# Patient Record
Sex: FEMALE | Race: WHITE | NOT HISPANIC OR LATINO | Employment: OTHER | URBAN - METROPOLITAN AREA
[De-identification: names, ages, dates, MRNs, and addresses within clinical notes are randomized per-mention and may not be internally consistent; named-entity substitution may affect disease eponyms.]

---

## 2023-03-06 ENCOUNTER — APPOINTMENT (OUTPATIENT)
Dept: LAB | Facility: LAB | Age: 68
End: 2023-03-06
Payer: MEDICARE

## 2023-03-06 LAB
ALANINE AMINOTRANSFERASE (SGPT) (U/L) IN SER/PLAS: 17 U/L (ref 7–45)
ALBUMIN (G/DL) IN SER/PLAS: 4.5 G/DL (ref 3.4–5)
ALKALINE PHOSPHATASE (U/L) IN SER/PLAS: 94 U/L (ref 33–136)
ANION GAP IN SER/PLAS: 12 MMOL/L (ref 10–20)
ASPARTATE AMINOTRANSFERASE (SGOT) (U/L) IN SER/PLAS: 17 U/L (ref 9–39)
BILIRUBIN TOTAL (MG/DL) IN SER/PLAS: 0.5 MG/DL (ref 0–1.2)
CALCIUM (MG/DL) IN SER/PLAS: 9.4 MG/DL (ref 8.6–10.3)
CARBON DIOXIDE, TOTAL (MMOL/L) IN SER/PLAS: 30 MMOL/L (ref 21–32)
CHLORIDE (MMOL/L) IN SER/PLAS: 105 MMOL/L (ref 98–107)
CHOLESTEROL (MG/DL) IN SER/PLAS: 191 MG/DL (ref 0–199)
CHOLESTEROL IN HDL (MG/DL) IN SER/PLAS: 88.4 MG/DL
CHOLESTEROL/HDL RATIO: 2.2
COBALAMIN (VITAMIN B12) (PG/ML) IN SER/PLAS: 261 PG/ML (ref 211–911)
CREATININE (MG/DL) IN SER/PLAS: 2.92 MG/DL (ref 0.5–1.05)
ERYTHROCYTE DISTRIBUTION WIDTH (RATIO) BY AUTOMATED COUNT: 14.3 % (ref 11.5–14.5)
ERYTHROCYTE MEAN CORPUSCULAR HEMOGLOBIN CONCENTRATION (G/DL) BY AUTOMATED: 31.6 G/DL (ref 32–36)
ERYTHROCYTE MEAN CORPUSCULAR VOLUME (FL) BY AUTOMATED COUNT: 89 FL (ref 80–100)
ERYTHROCYTES (10*6/UL) IN BLOOD BY AUTOMATED COUNT: 5.3 X10E12/L (ref 4–5.2)
GFR FEMALE: 17 ML/MIN/1.73M2
GLUCOSE (MG/DL) IN SER/PLAS: 89 MG/DL (ref 74–99)
HEMATOCRIT (%) IN BLOOD BY AUTOMATED COUNT: 47.1 % (ref 36–46)
HEMOGLOBIN (G/DL) IN BLOOD: 14.9 G/DL (ref 12–16)
LDL: 84 MG/DL (ref 0–99)
LEUKOCYTES (10*3/UL) IN BLOOD BY AUTOMATED COUNT: 6.4 X10E9/L (ref 4.4–11.3)
PLATELETS (10*3/UL) IN BLOOD AUTOMATED COUNT: 224 X10E9/L (ref 150–450)
POTASSIUM (MMOL/L) IN SER/PLAS: 5.1 MMOL/L (ref 3.5–5.3)
PROTEIN TOTAL: 6.3 G/DL (ref 6.4–8.2)
SODIUM (MMOL/L) IN SER/PLAS: 142 MMOL/L (ref 136–145)
THYROTROPIN (MIU/L) IN SER/PLAS BY DETECTION LIMIT <= 0.05 MIU/L: 2.3 MIU/L (ref 0.44–3.98)
TRIGLYCERIDE (MG/DL) IN SER/PLAS: 95 MG/DL (ref 0–149)
UREA NITROGEN (MG/DL) IN SER/PLAS: 24 MG/DL (ref 6–23)
VLDL: 19 MG/DL (ref 0–40)

## 2023-03-07 ENCOUNTER — TELEPHONE (OUTPATIENT)
Dept: PRIMARY CARE | Facility: CLINIC | Age: 68
End: 2023-03-07
Payer: MEDICARE

## 2023-03-07 DIAGNOSIS — R79.9 ELEVATED BUN: ICD-10-CM

## 2023-03-07 DIAGNOSIS — R82.90 NONSPECIFIC FINDING ON EXAMINATION OF URINE: ICD-10-CM

## 2023-03-13 ENCOUNTER — LAB (OUTPATIENT)
Dept: LAB | Facility: LAB | Age: 68
End: 2023-03-13
Payer: MEDICARE

## 2023-03-13 DIAGNOSIS — R82.90 NONSPECIFIC FINDING ON EXAMINATION OF URINE: ICD-10-CM

## 2023-03-13 DIAGNOSIS — R79.9 ELEVATED BUN: ICD-10-CM

## 2023-03-13 LAB
ANION GAP IN SER/PLAS: 12 MMOL/L (ref 10–20)
CALCIUM (MG/DL) IN SER/PLAS: 9.5 MG/DL (ref 8.6–10.3)
CARBON DIOXIDE, TOTAL (MMOL/L) IN SER/PLAS: 29 MMOL/L (ref 21–32)
CHLORIDE (MMOL/L) IN SER/PLAS: 105 MMOL/L (ref 98–107)
CREATININE (MG/DL) IN SER/PLAS: 1.31 MG/DL (ref 0.5–1.05)
GFR FEMALE: 44 ML/MIN/1.73M2
GLUCOSE (MG/DL) IN SER/PLAS: 78 MG/DL (ref 74–99)
POTASSIUM (MMOL/L) IN SER/PLAS: 4.7 MMOL/L (ref 3.5–5.3)
SODIUM (MMOL/L) IN SER/PLAS: 141 MMOL/L (ref 136–145)
UREA NITROGEN (MG/DL) IN SER/PLAS: 25 MG/DL (ref 6–23)

## 2023-03-13 PROCEDURE — 80048 BASIC METABOLIC PNL TOTAL CA: CPT

## 2023-03-13 PROCEDURE — 36415 COLL VENOUS BLD VENIPUNCTURE: CPT

## 2023-03-16 ENCOUNTER — APPOINTMENT (OUTPATIENT)
Dept: LAB | Facility: LAB | Age: 68
End: 2023-03-16
Payer: MEDICARE

## 2023-03-16 ENCOUNTER — TELEPHONE (OUTPATIENT)
Dept: PRIMARY CARE | Facility: CLINIC | Age: 68
End: 2023-03-16

## 2023-03-16 DIAGNOSIS — N28.1 RENAL CYST, RIGHT: Primary | ICD-10-CM

## 2023-03-16 DIAGNOSIS — R31.9 HEMATURIA, UNSPECIFIED TYPE: ICD-10-CM

## 2023-03-16 LAB
APPEARANCE, URINE: ABNORMAL
BACTERIA, URINE: ABNORMAL /HPF
BILIRUBIN, URINE: NEGATIVE
BLOOD, URINE: NEGATIVE
COLOR, URINE: YELLOW
GLUCOSE, URINE: NEGATIVE MG/DL
KETONES, URINE: NEGATIVE MG/DL
LEUKOCYTE ESTERASE, URINE: ABNORMAL
MUCUS, URINE: ABNORMAL /LPF
NITRITE, URINE: NEGATIVE
PH, URINE: 5 (ref 5–8)
PROTEIN, URINE: NEGATIVE MG/DL
RBC, URINE: 78 /HPF (ref 0–5)
SPECIFIC GRAVITY, URINE: 1.02 (ref 1–1.03)
SQUAMOUS EPITHELIAL CELLS, URINE: 2 /HPF
UROBILINOGEN, URINE: <2 MG/DL (ref 0–1.9)
WBC CLUMPS, URINE: ABNORMAL /HPF
WBC, URINE: 30 /HPF (ref 0–5)

## 2023-03-16 NOTE — TELEPHONE ENCOUNTER
I called pt Re renal US  Showed R septated cyst rec MRI renal mass protocol  Awaiting CB from rads regarding order for MRI as there does not appear to be this type of MRI in the system    SPOKE W RADS, ORDERED MR ABD RENAL MASS PROTOCOL

## 2023-03-17 LAB
TISSUE TRANSGLUTAMINASE, IGA: <1 U/ML (ref 0–14)
URINE CULTURE: NORMAL

## 2023-03-20 NOTE — RESULT ENCOUNTER NOTE
Urine test showed a significant amount of red blood cells. Culture negative for infection. Given her kidney cyst and amount of red blood cells this warrants a urology consult  Pls RF to urology Dx microscopic hematuria

## 2023-03-20 NOTE — RESULT ENCOUNTER NOTE
Urine test showed a lot of red blood cells. This is conjunction w her kidney cyst warrants a urology consult. Urine culture neg for inf.   Pls RF to uro Dx microscopic hematuria.

## 2023-03-23 ENCOUNTER — TELEPHONE (OUTPATIENT)
Dept: PRIMARY CARE | Facility: CLINIC | Age: 68
End: 2023-03-23
Payer: MEDICARE

## 2023-03-23 NOTE — TELEPHONE ENCOUNTER
Left detailed VM  Cyst does not appear worrisome  Still needs uro f/up for microscopic hematuria.   Ok to wait to see delmi or she can fiona w a diff uro if would like to be see sooner

## 2023-04-17 ENCOUNTER — TELEPHONE (OUTPATIENT)
Dept: PRIMARY CARE | Facility: CLINIC | Age: 68
End: 2023-04-17
Payer: MEDICARE

## 2023-04-17 NOTE — TELEPHONE ENCOUNTER
Patient called sts she had been having pain for the past 3 weeks and while lifting something Saturday felt something  in her back she is in so much pain would like to see if she can be seen sts she thinks its a compression fx? Its bad sts shooting pain? Please advise

## 2023-04-17 NOTE — TELEPHONE ENCOUNTER
Patient called back and sts she did have xrays done at the  they said are asking for a follow up appointment and possible MRI is needed she would like appointment ASAP?  please advise

## 2023-04-18 NOTE — TELEPHONE ENCOUNTER
Spoke with patient she is coming in tomorrow she is very upset she needs to have an MRI ordered she needs to have it done she is in tears telling me that waiting for the MRI is unacceptable she is going on vacation and sitting in a car for hours? Should she cx her plans?  She is very frantic.

## 2023-04-19 ENCOUNTER — OFFICE VISIT (OUTPATIENT)
Dept: PRIMARY CARE | Facility: CLINIC | Age: 68
End: 2023-04-19
Payer: MEDICARE

## 2023-04-19 DIAGNOSIS — M54.9 SEVERE BACK PAIN: Primary | ICD-10-CM

## 2023-04-19 PROCEDURE — 3008F BODY MASS INDEX DOCD: CPT | Performed by: FAMILY MEDICINE

## 2023-04-19 PROCEDURE — 99215 OFFICE O/P EST HI 40 MIN: CPT | Performed by: FAMILY MEDICINE

## 2023-04-19 PROCEDURE — 1157F ADVNC CARE PLAN IN RCRD: CPT | Performed by: FAMILY MEDICINE

## 2023-04-19 PROCEDURE — 1159F MED LIST DOCD IN RCRD: CPT | Performed by: FAMILY MEDICINE

## 2023-04-19 RX ORDER — OMEPRAZOLE 40 MG/1
40 CAPSULE, DELAYED RELEASE ORAL
COMMUNITY

## 2023-04-19 RX ORDER — AMLODIPINE BESYLATE 5 MG/1
5 TABLET ORAL DAILY
COMMUNITY
End: 2023-10-09 | Stop reason: DRUGHIGH

## 2023-04-19 RX ORDER — CYCLOBENZAPRINE HCL 5 MG
5 TABLET ORAL
COMMUNITY
Start: 2023-04-17 | End: 2023-10-09 | Stop reason: ALTCHOICE

## 2023-04-19 RX ORDER — LEVOTHYROXINE SODIUM 75 UG/1
75 TABLET ORAL DAILY
COMMUNITY
End: 2024-03-07

## 2023-04-19 RX ORDER — AMOXICILLIN AND CLAVULANATE POTASSIUM 875; 125 MG/1; MG/1
1 TABLET, FILM COATED ORAL 2 TIMES DAILY
COMMUNITY
Start: 2023-04-16 | End: 2023-10-09 | Stop reason: ALTCHOICE

## 2023-04-19 RX ORDER — SUCRALFATE 1 G/1
1 TABLET ORAL
COMMUNITY
Start: 2022-07-11 | End: 2023-09-06 | Stop reason: ALTCHOICE

## 2023-04-19 RX ORDER — BLOOD-GLUCOSE METER
EACH MISCELLANEOUS 4 TIMES DAILY
COMMUNITY
Start: 2021-04-12 | End: 2023-10-09 | Stop reason: ALTCHOICE

## 2023-04-19 RX ORDER — PREDNISONE 20 MG/1
TABLET ORAL
COMMUNITY
Start: 2023-04-17 | End: 2023-08-11 | Stop reason: ALTCHOICE

## 2023-04-19 RX ORDER — ATORVASTATIN CALCIUM 10 MG/1
10 TABLET, FILM COATED ORAL DAILY
COMMUNITY
End: 2024-03-07

## 2023-04-19 RX ORDER — ONDANSETRON 4 MG/1
TABLET, FILM COATED ORAL
COMMUNITY
Start: 2020-05-08

## 2023-04-19 ASSESSMENT — ENCOUNTER SYMPTOMS
DEPRESSION: 0
OCCASIONAL FEELINGS OF UNSTEADINESS: 1
LOSS OF SENSATION IN FEET: 0

## 2023-04-19 NOTE — PROGRESS NOTES
"Subjective   Marissa Mosquera is a 68 y.o. female who presents for Back Pain (Pt here following up from urgent care on back pain ).  HPI  In severe pain after planting her foot and twisting 5 d ago. Has been in SEVERE PAIN since. Went to  2 d ago. Xrays L spine and hip w out acute changes. Unable to sit up straight, can barely walk. Wearing a back brace. +tingling down L leg.   No loss of bowel or bladder control.   Here w her , Broderick.   [place] a trip out [place] in 2 weeks and if she cant go \"SHE WILL DIE\". Has not been on a ni in over 20 yrs   Taking pred and flexeril w out improvement   Current Outpatient Medications on File Prior to Visit   Medication Sig Dispense Refill    amoxicillin-pot clavulanate (Augmentin) 875-125 mg tablet Take 1 tablet (875 mg) by mouth in the morning and 1 tablet (875 mg) before bedtime.      blood sugar diagnostic (Easy Touch Test Strip) strip 4 times a day.      cyclobenzaprine (Flexeril) 5 mg tablet Take 1 tablet (5 mg) by mouth.      ondansetron (Zofran) 4 mg tablet Take by mouth.      predniSONE (Deltasone) 20 mg tablet Take 2 tablets orally once a day (morning), for 5 days. with food.      sucralfate (Carafate) 1 gram tablet Take 1 tablet (1 g) by mouth.      amLODIPine (Norvasc) 5 mg tablet Take 1 tablet (5 mg) by mouth once daily.      atorvastatin (Lipitor) 10 mg tablet Take 1 tablet (10 mg) by mouth once daily.      levothyroxine (Synthroid, Levoxyl) 75 mcg tablet Take 1 tablet (75 mcg) by mouth once daily.      omeprazole (PriLOSEC) 40 mg DR capsule Take 1 capsule (40 mg) by mouth once daily in the morning. Take before meals.       No current facility-administered medications on file prior to visit.                  Objective   There were no vitals taken for this visit.   Physical Exam  PATIENT REFUSED VITALS   General: MODERATE DISTRESS   HEENT:NCAT, PERRLA  MSK: no c/c/e.  SEVERE L LOWER L SPINE PARASPINAL MUSCLE TENDERNESS INTO GLUTEAL REGION   WEARING LARGE BACK " BRACE   BENDING FWD   UNABLE TO SIT   Skin: warm and dry  Psych: cooperative, appropriate affect  Neuro: speech clear. A&Ox3  Assessment/Plan   Problem List Items Addressed This Visit    None  Visit Diagnoses       Severe back pain    -  Primary  -In significant pain and distress  -reviewed xrays from 2 d ago. No acute changes. L1 Comp Fx from years ago   -concern for disc herniation   -needs stat imaging which I'm unable to order stat MRI   -Advised ER. Pt and  agree to go to [facility]. Spoke w ER provider, aware of her arrival   -no improvement w pred/flexeril

## 2023-05-02 ENCOUNTER — HOSPITAL ENCOUNTER (OUTPATIENT)
Dept: DATA CONVERSION | Facility: HOSPITAL | Age: 68
End: 2023-05-02
Attending: ANESTHESIOLOGY | Admitting: ANESTHESIOLOGY
Payer: MEDICARE

## 2023-05-02 DIAGNOSIS — Z96.612 PRESENCE OF LEFT ARTIFICIAL SHOULDER JOINT: ICD-10-CM

## 2023-05-02 DIAGNOSIS — M54.17 RADICULOPATHY, LUMBOSACRAL REGION: ICD-10-CM

## 2023-05-02 DIAGNOSIS — Z96.611 PRESENCE OF RIGHT ARTIFICIAL SHOULDER JOINT: ICD-10-CM

## 2023-05-02 DIAGNOSIS — I10 ESSENTIAL (PRIMARY) HYPERTENSION: ICD-10-CM

## 2023-05-02 DIAGNOSIS — N28.1 CYST OF KIDNEY, ACQUIRED: ICD-10-CM

## 2023-05-02 DIAGNOSIS — Z96.651 PRESENCE OF RIGHT ARTIFICIAL KNEE JOINT: ICD-10-CM

## 2023-06-26 LAB
ALBUMIN (G/DL) IN SER/PLAS: 4.6 G/DL (ref 3.4–5)
ALBUMIN (MG/L) IN URINE: <7 MG/L
ALBUMIN/CREATININE (UG/MG) IN URINE: NORMAL UG/MG CRT (ref 0–30)
ANION GAP IN SER/PLAS: 20 MMOL/L (ref 10–20)
APPEARANCE, URINE: ABNORMAL
BILIRUBIN, URINE: NEGATIVE
BLOOD, URINE: NEGATIVE
CALCIUM (MG/DL) IN SER/PLAS: 10.8 MG/DL (ref 8.6–10.6)
CALCIUM OXALATE CRYSTALS, URINE: ABNORMAL /HPF
CARBON DIOXIDE, TOTAL (MMOL/L) IN SER/PLAS: 24 MMOL/L (ref 21–32)
CHLORIDE (MMOL/L) IN SER/PLAS: 105 MMOL/L (ref 98–107)
COLOR, URINE: ABNORMAL
CREATININE (MG/DL) IN SER/PLAS: 1.3 MG/DL (ref 0.5–1.05)
CREATININE (MG/DL) IN URINE: 49.3 MG/DL (ref 20–320)
CREATININE (MG/DL) IN URINE: 49.3 MG/DL (ref 20–320)
ERYTHROCYTE DISTRIBUTION WIDTH (RATIO) BY AUTOMATED COUNT: 13.8 % (ref 11.5–14.5)
ERYTHROCYTE MEAN CORPUSCULAR HEMOGLOBIN CONCENTRATION (G/DL) BY AUTOMATED: 33.1 G/DL (ref 32–36)
ERYTHROCYTE MEAN CORPUSCULAR VOLUME (FL) BY AUTOMATED COUNT: 86 FL (ref 80–100)
ERYTHROCYTES (10*6/UL) IN BLOOD BY AUTOMATED COUNT: 5.22 X10E12/L (ref 4–5.2)
GFR FEMALE: 44 ML/MIN/1.73M2
GLUCOSE (MG/DL) IN SER/PLAS: 102 MG/DL (ref 74–99)
GLUCOSE, URINE: NEGATIVE MG/DL
HEMATOCRIT (%) IN BLOOD BY AUTOMATED COUNT: 44.7 % (ref 36–46)
HEMOGLOBIN (G/DL) IN BLOOD: 14.8 G/DL (ref 12–16)
KETONES, URINE: NEGATIVE MG/DL
LEUKOCYTE ESTERASE, URINE: ABNORMAL
LEUKOCYTES (10*3/UL) IN BLOOD BY AUTOMATED COUNT: 6.7 X10E9/L (ref 4.4–11.3)
NITRITE, URINE: NEGATIVE
NRBC (PER 100 WBCS) BY AUTOMATED COUNT: 0 /100 WBC (ref 0–0)
PH, URINE: 5 (ref 5–8)
PHOSPHATE (MG/DL) IN SER/PLAS: 4.7 MG/DL (ref 2.5–4.9)
PLATELETS (10*3/UL) IN BLOOD AUTOMATED COUNT: 301 X10E9/L (ref 150–450)
POTASSIUM (MMOL/L) IN SER/PLAS: 5 MMOL/L (ref 3.5–5.3)
PROTEIN (MG/DL) IN URINE: 5 MG/DL (ref 5–24)
PROTEIN, URINE: NEGATIVE MG/DL
PROTEIN/CREATININE (MG/MG) IN URINE: 0.1 MG/MG CREAT (ref 0–0.17)
RBC, URINE: 1 /HPF (ref 0–5)
SODIUM (MMOL/L) IN SER/PLAS: 144 MMOL/L (ref 136–145)
SPECIFIC GRAVITY, URINE: 1.01 (ref 1–1.03)
UREA NITROGEN (MG/DL) IN SER/PLAS: 23 MG/DL (ref 6–23)
UROBILINOGEN, URINE: <2 MG/DL (ref 0–1.9)
WBC, URINE: <1 /HPF (ref 0–5)

## 2023-06-27 LAB
IMMUNOGLOBULIN LIGHT CHAINS KAPPA/LAMBDA (MASS RATIO) IN SERUM: 1.14 (ref 0.26–1.65)
IMMUNOGLOBULIN LIGHT CHAINS.KAPPA (MG/DL) IN SERUM: 1.78 MG/DL (ref 0.33–1.94)
IMMUNOGLOBULIN LIGHT CHAINS.LAMBDA (MG/DL) IN SERUM: 1.56 MG/DL (ref 0.57–2.63)

## 2023-08-10 ENCOUNTER — TELEPHONE (OUTPATIENT)
Dept: PRIMARY CARE | Facility: CLINIC | Age: 68
End: 2023-08-10
Payer: MEDICARE

## 2023-08-10 NOTE — TELEPHONE ENCOUNTER
Next is 8/16, or we have three EPV fast appts from 2-3 today.   Could dbl bk then, but that would be 4th one today--BB to approve?

## 2023-08-10 NOTE — TELEPHONE ENCOUNTER
Patient called in with pain near left side under ribs, very painful to touch, has upcoming appt 09/06/2023, Advise?

## 2023-08-11 ENCOUNTER — OFFICE VISIT (OUTPATIENT)
Dept: PRIMARY CARE | Facility: CLINIC | Age: 68
End: 2023-08-11
Payer: MEDICARE

## 2023-08-11 VITALS
DIASTOLIC BLOOD PRESSURE: 84 MMHG | OXYGEN SATURATION: 98 % | HEART RATE: 67 BPM | SYSTOLIC BLOOD PRESSURE: 137 MMHG | RESPIRATION RATE: 18 BRPM | TEMPERATURE: 98.4 F | WEIGHT: 193.4 LBS | BODY MASS INDEX: 36.51 KG/M2 | HEIGHT: 61 IN

## 2023-08-11 DIAGNOSIS — R07.81 RIB PAIN ON LEFT SIDE: Primary | ICD-10-CM

## 2023-08-11 DIAGNOSIS — K29.00 ACUTE SUPERFICIAL GASTRITIS WITHOUT HEMORRHAGE: ICD-10-CM

## 2023-08-11 PROCEDURE — 1125F AMNT PAIN NOTED PAIN PRSNT: CPT | Performed by: FAMILY MEDICINE

## 2023-08-11 PROCEDURE — 3008F BODY MASS INDEX DOCD: CPT | Performed by: FAMILY MEDICINE

## 2023-08-11 PROCEDURE — 1159F MED LIST DOCD IN RCRD: CPT | Performed by: FAMILY MEDICINE

## 2023-08-11 PROCEDURE — 99214 OFFICE O/P EST MOD 30 MIN: CPT | Performed by: FAMILY MEDICINE

## 2023-08-11 PROCEDURE — 1157F ADVNC CARE PLAN IN RCRD: CPT | Performed by: FAMILY MEDICINE

## 2023-08-11 PROCEDURE — 1036F TOBACCO NON-USER: CPT | Performed by: FAMILY MEDICINE

## 2023-08-11 RX ORDER — PNV NO.95/FERROUS FUM/FOLIC AC 28MG-0.8MG
1 TABLET ORAL
COMMUNITY
End: 2024-02-07 | Stop reason: ALTCHOICE

## 2023-08-11 RX ORDER — CALCIUM CARBONATE 200(500)MG
TABLET,CHEWABLE ORAL
COMMUNITY
Start: 2023-06-05

## 2023-08-11 RX ORDER — DICYCLOMINE HYDROCHLORIDE 10 MG/1
10 CAPSULE ORAL AS NEEDED
COMMUNITY
Start: 2023-06-05 | End: 2023-10-09 | Stop reason: ALTCHOICE

## 2023-08-11 RX ORDER — SUCRALFATE 1 G/1
1 TABLET ORAL
Qty: 28 TABLET | Refills: 0 | Status: SHIPPED | OUTPATIENT
Start: 2023-08-11 | End: 2023-08-18

## 2023-08-11 NOTE — PROGRESS NOTES
"Subjective   Marissa Mosquera is a 68 y.o. female who presents for Chest Pain (painful ribs).  HPI  L lower rib pain worse w touch, pain to sleep. Bras hurt. No rash, no fever, no injury. Started 3 mo ago. Started dull ache and now start. Worsening fatigue.     Ongoing gastropareis. Not on meds d/t CKD     Lumbar radicular pain somewhat better.   Current Outpatient Medications on File Prior to Visit   Medication Sig Dispense Refill    calcium carbonate (Tums) 200 mg calcium chewable tablet Chew.      dicyclomine (Bentyl) 10 mg capsule Take 1 capsule (10 mg) by mouth if needed.      amLODIPine (Norvasc) 5 mg tablet Take 1 tablet (5 mg) by mouth once daily.      amoxicillin-pot clavulanate (Augmentin) 875-125 mg tablet Take 1 tablet (875 mg) by mouth in the morning and 1 tablet (875 mg) before bedtime.      atorvastatin (Lipitor) 10 mg tablet Take 1 tablet (10 mg) by mouth once daily.      blood sugar diagnostic (Easy Touch Test Strip) strip 4 times a day.      calcium carbonate-vitamin D3 (Oscal-500) 500 mg-10 mcg (400 unit) tablet Take 1 tablet by mouth once daily.      cyclobenzaprine (Flexeril) 5 mg tablet Take 1 tablet (5 mg) by mouth.      levothyroxine (Synthroid, Levoxyl) 75 mcg tablet Take 1 tablet (75 mcg) by mouth once daily.      omeprazole (PriLOSEC) 40 mg DR capsule Take 1 capsule (40 mg) by mouth once daily in the morning. Take before meals.      ondansetron (Zofran) 4 mg tablet Take by mouth.      sucralfate (Carafate) 1 gram tablet Take 1 tablet (1 g) by mouth.      [DISCONTINUED] predniSONE (Deltasone) 20 mg tablet Take 2 tablets orally once a day (morning), for 5 days. with food.       No current facility-administered medications on file prior to visit.                  Objective   /84   Pulse 67   Temp 36.9 °C (98.4 °F)   Resp 18   Ht 1.549 m (5' 0.98\")   Wt 87.7 kg (193 lb 6.4 oz)   SpO2 98%   BMI 36.56 kg/m²    Physical Exam  General: NAD  HEENT:NCAT, PERRLA, nml OP  Neck: no " cervical KALPANA  Heart: RRR no murmur, no edema   Lungs: CTA b/l, no wheeze or rhonchi   GI: abd soft, LUQ pain, nondistended.   MSK: no c/c/e. nml gait L LOWER LAT RIB PAIN   Skin: warm and dry  Psych: cooperative, appropriate affect  Neuro: speech clear. A&Ox3  Assessment/Plan   Problem List Items Addressed This Visit    None  Visit Diagnoses       Rib pain on left side    -  Primary  -Check rib series but could be GI RF pain     Relevant Orders    XR ribs left 2 views    Acute superficial gastritis without hemorrhage      -known Hx of ulcers not on PPI d/t CKD   -carfate 1 g QID x 7 d   -gastroparesis also playing a role, not on Tx d/t CKD     Relevant Medications    sucralfate (Carafate) 1 gram tablet

## 2023-08-31 PROBLEM — K58.0 IRRITABLE BOWEL SYNDROME WITH DIARRHEA: Status: ACTIVE | Noted: 2023-08-31

## 2023-08-31 PROBLEM — R73.03 PRE-DIABETES: Status: ACTIVE | Noted: 2023-08-31

## 2023-08-31 PROBLEM — E66.812 CLASS 2 DRUG-INDUCED OBESITY WITH BODY MASS INDEX (BMI) OF 35.0 TO 35.9 IN ADULT: Status: ACTIVE | Noted: 2023-08-31

## 2023-08-31 PROBLEM — R06.02 SHORTNESS OF BREATH AT REST: Status: ACTIVE | Noted: 2023-08-31

## 2023-08-31 PROBLEM — H53.9 VISION CHANGES: Status: ACTIVE | Noted: 2023-08-31

## 2023-08-31 PROBLEM — Z96.612 HISTORY OF REPLACEMENT OF BOTH SHOULDER JOINTS: Status: ACTIVE | Noted: 2023-08-31

## 2023-08-31 PROBLEM — N18.30 STAGE 3 CHRONIC KIDNEY DISEASE (MULTI): Status: ACTIVE | Noted: 2023-08-31

## 2023-08-31 PROBLEM — E66.3 OVERWEIGHT: Status: ACTIVE | Noted: 2023-08-31

## 2023-08-31 PROBLEM — T50.4X5A: Status: ACTIVE | Noted: 2023-08-31

## 2023-08-31 PROBLEM — E78.5 DYSLIPIDEMIA: Status: ACTIVE | Noted: 2023-08-31

## 2023-08-31 PROBLEM — R53.83 FATIGUE: Status: ACTIVE | Noted: 2023-08-31

## 2023-08-31 PROBLEM — M54.16 LUMBAR RADICULITIS: Status: ACTIVE | Noted: 2023-08-31

## 2023-08-31 PROBLEM — S32.010A COMPRESSION FRACTURE OF L1 LUMBAR VERTEBRA (MULTI): Status: ACTIVE | Noted: 2023-08-31

## 2023-08-31 PROBLEM — Z98.84 HISTORY OF BARIATRIC SURGERY: Status: ACTIVE | Noted: 2023-08-31

## 2023-08-31 PROBLEM — N28.9 MILD RENAL INSUFFICIENCY: Status: ACTIVE | Noted: 2023-08-31

## 2023-08-31 PROBLEM — R60.0 PERIORBITAL EDEMA OF RIGHT EYE: Status: ACTIVE | Noted: 2023-08-31

## 2023-08-31 PROBLEM — R90.89 ABNORMAL BRAIN MRI: Status: ACTIVE | Noted: 2023-08-31

## 2023-08-31 PROBLEM — M51.27 LUMBOSACRAL DISC HERNIATION: Status: ACTIVE | Noted: 2023-08-31

## 2023-08-31 PROBLEM — F32.A DEPRESSIVE DISORDER: Status: ACTIVE | Noted: 2023-08-31

## 2023-08-31 PROBLEM — G25.0 ESSENTIAL TREMOR: Status: ACTIVE | Noted: 2023-08-31

## 2023-08-31 PROBLEM — M71.349 OTHER BURSAL CYST, UNSPECIFIED HAND: Status: ACTIVE | Noted: 2017-05-10

## 2023-08-31 PROBLEM — E66.01 MORBID OBESITY (MULTI): Status: ACTIVE | Noted: 2023-08-31

## 2023-08-31 PROBLEM — R63.4 WEIGHT LOSS: Status: ACTIVE | Noted: 2023-08-31

## 2023-08-31 PROBLEM — H52.10 MYOPIA WITH PRESBYOPIA: Status: ACTIVE | Noted: 2023-08-31

## 2023-08-31 PROBLEM — H35.369 MACULAR DRUSEN: Status: ACTIVE | Noted: 2023-08-31

## 2023-08-31 PROBLEM — H35.62 RETINAL HEMORRHAGE, LEFT EYE: Status: ACTIVE | Noted: 2023-08-31

## 2023-08-31 PROBLEM — M19.079 ARTHRITIS OF FIRST MTP JOINT: Status: ACTIVE | Noted: 2023-08-31

## 2023-08-31 PROBLEM — H25.813 COMBINED FORM OF AGE-RELATED CATARACT, BOTH EYES: Status: ACTIVE | Noted: 2023-08-31

## 2023-08-31 PROBLEM — M54.12 CERVICAL MYELOPATHY WITH CERVICAL RADICULOPATHY (MULTI): Status: ACTIVE | Noted: 2023-08-31

## 2023-08-31 PROBLEM — M76.61 ACHILLES TENDINITIS OF RIGHT LOWER EXTREMITY: Status: ACTIVE | Noted: 2023-08-31

## 2023-08-31 PROBLEM — G89.29 CHRONIC LEFT SHOULDER PAIN: Status: ACTIVE | Noted: 2023-08-31

## 2023-08-31 PROBLEM — M48.02 CERVICAL SPINAL STENOSIS: Status: ACTIVE | Noted: 2023-08-31

## 2023-08-31 PROBLEM — Z96.611 HISTORY OF REPLACEMENT OF BOTH SHOULDER JOINTS: Status: ACTIVE | Noted: 2023-08-31

## 2023-08-31 PROBLEM — R82.90 ABNORMAL FINDING IN URINE: Status: ACTIVE | Noted: 2023-08-31

## 2023-08-31 PROBLEM — E16.2 HYPOGLYCEMIA: Status: ACTIVE | Noted: 2023-08-31

## 2023-08-31 PROBLEM — H81.10 BENIGN POSITIONAL VERTIGO: Status: ACTIVE | Noted: 2023-08-31

## 2023-08-31 PROBLEM — E66.1 CLASS 2 DRUG-INDUCED OBESITY WITH BODY MASS INDEX (BMI) OF 35.0 TO 35.9 IN ADULT: Status: ACTIVE | Noted: 2023-08-31

## 2023-08-31 PROBLEM — T30.0 BURN, FIRST DEGREE: Status: ACTIVE | Noted: 2023-08-31

## 2023-08-31 PROBLEM — M19.90 ARTHRITIS: Status: ACTIVE | Noted: 2023-08-31

## 2023-08-31 PROBLEM — H53.9 VISUAL DISTURBANCE: Status: ACTIVE | Noted: 2023-08-31

## 2023-08-31 PROBLEM — R00.2 PALPITATIONS: Status: ACTIVE | Noted: 2023-08-31

## 2023-08-31 PROBLEM — Z86.69 H/O DIPLOPIA: Status: ACTIVE | Noted: 2023-08-31

## 2023-08-31 PROBLEM — E55.9 VITAMIN D INSUFFICIENCY: Status: ACTIVE | Noted: 2023-08-31

## 2023-08-31 PROBLEM — K29.70 GASTRITIS: Status: ACTIVE | Noted: 2023-08-31

## 2023-08-31 PROBLEM — M25.512 CHRONIC PAIN OF BOTH SHOULDERS: Status: ACTIVE | Noted: 2023-08-31

## 2023-08-31 PROBLEM — G93.0 INTRACRANIAL ARACHNOID CYST: Status: ACTIVE | Noted: 2023-08-31

## 2023-08-31 PROBLEM — N18.4 CKD (CHRONIC KIDNEY DISEASE) STAGE 4, GFR 15-29 ML/MIN (MULTI): Status: ACTIVE | Noted: 2023-08-31

## 2023-08-31 PROBLEM — G89.29 CHRONIC PAIN: Status: ACTIVE | Noted: 2023-08-31

## 2023-08-31 PROBLEM — M10.9 GOUT: Status: ACTIVE | Noted: 2023-08-31

## 2023-08-31 PROBLEM — G62.9 PERIPHERAL NEUROPATHY: Status: ACTIVE | Noted: 2023-08-31

## 2023-08-31 PROBLEM — E78.5 HLD (HYPERLIPIDEMIA): Status: ACTIVE | Noted: 2023-08-31

## 2023-08-31 PROBLEM — M85.80 OSTEOPENIA: Status: ACTIVE | Noted: 2023-08-31

## 2023-08-31 PROBLEM — D31.32 CHOROIDAL NEVUS OF LEFT EYE: Status: ACTIVE | Noted: 2023-08-31

## 2023-08-31 PROBLEM — G90.01 CAROTIDYNIA: Status: ACTIVE | Noted: 2023-08-31

## 2023-08-31 PROBLEM — B02.29 HZV (HERPES ZOSTER VIRUS) POST HERPETIC NEURALGIA: Status: ACTIVE | Noted: 2023-08-31

## 2023-08-31 PROBLEM — H04.123 BILATERAL DRY EYES: Status: ACTIVE | Noted: 2023-08-31

## 2023-08-31 PROBLEM — M25.512 CHRONIC LEFT SHOULDER PAIN: Status: ACTIVE | Noted: 2023-08-31

## 2023-08-31 PROBLEM — E83.52 HYPERCALCEMIA: Status: ACTIVE | Noted: 2023-08-31

## 2023-08-31 PROBLEM — R06.09 DOE (DYSPNEA ON EXERTION): Status: ACTIVE | Noted: 2023-08-31

## 2023-08-31 PROBLEM — M79.10 MYALGIA: Status: ACTIVE | Noted: 2023-08-31

## 2023-08-31 PROBLEM — M25.511 CHRONIC PAIN OF BOTH SHOULDERS: Status: ACTIVE | Noted: 2023-08-31

## 2023-08-31 PROBLEM — G89.29 CHRONIC PAIN OF BOTH SHOULDERS: Status: ACTIVE | Noted: 2023-08-31

## 2023-08-31 PROBLEM — R25.1 TREMOR OF RIGHT HAND: Status: ACTIVE | Noted: 2023-08-31

## 2023-08-31 PROBLEM — D31.41 NEVUS OF IRIS OF RIGHT EYE: Status: ACTIVE | Noted: 2023-08-31

## 2023-08-31 PROBLEM — H52.4 MYOPIA WITH PRESBYOPIA: Status: ACTIVE | Noted: 2023-08-31

## 2023-08-31 PROBLEM — K59.9 FUNCTIONAL BOWEL DISEASE: Status: ACTIVE | Noted: 2023-08-31

## 2023-08-31 PROBLEM — K44.9 HERNIA, HIATAL: Status: ACTIVE | Noted: 2023-08-31

## 2023-08-31 PROBLEM — K31.89 NONSURGICAL DUMPING SYNDROME: Status: ACTIVE | Noted: 2023-08-31

## 2023-08-31 PROBLEM — M81.0 OSTEOPOROSIS: Status: ACTIVE | Noted: 2023-08-31

## 2023-08-31 PROBLEM — M19.019 SHOULDER ARTHRITIS: Status: ACTIVE | Noted: 2023-08-31

## 2023-08-31 PROBLEM — M46.1 SACROILIITIS (CMS-HCC): Status: ACTIVE | Noted: 2023-08-31

## 2023-08-31 PROBLEM — I10 BENIGN ESSENTIAL HYPERTENSION: Status: ACTIVE | Noted: 2023-08-31

## 2023-08-31 PROBLEM — T23.201A SECOND DEGREE BURN OF RIGHT HAND: Status: ACTIVE | Noted: 2023-08-31

## 2023-08-31 PROBLEM — R13.19 ESOPHAGEAL DYSPHAGIA: Status: ACTIVE | Noted: 2023-08-31

## 2023-08-31 PROBLEM — K90.9 MALABSORPTION (HHS-HCC): Status: ACTIVE | Noted: 2023-08-31

## 2023-08-31 PROBLEM — F60.3 BORDERLINE PERSONALITY DISORDER (MULTI): Status: ACTIVE | Noted: 2023-08-31

## 2023-08-31 PROBLEM — K90.9 ABNORMAL INTESTINAL ABSORPTION (HHS-HCC): Status: ACTIVE | Noted: 2023-08-31

## 2023-08-31 PROBLEM — R42 ORTHOSTATIC LIGHTHEADEDNESS: Status: ACTIVE | Noted: 2023-08-31

## 2023-08-31 PROBLEM — R55 SYNCOPE: Status: ACTIVE | Noted: 2023-08-31

## 2023-08-31 PROBLEM — E53.8 VITAMIN B12 DEFICIENCY: Status: ACTIVE | Noted: 2023-08-31

## 2023-08-31 PROBLEM — R42 DIZZINESS: Status: ACTIVE | Noted: 2023-08-31

## 2023-08-31 PROBLEM — G95.9 CERVICAL MYELOPATHY WITH CERVICAL RADICULOPATHY (MULTI): Status: ACTIVE | Noted: 2023-08-31

## 2023-08-31 PROBLEM — K31.84 GASTROPARESIS: Status: ACTIVE | Noted: 2023-08-31

## 2023-08-31 PROBLEM — I10 HTN (HYPERTENSION): Status: ACTIVE | Noted: 2023-08-31

## 2023-09-06 ENCOUNTER — OFFICE VISIT (OUTPATIENT)
Dept: PRIMARY CARE | Facility: CLINIC | Age: 68
End: 2023-09-06
Payer: MEDICARE

## 2023-09-06 VITALS
SYSTOLIC BLOOD PRESSURE: 146 MMHG | TEMPERATURE: 97.8 F | HEIGHT: 61 IN | DIASTOLIC BLOOD PRESSURE: 91 MMHG | HEART RATE: 75 BPM | RESPIRATION RATE: 18 BRPM | OXYGEN SATURATION: 98 % | BODY MASS INDEX: 36.63 KG/M2 | WEIGHT: 194 LBS

## 2023-09-06 DIAGNOSIS — E78.2 MIXED HYPERLIPIDEMIA: ICD-10-CM

## 2023-09-06 DIAGNOSIS — K52.9 CHRONIC DIARRHEA: ICD-10-CM

## 2023-09-06 DIAGNOSIS — Z98.84 HISTORY OF GASTRIC BYPASS: ICD-10-CM

## 2023-09-06 DIAGNOSIS — R73.9 HYPERGLYCEMIA: ICD-10-CM

## 2023-09-06 DIAGNOSIS — E03.8 OTHER SPECIFIED HYPOTHYROIDISM: ICD-10-CM

## 2023-09-06 DIAGNOSIS — K31.84 GASTROPARESIS: Primary | ICD-10-CM

## 2023-09-06 DIAGNOSIS — R19.7 DIARRHEA IN ADULT PATIENT: ICD-10-CM

## 2023-09-06 DIAGNOSIS — I10 HYPERTENSION, UNSPECIFIED TYPE: ICD-10-CM

## 2023-09-06 DIAGNOSIS — K31.84 NONDIABETIC GASTROPARESIS: ICD-10-CM

## 2023-09-06 PROCEDURE — 3080F DIAST BP >= 90 MM HG: CPT | Performed by: FAMILY MEDICINE

## 2023-09-06 PROCEDURE — 1159F MED LIST DOCD IN RCRD: CPT | Performed by: FAMILY MEDICINE

## 2023-09-06 PROCEDURE — 1157F ADVNC CARE PLAN IN RCRD: CPT | Performed by: FAMILY MEDICINE

## 2023-09-06 PROCEDURE — 3008F BODY MASS INDEX DOCD: CPT | Performed by: FAMILY MEDICINE

## 2023-09-06 PROCEDURE — 3077F SYST BP >= 140 MM HG: CPT | Performed by: FAMILY MEDICINE

## 2023-09-06 PROCEDURE — 1125F AMNT PAIN NOTED PAIN PRSNT: CPT | Performed by: FAMILY MEDICINE

## 2023-09-06 PROCEDURE — 90677 PCV20 VACCINE IM: CPT | Performed by: FAMILY MEDICINE

## 2023-09-06 PROCEDURE — 1036F TOBACCO NON-USER: CPT | Performed by: FAMILY MEDICINE

## 2023-09-06 PROCEDURE — 99214 OFFICE O/P EST MOD 30 MIN: CPT | Performed by: FAMILY MEDICINE

## 2023-09-06 PROCEDURE — G0009 ADMIN PNEUMOCOCCAL VACCINE: HCPCS | Performed by: FAMILY MEDICINE

## 2023-09-06 RX ORDER — METOCLOPRAMIDE 5 MG/1
5 TABLET ORAL 2 TIMES DAILY
Qty: 60 TABLET | Refills: 11 | Status: SHIPPED | OUTPATIENT
Start: 2023-09-06 | End: 2023-10-09 | Stop reason: ALTCHOICE

## 2023-09-06 NOTE — PROGRESS NOTES
"Subjective   Bunny or Ms Eveline Mosquera is a 68 y.o. female who presents for Follow-up (Six month follow up ) and Immunizations (Pneu vax/).  HPI  Having diarrhea for >3 d and GI upset, having SIBO flare, taking augementin. Would like RF to [place]. Will have 12 hrs of explosive diarrhea that's on and off occurs usually weekly.     Carafate helped w gastritis last mo    Vomiting at night and aspirating.     HA w ears ringing and fatigue then will fall asleep. Unilateral HA. No vomiting. No fever, no vision, ST congestion. Started 2 d ago.   Current Outpatient Medications on File Prior to Visit   Medication Sig Dispense Refill    amLODIPine (Norvasc) 5 mg tablet Take 1 tablet (5 mg) by mouth once daily.      amoxicillin-pot clavulanate (Augmentin) 875-125 mg tablet Take 1 tablet (875 mg) by mouth in the morning and 1 tablet (875 mg) before bedtime.      atorvastatin (Lipitor) 10 mg tablet Take 1 tablet (10 mg) by mouth once daily.      blood sugar diagnostic (Easy Touch Test Strip) strip 4 times a day.      calcium carbonate (Tums) 200 mg calcium chewable tablet Chew.      calcium carbonate-vitamin D3 (Oscal-500) 500 mg-10 mcg (400 unit) tablet Take 1 tablet by mouth once daily.      cyclobenzaprine (Flexeril) 5 mg tablet Take 1 tablet (5 mg) by mouth.      dicyclomine (Bentyl) 10 mg capsule Take 1 capsule (10 mg) by mouth if needed.      levothyroxine (Synthroid, Levoxyl) 75 mcg tablet Take 1 tablet (75 mcg) by mouth once daily.      omeprazole (PriLOSEC) 40 mg DR capsule Take 1 capsule (40 mg) by mouth once daily in the morning. Take before meals.      ondansetron (Zofran) 4 mg tablet Take by mouth.      sucralfate (Carafate) 1 gram tablet Take 1 tablet (1 g) by mouth.       No current facility-administered medications on file prior to visit.                  Objective   BP (!) 146/91   Pulse 75   Temp 36.6 °C (97.8 °F)   Resp 18   Ht 1.549 m (5' 0.98\")   Wt 88 kg (194 lb)   SpO2 98%   BMI 36.68 kg/m²  "   Physical Exam  General: NAD  HEENT:NCAT, PERRLA, nml OP  Neck: no cervical KALPANA  Heart: RRR no murmur, no edema   Lungs: CTA b/l, no wheeze or rhonchi   GI: abd soft, nontender, nondistended.   MSK: no c/c/e. nml gait   Skin: warm and dry  Psych: cooperative, appropriate affect  Neuro: speech clear. A&Ox3  Assessment/Plan   Problem List Items Addressed This Visit       Gastroparesis - Primary  -aspirating at night is more worrisome  -start reglan (renal dosed) 5 mg BID   -f/up 1 mo  -f/up w GI as fiona   -reviewed GES from 3/2023 c/w GP     Relevant Medications    metoclopramide (Reglan) 5 mg tablet     Other Visit Diagnoses       Chronic diarrhea      -diarrhea episodes are severe.   -?microcolitis vs inf vs pancreas issue  -check stool studies  -RF c-scope  -f/up 1 GI as fiona     Relevant Orders    C. difficile, PCR    Lactoferrin, Fecal, Quantitative    Ova/Para + Giardia/Cryptosporidium Antigen    Stool Pathogen Panel, PCR    Pancreatic Elastase, Fecal    Fecal Fat, Qualitative    Calprotectin Stool    Colonoscopy Screening    Diarrhea in adult patient        Relevant Orders    Stool Pathogen Panel, PCR    BMI 36.0-36.9,adult      -wt is stable     HTN: BP elevated, monitor at home. Also seeing cards soon     F/up 1 mo AWV labs prior     PNA vacc today

## 2023-09-11 ENCOUNTER — LAB (OUTPATIENT)
Dept: LAB | Facility: LAB | Age: 68
End: 2023-09-11
Payer: MEDICARE

## 2023-09-11 DIAGNOSIS — R19.7 DIARRHEA IN ADULT PATIENT: ICD-10-CM

## 2023-09-11 DIAGNOSIS — K52.9 CHRONIC DIARRHEA: ICD-10-CM

## 2023-09-11 LAB
C. DIFFICILE TOXIN, PCR: NOT DETECTED
CAMPYLOBACTER GP: NOT DETECTED
NOROVIRUS GI/GII: NOT DETECTED
ROTAVIRUS A: NOT DETECTED
SALMONELLA SP.: NOT DETECTED
SHIGA TOXIN 1: NOT DETECTED
SHIGA TOXIN 2: NOT DETECTED
SHIGELLA SP.: NOT DETECTED
VIBRIO GRP.: NOT DETECTED
YERSINIA ENTEROCOLITICA: NOT DETECTED

## 2023-09-11 PROCEDURE — 83520 IMMUNOASSAY QUANT NOS NONAB: CPT

## 2023-09-11 PROCEDURE — 83630 LACTOFERRIN FECAL (QUAL): CPT

## 2023-09-11 PROCEDURE — 89125 SPECIMEN FAT STAIN: CPT

## 2023-09-11 PROCEDURE — 87493 C DIFF AMPLIFIED PROBE: CPT

## 2023-09-11 PROCEDURE — 83993 ASSAY FOR CALPROTECTIN FECAL: CPT

## 2023-09-11 PROCEDURE — 87506 IADNA-DNA/RNA PROBE TQ 6-11: CPT

## 2023-09-14 LAB
CALPROTECTIN, STOOL: 44 UG/G
FAT, FECAL - NEUTRAL: NORMAL
FAT, FECAL - SPLIT: NORMAL
PANCREATIC ELASTASE, FECAL: 188 UG/G

## 2023-09-15 LAB — LACTOFERRIN, FECAL, QUANT.: NEGATIVE

## 2023-09-19 ENCOUNTER — LAB (OUTPATIENT)
Dept: LAB | Facility: LAB | Age: 68
End: 2023-09-19
Payer: MEDICARE

## 2023-09-19 DIAGNOSIS — E03.8 OTHER SPECIFIED HYPOTHYROIDISM: ICD-10-CM

## 2023-09-19 DIAGNOSIS — K31.84 NONDIABETIC GASTROPARESIS: ICD-10-CM

## 2023-09-19 DIAGNOSIS — R73.9 HYPERGLYCEMIA: ICD-10-CM

## 2023-09-19 DIAGNOSIS — Z98.84 HISTORY OF GASTRIC BYPASS: ICD-10-CM

## 2023-09-19 DIAGNOSIS — E78.2 MIXED HYPERLIPIDEMIA: ICD-10-CM

## 2023-09-19 LAB
ALANINE AMINOTRANSFERASE (SGPT) (U/L) IN SER/PLAS: 17 U/L (ref 7–45)
ALBUMIN (G/DL) IN SER/PLAS: 4.2 G/DL (ref 3.4–5)
ALBUMIN (G/DL) IN SER/PLAS: 4.4 G/DL (ref 3.4–5)
ALKALINE PHOSPHATASE (U/L) IN SER/PLAS: 101 U/L (ref 33–136)
ANION GAP IN SER/PLAS: 15 MMOL/L (ref 10–20)
ANION GAP IN SER/PLAS: 16 MMOL/L (ref 10–20)
ASPARTATE AMINOTRANSFERASE (SGOT) (U/L) IN SER/PLAS: 19 U/L (ref 9–39)
BASOPHILS (10*3/UL) IN BLOOD BY AUTOMATED COUNT: 0.05 X10E9/L (ref 0–0.1)
BASOPHILS/100 LEUKOCYTES IN BLOOD BY AUTOMATED COUNT: 0.9 % (ref 0–2)
BILIRUBIN TOTAL (MG/DL) IN SER/PLAS: 0.5 MG/DL (ref 0–1.2)
CALCIDIOL (25 OH VITAMIN D3) (NG/ML) IN SER/PLAS: 30 NG/ML
CALCIUM (MG/DL) IN SER/PLAS: 10.1 MG/DL (ref 8.6–10.6)
CALCIUM (MG/DL) IN SER/PLAS: 9.9 MG/DL (ref 8.6–10.6)
CARBON DIOXIDE, TOTAL (MMOL/L) IN SER/PLAS: 27 MMOL/L (ref 21–32)
CARBON DIOXIDE, TOTAL (MMOL/L) IN SER/PLAS: 27 MMOL/L (ref 21–32)
CHLORIDE (MMOL/L) IN SER/PLAS: 107 MMOL/L (ref 98–107)
CHLORIDE (MMOL/L) IN SER/PLAS: 107 MMOL/L (ref 98–107)
CHOLESTEROL (MG/DL) IN SER/PLAS: 190 MG/DL (ref 0–199)
CHOLESTEROL IN HDL (MG/DL) IN SER/PLAS: 84.6 MG/DL
CHOLESTEROL/HDL RATIO: 2.2
COBALAMIN (VITAMIN B12) (PG/ML) IN SER/PLAS: 280 PG/ML (ref 211–911)
CREATININE (MG/DL) IN SER/PLAS: 1.27 MG/DL (ref 0.5–1.05)
CREATININE (MG/DL) IN SER/PLAS: 1.27 MG/DL (ref 0.5–1.05)
EOSINOPHILS (10*3/UL) IN BLOOD BY AUTOMATED COUNT: 0.12 X10E9/L (ref 0–0.7)
EOSINOPHILS/100 LEUKOCYTES IN BLOOD BY AUTOMATED COUNT: 2.2 % (ref 0–6)
ERYTHROCYTE DISTRIBUTION WIDTH (RATIO) BY AUTOMATED COUNT: 13.4 % (ref 11.5–14.5)
ERYTHROCYTE MEAN CORPUSCULAR HEMOGLOBIN CONCENTRATION (G/DL) BY AUTOMATED: 33.3 G/DL (ref 32–36)
ERYTHROCYTE MEAN CORPUSCULAR VOLUME (FL) BY AUTOMATED COUNT: 87 FL (ref 80–100)
ERYTHROCYTES (10*6/UL) IN BLOOD BY AUTOMATED COUNT: 4.96 X10E12/L (ref 4–5.2)
ESTIMATED AVERAGE GLUCOSE FOR HBA1C: 117 MG/DL
FERRITIN (UG/LL) IN SER/PLAS: 36 UG/L (ref 8–150)
GFR FEMALE: 46 ML/MIN/1.73M2
GFR FEMALE: 46 ML/MIN/1.73M2
GLUCOSE (MG/DL) IN SER/PLAS: 83 MG/DL (ref 74–99)
GLUCOSE (MG/DL) IN SER/PLAS: 86 MG/DL (ref 74–99)
HEMATOCRIT (%) IN BLOOD BY AUTOMATED COUNT: 43.2 % (ref 36–46)
HEMOGLOBIN (G/DL) IN BLOOD: 14.4 G/DL (ref 12–16)
HEMOGLOBIN A1C/HEMOGLOBIN TOTAL IN BLOOD: 5.7 %
IMMATURE GRANULOCYTES/100 LEUKOCYTES IN BLOOD BY AUTOMATED COUNT: 0.2 % (ref 0–0.9)
IRON (UG/DL) IN SER/PLAS: 87 UG/DL (ref 35–150)
IRON BINDING CAPACITY (UG/DL) IN SER/PLAS: 422 UG/DL (ref 240–445)
IRON SATURATION (%) IN SER/PLAS: 21 % (ref 25–45)
LDL: 91 MG/DL (ref 0–99)
LEUKOCYTES (10*3/UL) IN BLOOD BY AUTOMATED COUNT: 5.4 X10E9/L (ref 4.4–11.3)
LYMPHOCYTES (10*3/UL) IN BLOOD BY AUTOMATED COUNT: 1.61 X10E9/L (ref 1.2–4.8)
LYMPHOCYTES/100 LEUKOCYTES IN BLOOD BY AUTOMATED COUNT: 29.8 % (ref 13–44)
MONOCYTES (10*3/UL) IN BLOOD BY AUTOMATED COUNT: 0.46 X10E9/L (ref 0.1–1)
MONOCYTES/100 LEUKOCYTES IN BLOOD BY AUTOMATED COUNT: 8.5 % (ref 2–10)
NEUTROPHILS (10*3/UL) IN BLOOD BY AUTOMATED COUNT: 3.15 X10E9/L (ref 1.2–7.7)
NEUTROPHILS/100 LEUKOCYTES IN BLOOD BY AUTOMATED COUNT: 58.4 % (ref 40–80)
NRBC (PER 100 WBCS) BY AUTOMATED COUNT: 0 /100 WBC (ref 0–0)
PARATHYRIN INTACT (PG/ML) IN SER/PLAS: 57.9 PG/ML (ref 18.5–88)
PHOSPHATE (MG/DL) IN SER/PLAS: 4.2 MG/DL (ref 2.5–4.9)
PLATELETS (10*3/UL) IN BLOOD AUTOMATED COUNT: 257 X10E9/L (ref 150–450)
POTASSIUM (MMOL/L) IN SER/PLAS: 4.5 MMOL/L (ref 3.5–5.3)
POTASSIUM (MMOL/L) IN SER/PLAS: 4.8 MMOL/L (ref 3.5–5.3)
PROTEIN TOTAL: 6.5 G/DL (ref 6.4–8.2)
SODIUM (MMOL/L) IN SER/PLAS: 144 MMOL/L (ref 136–145)
SODIUM (MMOL/L) IN SER/PLAS: 145 MMOL/L (ref 136–145)
THYROTROPIN (MIU/L) IN SER/PLAS BY DETECTION LIMIT <= 0.05 MIU/L: 2.67 MIU/L (ref 0.44–3.98)
TRIGLYCERIDE (MG/DL) IN SER/PLAS: 72 MG/DL (ref 0–149)
UREA NITROGEN (MG/DL) IN SER/PLAS: 26 MG/DL (ref 6–23)
UREA NITROGEN (MG/DL) IN SER/PLAS: 28 MG/DL (ref 6–23)
VLDL: 14 MG/DL (ref 0–40)

## 2023-09-19 PROCEDURE — 80061 LIPID PANEL: CPT

## 2023-09-19 PROCEDURE — 83540 ASSAY OF IRON: CPT

## 2023-09-19 PROCEDURE — 84443 ASSAY THYROID STIM HORMONE: CPT

## 2023-09-19 PROCEDURE — 82607 VITAMIN B-12: CPT

## 2023-09-19 PROCEDURE — 80053 COMPREHEN METABOLIC PANEL: CPT

## 2023-09-19 PROCEDURE — 36415 COLL VENOUS BLD VENIPUNCTURE: CPT

## 2023-09-19 PROCEDURE — 83036 HEMOGLOBIN GLYCOSYLATED A1C: CPT

## 2023-09-19 PROCEDURE — 83550 IRON BINDING TEST: CPT

## 2023-09-19 PROCEDURE — 82728 ASSAY OF FERRITIN: CPT

## 2023-10-02 ENCOUNTER — TELEMEDICINE CLINICAL SUPPORT (OUTPATIENT)
Dept: PHARMACY | Facility: HOSPITAL | Age: 68
End: 2023-10-02
Payer: MEDICARE

## 2023-10-02 DIAGNOSIS — N18.31 STAGE 3A CHRONIC KIDNEY DISEASE (MULTI): Primary | ICD-10-CM

## 2023-10-02 NOTE — OP NOTE
Post Operative Note:     PreOp Diagnosis: Lumbosacral radiculitis   Post-Procedure Diagnosis: Lumbosacral radiculitis   Procedure: 1.  Caudal epidural steroid injection  under fluoroscopic guidance   Surgeon: Dr. Rin Resendez MD, PhD   Resident/Fellow/Other Assistant: Dr. Jose Burns M.D.   Estimated Blood Loss (mL): none   Specimen: no   Findings: None     Operative Report Dictated:  Dictation: not applicable - note contains Operative  Report   Operative Report:    68-year-old female presenting with acute onset left lower back pain with radiation into the posterior left leg.  Review of patient's MRI reveals small lateral  disc bulge at L5-S1 causing moderate stenosis of the right neural foramen.  Patient has mild neuroforaminal narrowing on the left as well.  Today the patient is scheduled for caudal epidural steroid injection.    After informed consent was obtained, the patient was brought to the operating room and placed in the prone position.  Pulse oximetry and blood pressure cuff were placed.  The back area was prepped and draped in the usual sterile fashion.  Using fluoroscopic  guidance, the skin and subcutaneous tissue overlying needle trajectory to the sacral hiatus was anesthetized with 0.5% lidocaine.  A 22-gauge Chiba needle was then introduced into the caudal epidural space.  Injection of contrast revealed appropriate  epidural spread without vascular uptake.  Subsequently, 15 mL of 0.5% lidocaine and 40 mg of methylprednisolone were injected.  The needle was removed.  The patient tolerated the procedure well and was then transferred to the recovery room in stable condition.     FOLLOW UP:  The patient will update us on their response to this procedure, and agrees to continue currently prescribed/recommended therapies        Attestation:   Note Completion:  I am a: Resident/Fellow   Attending Attestation I was present for the entire procedure         Electronic Signatures:  Jose Burns  (Fellow))  (Signed 02-May-2023 11:41)   Authored: Post Operative Note, Note Completion  Rin Resendez)  (Signed 02-May-2023 12:11)   Authored: Post Operative Note   Co-Signer: Post Operative Note, Note Completion      Last Updated: 02-May-2023 12:11 by Rin Resendez)

## 2023-10-02 NOTE — PROGRESS NOTES
Subjective   Patient ID: Harman or Ms Eveline Mosquera is a 68 y.o. female who presents for Chronic Kidney Disease (New start sglt2).    Referring Provider: Skylar RENDON  HPI: CKD stage 3. last EGFR 46 sCr 1.27  HTN: controlled  /82 at last ov  Medications  Amlodipine 10mg every day    Glucose: well controlled and monitored    HLD: controlled  LDL 91  On statin? Atorvastatin 10mg    Medications reviewed for appropriate dosing in setting of CKD  Recommended changes:  - no adjustments needed at this time    Objective     There were no vitals taken for this visit.     Labs  Lab Results   Component Value Date    BILITOT 0.5 09/19/2023    CALCIUM 9.9 09/19/2023    CO2 27 09/19/2023     09/19/2023    CREATININE 1.27 (H) 09/19/2023    GLUCOSE 83 09/19/2023    ALKPHOS 101 09/19/2023    K 4.5 09/19/2023    PROT 6.5 09/19/2023     09/19/2023    AST 19 09/19/2023    ALT 17 09/19/2023    BUN 26 (H) 09/19/2023    ANIONGAP 15 09/19/2023    MG 2.27 12/12/2018    PHOS 4.2 09/19/2023    ALBUMIN 4.2 09/19/2023    LIPASE 14 03/14/2019    GFRF 46 (A) 09/19/2023     Lab Results   Component Value Date    TRIG 72 09/19/2023    CHOL 190 09/19/2023    HDL 84.6 09/19/2023     Lab Results   Component Value Date    HGBA1C 5.7 (A) 09/19/2023       Current Outpatient Medications on File Prior to Visit   Medication Sig Dispense Refill    amLODIPine (Norvasc) 5 mg tablet Take 1 tablet (5 mg) by mouth once daily.      amoxicillin-pot clavulanate (Augmentin) 875-125 mg tablet Take 1 tablet (875 mg) by mouth in the morning and 1 tablet (875 mg) before bedtime.      atorvastatin (Lipitor) 10 mg tablet Take 1 tablet (10 mg) by mouth once daily.      blood sugar diagnostic (Easy Touch Test Strip) strip 4 times a day.      calcium carbonate (Tums) 200 mg calcium chewable tablet Chew.      calcium carbonate-vitamin D3 (Oscal-500) 500 mg-10 mcg (400 unit) tablet Take 1 tablet by mouth once daily.      cyclobenzaprine (Flexeril) 5  mg tablet Take 1 tablet (5 mg) by mouth.      dicyclomine (Bentyl) 10 mg capsule Take 1 capsule (10 mg) by mouth if needed.      levothyroxine (Synthroid, Levoxyl) 75 mcg tablet Take 1 tablet (75 mcg) by mouth once daily.      metoclopramide (Reglan) 5 mg tablet Take 1 tablet (5 mg) by mouth 2 times a day. 60 tablet 11    omeprazole (PriLOSEC) 40 mg DR capsule Take 1 capsule (40 mg) by mouth once daily in the morning. Take before meals.      ondansetron (Zofran) 4 mg tablet Take by mouth.       No current facility-administered medications on file prior to visit.        Assessment/Plan   PATIENT EDUCATION/DISCUSSION:  - Counseled patient on MOA, expectations, side effects, duration of therapy, contraindications, administration, and monitoring parameters  - Answered all patient questions and concerns  - jardiance preferred on patients insurance plan and pt would like to continue to fill at [facility]  - normal monthly copay $47  _______________________________________________________________________  PLAN  1. START Jardiance 10mg chad Paz, PharmD    Continue all meds under the continuation of care with the referring provider and clinical pharmacy team.

## 2023-10-06 NOTE — PROGRESS NOTES
GENERAL SURGERY/TRAUMA  HISTORY AND PHYSICAL/CONSULT    Date: 10/6/2023 Time: 9:29 AM    Name: Marissa Mosquera  MRN: [id number]    This is a 68 y.o. female presenting for yearly follow up . She is learning to manage the gaastroparesis.  She is having some diarrhea that she cannot relate to SIBO.  Dietician talked to Fernando dsouza to the sibo abx.  In the meantime, gastroparesis is manageable.  The diarrhea hard to manage.  Used probiotics and yogurt without relief.  Also since last visit has 50% renal function.  They are concerned aboput the nexium and the abx for the sibo.   She is on continuous augmentin and wants to get off of this.    She notes the diarrhea is unpredictable with a 2 day episode that can last 12 hours.     She is not sure of any precipating factors.  She is going ot a  today.     Had fried chicken, yogurt, tea.  No time to eat and felt full due to gastroparesis.  Did not want to take zofran as makes her too sleepy.   Has her gb.      Cannot digest runny yolk.  Does fine with a scrambled egg.  Trying to follow the diet. Even a liquid diet made it bad.  Has roasted carrots.  No more raw bveggiues.     GERD - Health Related Quality of Life Questionnaire (GERD- HRQL)    On PPI    How bad is the heartburn? 3 = Symptoms bothersome every day  Heartburn when lying down? 2 = Symptoms noticeable and bothersome but not every day  Heartburn when standing up? 3 = Symptoms bothersome every day  Heartburn after meals? 3 = Symptoms bothersome every day  Does heartburn change your diet? 2 = Symptoms noticeable and bothersome but not every day  Does heartburn wake you from sleep? 2 = Symptoms noticeable and bothersome but not every day  Do you have difficulty swallowing? 2 = Symptoms noticeable and bothersome but not every day  Do you have pain with swallowing? 0 = No symptoms  If you take medication, does this affect your daily life? 0 = No symptoms  How bad is the regurgitation? 0 = No symptoms  Regurgitation  when lying down? 0 = No symptoms  Regurgitation when standing up? 0 = No symptoms  Regurgitation after meals? 0 = No symptoms  Does regurgitation change your diet? 0 = No symptoms  Does regurgitation wake you from sleep? 0 = No symptoms  How satisfied are you with your present condition? Dissatisfied    Total score (calculated by summing the individual scores of questions 1-15): 17   Greatest possible score 75 (worst symptoms).   Lowest possible score 0 (no symptoms).    Heartburn score (calculated by summing the individual scores of questions 1-6): 13   Worst heartburn symptoms: 30.   No heartburn symptoms: 0.   Score less than or equal to 12 with each individual question not exceeding 2 indicate heartburn elimination.    Regurgitation score (calculated by summing the individual scores of questions 10-15): 0   Worst regurgitation symptoms: 30.   No regurgitation symptoms: 0.   Score less than or equal to 12 with each individual question not exceeding 2 indicate regurgitation elimination.        PAST MEDICAL HISTORY:  Past Medical History:   Diagnosis Date    Anesthesia of skin 04/20/2015    Finger numbness    Arthritis 2008    Cataract 2018    Chronic kidney disease 2023    Conjunctival cysts, left eye 08/14/2018    Conjunctival cyst of left eye    Disease of thyroid gland 1985    GERD (gastroesophageal reflux disease) 1976    Hypertension 2015    Hypertensive retinopathy, bilateral 08/20/2019    Hypertensive retinopathy of both eyes    Other conditions influencing health status 02/15/2017    Compression fracture    Peptic ulceration 2023    Personal history of diseases of the skin and subcutaneous tissue 11/01/2018    History of urticaria    Personal history of other diseases of the circulatory system 11/01/2018    History of hypertension    Personal history of other specified conditions 10/31/2018    History of diarrhea    Personal history of other specified conditions 07/27/2015    History of vertigo    Retinal  hemorrhage, left eye 02/13/2018    Retinal hemorrhage of left eye    Varicella 1957    Visual impairment 1963        PAST SURGICAL HISTORY:  Past Surgical History:   Procedure Laterality Date    BREAST SURGERY  2007    COLONOSCOPY  06/08/2016    Complete Colonoscopy    CT HEAD ANGIO W AND WO IV CONTRAST  06/28/2018    CT HEAD ANGIO W AND WO IV CONTRAST 6/28/2018 [facility]    CT HEAD ANGIO W AND WO IV CONTRAST  08/29/2022    CT HEAD ANGIO W AND WO IV CONTRAST 8/29/2022 [facility]    CT NECK ANGIO W AND WO IV CONTRAST  06/28/2018    CT NECK ANGIO W AND WO IV CONTRAST 6/28/2018 [facility]    CT NECK ANGIO W AND WO IV CONTRAST  08/29/2022    CT NECK ANGIO W AND WO IV CONTRAST 8/29/2022 [facility]    FRACTURE SURGERY  1980    GASTRIC FUNDOPLICATION  06/29/2017    Esophagogastric Fundoplasty Nissen Fundoplication    JOINT REPLACEMENT  2011, 2021,2022    MR HEAD ANGIO WO IV CONTRAST  09/29/2021    MR HEAD ANGIO WO IV CONTRAST 9/29/2021 [facility]    OTHER SURGICAL HISTORY  07/17/2017    Esophagogastric Fundoplasty Laparoscopic For Paraesophageal Hernia Repair    OTHER SURGICAL HISTORY  07/17/2017    Gastroplasty Longitudinal    OTHER SURGICAL HISTORY  07/17/2017    Esophageal Lengthening    OTHER SURGICAL HISTORY  07/17/2017    Repair Of Paraesophageal Hiatus Hernia    OTHER SURGICAL HISTORY  12/18/2017    Laparoscopy Repair Of Hernia    OTHER SURGICAL HISTORY  03/05/2019    Shoulder replacement    OTHER SURGICAL HISTORY  04/22/2015    Treatment Of Wrist Fracture    OTHER SURGICAL HISTORY  04/22/2015    Open Treatment Of Clavicular Fracture    OTHER SURGICAL HISTORY  04/22/2015    Breast Surgery Excision Of Breast Single Lesion    OTHER SURGICAL HISTORY  04/22/2015    Parathyroid Complete Parathyroidectomy    SPINE SURGERY  2019    TOTAL KNEE ARTHROPLASTY  04/22/2015    Knee Replacement       FAMILY HISTORY:  Family History   Problem Relation Name Age of Onset    Heart failure Mother  Elisabet     Hypertension Mother Elisabet     Arthritis Mother Elisabet     Depression Mother Elisabet     Early natural death Mother Elisabet     Stroke Mother Elisabet     Heart failure Father Vernon     Heart disease Father Vernon     Other (Sinus problem) Brother      Cancer Other Grandfather     Cancer Maternal Grandfather Jose     Early natural death Maternal Grandfather Jose     Stroke Maternal Grandfather Jose     Alcohol abuse Brother Mario     Diabetes Brother Mario     Hypertension Brother Mario     Alcohol abuse Brother Junior     Drug abuse Brother Junior     Stroke Mother's Sister Katharine         SOCIAL HISTORY:  Social History     Tobacco Use    Smoking status: Never    Smokeless tobacco: Never   Substance Use Topics    Alcohol use: Never    Drug use: Never       MEDICATIONS:  Prior to Admission Medications:    Current Outpatient Medications:     amLODIPine (Norvasc) 5 mg tablet, Take 1 tablet (5 mg) by mouth once daily., Disp: , Rfl:     amoxicillin-pot clavulanate (Augmentin) 875-125 mg tablet, Take 1 tablet (875 mg) by mouth in the morning and 1 tablet (875 mg) before bedtime., Disp: , Rfl:     atorvastatin (Lipitor) 10 mg tablet, Take 1 tablet (10 mg) by mouth once daily., Disp: , Rfl:     blood sugar diagnostic (Easy Touch Test Strip) strip, 4 times a day., Disp: , Rfl:     calcium carbonate (Tums) 200 mg calcium chewable tablet, Chew., Disp: , Rfl:     calcium carbonate-vitamin D3 (Oscal-500) 500 mg-10 mcg (400 unit) tablet, Take 1 tablet by mouth once daily., Disp: , Rfl:     cyclobenzaprine (Flexeril) 5 mg tablet, Take 1 tablet (5 mg) by mouth., Disp: , Rfl:     dicyclomine (Bentyl) 10 mg capsule, Take 1 capsule (10 mg) by mouth if needed., Disp: , Rfl:     empagliflozin (Jardiance) 10 mg, Take 1 tablet (10 mg) by mouth once daily. TAKE ONE TABLET BY MOUTH ONCE DAILY IN THE MORNING, Disp: 30 tablet, Rfl: 3    levothyroxine (Synthroid, Levoxyl) 75 mcg tablet, Take 1 tablet (75 mcg) by mouth once daily., Disp: ,  Rfl:     metoclopramide (Reglan) 5 mg tablet, Take 1 tablet (5 mg) by mouth 2 times a day., Disp: 60 tablet, Rfl: 11    omeprazole (PriLOSEC) 40 mg DR capsule, Take 1 capsule (40 mg) by mouth once daily in the morning. Take before meals., Disp: , Rfl:     ondansetron (Zofran) 4 mg tablet, Take by mouth., Disp: , Rfl:     ALLERGIES:  Allergies   Allergen Reactions    Adhesive Tape-Silicones Other     removes skin; can not use paper or silk tape    Allopurinol Unknown     flushed, hypertensive    Betamethasone Dipropionate Hallucinations     not allergic but has bad reaction    Hydrocodone Unknown    Lisinopril Diarrhea    Milk Unknown    Mold Other     sinus irritation    Oxycodone Hcl Hallucinations    Peanut Unknown    Penicillins Hives    Pollen Extracts Unknown     sinus irritation    Prednisone Other and Hallucinations    Quetiapine Nausea Only, Other and Unknown    Quinolones Unknown     levaquin-unclear    Sulfa (Sulfonamide Antibiotics) Other     Lips and tongue sore    Swollen tongue and mouth       REVIEW OF SYSTEMS:  GENERAL: Negative for malaise, significant weight loss and fever  NECK: Negative for lumps, goiter, pain and significant neck swelling  RESPIRATORY: Negative for cough, wheezing or shortness of breath.  CARDIOVASCULAR: Negative for chest pain, leg swelling or palpitations.  GI: Negative for abdominal discomfort, blood in stools or black stools or change in bowel habits  : No history of dysuria, frequency or incontinence  MUSCULOSKELETAL: Negative for joint pain or swelling, back pain or muscle pain.  SKIN: Negative for lesions, rash, and itching.  PSYCH: Negative for sleep disturbance, mood disorder and recent psychosocial stressors.  ENDOCRINE: Negative for cold or heat intolerance, polyuria, polydipsia and goiter.    PHYSICAL EXAM:  Vitals:    10/09/23 1003   BP: 161/79   Pulse: 76      General appearance: obese  Skin: warm, no erythema or rashes  Lungs: clear to percussion and  auscultation  Heart: regular rhythm and S1, S2 normal  Abdomen: soft, multiple incisions well healed  Extremities: Normal exam of the extremities. No swelling or pain.    IMPRESSION:  Marissa Mosquera is a 68 y.o. female with Assessment: Patient has excellent pain control and is tolerating their diet.   Energy: good.   Weight loss: stable.  This is a very complex patient  She notes she is now able to manage the gastroparesis, weight is stable and she is on PPI.   Renal doc is concerned about this due to her worsening kidneys.  I reassured the benefit outweighs the risk of being on PPI due to her kidneys.  She is doing ok off the regaln and will keep this off.  Now she needs to see GI regarding the diarrhea and possible IBS.  Also to find an alternative to augmentin for management of the SIBO.      At last visit This is a 67 year old female who presents for follow up after upper endoscopy on 6/23/2022. She has a history of laparoscopic hiatal hernia repair, takedown of plication, and reversal of VBG with gastrogastrostomy in 2016. In the distant past, she had a Slava gastroplasty with plication and VBG done for management of a hiatal hernia and obesity. She has been suffering post prandial nausea, delayed vomiting after eating, diarrhea, and hypoglycemia. She has had these symptoms since surgery.   Past studies have included gastric emptying study 2/14/20 which did not show delayed gastric emptying (82% retained at 1 hour, 6% at 2 hours), SIBO testing in 2019 which was positive and treated, and extensive food allergy testing in 2018 which was grossly normal. Recent workup includes her 6/23 endoscopy which showed Grade D esophaigits (biopsies with no metaplasia, fungus noted), a Hill II valve, and unusual gastric anatomy with persistence of a bridge of tissue partially dividing the upper stomach. UGI 6/29 showed delayed esophageal clearance, no reflux, an rapid gastric emptying.   Patient started on omeprazole and  carafate and feel great. We will refill these meds and this all treats her gastritis. She is eating better and wants to exercise. will have her follow up in 1 year. Also notes intermittent SIBO for which we will send to GI for management.     PLAN:  See GI for the diarrhea, concerned about IBS    This is not a surgical problem.  We will have her see Dr. Tovar for further evaluation and counseling regarding her ongoing diarrhea.  He can also help find an alternative to the augmentin for the management of the SIBO  You can cut the nexium in half  Stay off the reglan

## 2023-10-09 ENCOUNTER — OFFICE VISIT (OUTPATIENT)
Dept: SURGERY | Facility: CLINIC | Age: 68
End: 2023-10-09
Payer: MEDICARE

## 2023-10-09 ENCOUNTER — OFFICE VISIT (OUTPATIENT)
Dept: PRIMARY CARE | Facility: CLINIC | Age: 68
End: 2023-10-09
Payer: MEDICARE

## 2023-10-09 VITALS
HEIGHT: 61 IN | HEART RATE: 79 BPM | TEMPERATURE: 98 F | BODY MASS INDEX: 36.36 KG/M2 | DIASTOLIC BLOOD PRESSURE: 86 MMHG | OXYGEN SATURATION: 96 % | RESPIRATION RATE: 18 BRPM | SYSTOLIC BLOOD PRESSURE: 139 MMHG | WEIGHT: 192.6 LBS

## 2023-10-09 VITALS
SYSTOLIC BLOOD PRESSURE: 161 MMHG | BODY MASS INDEX: 36.06 KG/M2 | HEART RATE: 76 BPM | WEIGHT: 191 LBS | DIASTOLIC BLOOD PRESSURE: 79 MMHG | HEIGHT: 61 IN

## 2023-10-09 DIAGNOSIS — S32.010S COMPRESSION FRACTURE OF L1 VERTEBRA, SEQUELA: ICD-10-CM

## 2023-10-09 DIAGNOSIS — I10 BENIGN ESSENTIAL HYPERTENSION: ICD-10-CM

## 2023-10-09 DIAGNOSIS — K31.84 GASTROPARESIS: ICD-10-CM

## 2023-10-09 DIAGNOSIS — E03.8 OTHER SPECIFIED HYPOTHYROIDISM: ICD-10-CM

## 2023-10-09 DIAGNOSIS — N18.32 STAGE 3B CHRONIC KIDNEY DISEASE (MULTI): ICD-10-CM

## 2023-10-09 DIAGNOSIS — K58.0 IRRITABLE BOWEL SYNDROME WITH DIARRHEA: Primary | ICD-10-CM

## 2023-10-09 DIAGNOSIS — F33.9 DEPRESSION, RECURRENT (CMS-HCC): ICD-10-CM

## 2023-10-09 DIAGNOSIS — E78.5 DYSLIPIDEMIA: ICD-10-CM

## 2023-10-09 DIAGNOSIS — R73.9 HYPERGLYCEMIA: ICD-10-CM

## 2023-10-09 DIAGNOSIS — K58.0 IRRITABLE BOWEL SYNDROME WITH DIARRHEA: ICD-10-CM

## 2023-10-09 DIAGNOSIS — Z00.00 ENCOUNTER FOR ANNUAL WELLNESS EXAM IN MEDICARE PATIENT: Primary | ICD-10-CM

## 2023-10-09 PROBLEM — E66.01 MORBID OBESITY (MULTI): Status: RESOLVED | Noted: 2023-08-31 | Resolved: 2023-10-09

## 2023-10-09 PROBLEM — M54.12 CERVICAL MYELOPATHY WITH CERVICAL RADICULOPATHY (MULTI): Status: RESOLVED | Noted: 2023-08-31 | Resolved: 2023-10-09

## 2023-10-09 PROBLEM — M46.1 SACROILIITIS (CMS-HCC): Status: RESOLVED | Noted: 2023-08-31 | Resolved: 2023-10-09

## 2023-10-09 PROBLEM — N18.4 CKD (CHRONIC KIDNEY DISEASE) STAGE 4, GFR 15-29 ML/MIN (MULTI): Status: RESOLVED | Noted: 2023-08-31 | Resolved: 2023-10-09

## 2023-10-09 PROBLEM — F60.3 BORDERLINE PERSONALITY DISORDER (MULTI): Status: RESOLVED | Noted: 2023-08-31 | Resolved: 2023-10-09

## 2023-10-09 PROBLEM — G95.9 CERVICAL MYELOPATHY WITH CERVICAL RADICULOPATHY (MULTI): Status: RESOLVED | Noted: 2023-08-31 | Resolved: 2023-10-09

## 2023-10-09 PROCEDURE — 99214 OFFICE O/P EST MOD 30 MIN: CPT | Performed by: SURGERY

## 2023-10-09 PROCEDURE — 3008F BODY MASS INDEX DOCD: CPT | Performed by: SURGERY

## 2023-10-09 PROCEDURE — 1160F RVW MEDS BY RX/DR IN RCRD: CPT | Performed by: SURGERY

## 2023-10-09 PROCEDURE — 3008F BODY MASS INDEX DOCD: CPT | Performed by: FAMILY MEDICINE

## 2023-10-09 PROCEDURE — 99213 OFFICE O/P EST LOW 20 MIN: CPT | Performed by: FAMILY MEDICINE

## 2023-10-09 PROCEDURE — 1125F AMNT PAIN NOTED PAIN PRSNT: CPT | Performed by: SURGERY

## 2023-10-09 PROCEDURE — 3079F DIAST BP 80-89 MM HG: CPT | Performed by: FAMILY MEDICINE

## 2023-10-09 PROCEDURE — G0439 PPPS, SUBSEQ VISIT: HCPCS | Performed by: FAMILY MEDICINE

## 2023-10-09 PROCEDURE — 1159F MED LIST DOCD IN RCRD: CPT | Performed by: SURGERY

## 2023-10-09 PROCEDURE — 1125F AMNT PAIN NOTED PAIN PRSNT: CPT | Performed by: FAMILY MEDICINE

## 2023-10-09 PROCEDURE — 3075F SYST BP GE 130 - 139MM HG: CPT | Performed by: FAMILY MEDICINE

## 2023-10-09 PROCEDURE — 3078F DIAST BP <80 MM HG: CPT | Performed by: SURGERY

## 2023-10-09 PROCEDURE — 1170F FXNL STATUS ASSESSED: CPT | Performed by: FAMILY MEDICINE

## 2023-10-09 PROCEDURE — 1036F TOBACCO NON-USER: CPT | Performed by: SURGERY

## 2023-10-09 PROCEDURE — 1159F MED LIST DOCD IN RCRD: CPT | Performed by: FAMILY MEDICINE

## 2023-10-09 PROCEDURE — 1036F TOBACCO NON-USER: CPT | Performed by: FAMILY MEDICINE

## 2023-10-09 PROCEDURE — 3077F SYST BP >= 140 MM HG: CPT | Performed by: SURGERY

## 2023-10-09 PROCEDURE — 1160F RVW MEDS BY RX/DR IN RCRD: CPT | Performed by: FAMILY MEDICINE

## 2023-10-09 RX ORDER — AMLODIPINE BESYLATE 10 MG/1
10 TABLET ORAL DAILY
COMMUNITY
Start: 2023-09-28

## 2023-10-09 ASSESSMENT — ACTIVITIES OF DAILY LIVING (ADL)
GROCERY_SHOPPING: INDEPENDENT
DOING_HOUSEWORK: INDEPENDENT
BATHING: INDEPENDENT
TAKING_MEDICATION: INDEPENDENT
MANAGING_FINANCES: INDEPENDENT
DRESSING: INDEPENDENT

## 2023-10-09 ASSESSMENT — PATIENT HEALTH QUESTIONNAIRE - PHQ9
SUM OF ALL RESPONSES TO PHQ9 QUESTIONS 1 AND 2: 1
1. LITTLE INTEREST OR PLEASURE IN DOING THINGS: NOT AT ALL
SUM OF ALL RESPONSES TO PHQ QUESTIONS 1-9: 6
7. TROUBLE CONCENTRATING ON THINGS, SUCH AS READING THE NEWSPAPER OR WATCHING TELEVISION: NOT AT ALL
10. IF YOU CHECKED OFF ANY PROBLEMS, HOW DIFFICULT HAVE THESE PROBLEMS MADE IT FOR YOU TO DO YOUR WORK, TAKE CARE OF THINGS AT HOME, OR GET ALONG WITH OTHER PEOPLE: SOMEWHAT DIFFICULT
5. POOR APPETITE OR OVEREATING: MORE THAN HALF THE DAYS
2. FEELING DOWN, DEPRESSED OR HOPELESS: SEVERAL DAYS
9. THOUGHTS THAT YOU WOULD BE BETTER OFF DEAD, OR OF HURTING YOURSELF: NOT AT ALL
4. FEELING TIRED OR HAVING LITTLE ENERGY: SEVERAL DAYS
8. MOVING OR SPEAKING SO SLOWLY THAT OTHER PEOPLE COULD HAVE NOTICED. OR THE OPPOSITE, BEING SO FIGETY OR RESTLESS THAT YOU HAVE BEEN MOVING AROUND A LOT MORE THAN USUAL: NOT AT ALL
6. FEELING BAD ABOUT YOURSELF - OR THAT YOU ARE A FAILURE OR HAVE LET YOURSELF OR YOUR FAMILY DOWN: SEVERAL DAYS
3. TROUBLE FALLING OR STAYING ASLEEP OR SLEEPING TOO MUCH: SEVERAL DAYS

## 2023-10-09 ASSESSMENT — ENCOUNTER SYMPTOMS
LOSS OF SENSATION IN FEET: 0
OCCASIONAL FEELINGS OF UNSTEADINESS: 0
DEPRESSION: 0

## 2023-10-09 NOTE — PROGRESS NOTES
"Subjective   Bunny or Ms Eveline Mosquera is a 68 y.o. female who presents for Medicare Annual Wellness Visit Subsequent (Pt being seen today for Medicare Annual Wellness visit /).  HPI  \"BUNNY\"  PMH:  2003 GI resection Sx  2016 reversal Sx   gastric bypass   spontaneous Fx C3-4, L1 despite nml xray DEXA-few courses prolia   SIBO SEVERE, hosp   b/l shoulder replacement  parathyroidiectomy   shingles   HTN   HLD  hypothyroidism   ltd diet d/t MAJOR teeth issues  NO [facility] DOCS  gastritis EGD 6/2022  echo pEF 9/2022  CKD stage 3, nephro   Gastroparesis, GES 3/2023   C-scope due 2025   DEXA nml 7/2022  Ca score 0. 11/2022  retired RN   AWV 10/2023    Lost AUSTYN this wk un expectantly after heart procedure. Sad about this loss but overall mood is ok.     Saw GI today rec IBS-D RF to IBS specialist. Has not tried xifaxamin     Gastroparesis seems better. Did not take reglan.     Seeing nephro and rec to start jardiance      Current Outpatient Medications on File Prior to Visit   Medication Sig Dispense Refill    atorvastatin (Lipitor) 10 mg tablet Take 1 tablet (10 mg) by mouth once daily.      calcium carbonate (Tums) 200 mg calcium chewable tablet Chew.      calcium carbonate-vitamin D3 (Oscal-500) 500 mg-10 mcg (400 unit) tablet Take 1 tablet by mouth once daily.      empagliflozin (Jardiance) 10 mg Take 1 tablet (10 mg) by mouth once daily. TAKE ONE TABLET BY MOUTH ONCE DAILY IN THE MORNING 30 tablet 3    levothyroxine (Synthroid, Levoxyl) 75 mcg tablet Take 1 tablet (75 mcg) by mouth once daily.      omeprazole (PriLOSEC) 40 mg DR capsule Take 1 capsule (40 mg) by mouth once daily in the morning. Take before meals.      ondansetron (Zofran) 4 mg tablet Take by mouth.      [DISCONTINUED] amLODIPine (Norvasc) 5 mg tablet Take 1 tablet (5 mg) by mouth once daily.      [DISCONTINUED] amoxicillin-pot clavulanate (Augmentin) 875-125 mg tablet Take 1 tablet (875 mg) by mouth in the morning and 1 tablet (875 mg) before " "bedtime.      [DISCONTINUED] blood sugar diagnostic (Easy Touch Test Strip) strip 4 times a day.      [DISCONTINUED] cyclobenzaprine (Flexeril) 5 mg tablet Take 1 tablet (5 mg) by mouth.      [DISCONTINUED] dicyclomine (Bentyl) 10 mg capsule Take 1 capsule (10 mg) by mouth if needed.      [DISCONTINUED] metoclopramide (Reglan) 5 mg tablet Take 1 tablet (5 mg) by mouth 2 times a day. (Patient not taking: Reported on 10/9/2023) 60 tablet 11     No current facility-administered medications on file prior to visit.        Patient Health Questionnaire-9 Score: 6           Objective   /86   Pulse 79   Temp 36.7 °C (98 °F)   Resp 18   Ht 1.549 m (5' 0.98\")   Wt 87.4 kg (192 lb 9.6 oz)   SpO2 96%   BMI 36.41 kg/m²    Physical Exam  General: NAD  HEENT:NCAT, PERRLA, nml OP, b/l TM clear   Neck: no cervical KALPANA  Heart: RRR no murmur, no edema   Lungs: CTA b/l, no wheeze or rhonchi   GI: abd soft, nontender, nondistended.   MSK: no c/c/e. nml gait   Skin: warm and dry  Psych: cooperative, appropriate affect  Neuro: speech clear. A&Ox3  3/3 object recall     Assessment/Plan   Problem List Items Addressed This Visit       Benign essential hypertension  -controlled cont current meds       Stage 3 chronic kidney disease (CMS/HCC)  -per nephro   -starting jardiance soon      Compression fracture of L1 lumbar vertebra (CMS/HCC)  -Tx OP Tx w prolia  -last DEXA nml 6/2022      Dyslipidemia  -lipids at goal  -cont statin  -rpt labs 6 mo       Gastroparesis  -stable  -not on meds  -A1c 5.7       Irritable bowel syndrome with diarrhea  -had GI w/up  -was RF by surg today to IBS specialist  -trial of xifaxan       Relevant Medications    rifAXIMin (Xifaxan) 550 mg tablet    Depression, recurrent (CMS/HCC)  -stable     Other Visit Diagnoses       Encounter for annual wellness exam in Medicare patient    -  Primary  Difficult year w GI issues, seeing multiple specialists  BMI: 36, diet is ltd and ex is ltd d/t previous " orthopedic issues  VACC: reviewed, PNA UTD, consider flu/covid/rsv   COLON CA SCRN: rpt 2025  LABS: reviewed w pt at goal besides aforementioned   PAP: n/a dt age  MAMMO: due 4/2024  DEXA: UTD rpt 6/2024  RTC 6 mo labs prior       BMI 36.0-36.9,adult        Hypothyroidism: TSH at goal. RF levo when needed  -f/up 6 mo     preDM2: A1c stable at 5.7 rpt 6 mo

## 2023-10-09 NOTE — PATIENT INSTRUCTIONS
See GI for the diarrhea, concerned about IBS    This is not a surgical problem.  We will have her see Dr. Tovar for further evaluation and counseling regarding her ongoing diarrhea.  He can also help find an alternative to the augmentin for the management of the SIBO  Stay off the reglan

## 2023-10-11 ENCOUNTER — PATIENT MESSAGE (OUTPATIENT)
Dept: PRIMARY CARE | Facility: CLINIC | Age: 68
End: 2023-10-11
Payer: MEDICARE

## 2023-10-12 NOTE — PATIENT COMMUNICATION
Davy's VM is full.  Sent text.  We have coupon for $0 co-pay.  When Davy texts back, I will call pt to advise.

## 2023-11-01 ENCOUNTER — TELEPHONE (OUTPATIENT)
Dept: SURGERY | Facility: CLINIC | Age: 68
End: 2023-11-01
Payer: MEDICARE

## 2023-11-01 NOTE — TELEPHONE ENCOUNTER
Spoke with Harman, apologized for delay in response due to telephone issues. Indicates Dr. Feldman (GI Referral we made 10/9/23) will not take on her case and does not know what to do.  Educated will reach out to Dr. Dumont for plan and call her back.

## 2023-11-02 ENCOUNTER — APPOINTMENT (OUTPATIENT)
Dept: PHARMACY | Facility: HOSPITAL | Age: 68
End: 2023-11-02
Payer: MEDICARE

## 2023-11-13 ENCOUNTER — APPOINTMENT (OUTPATIENT)
Dept: GASTROENTEROLOGY | Facility: HOSPITAL | Age: 68
End: 2023-11-13
Payer: MEDICARE

## 2023-11-29 ENCOUNTER — OFFICE VISIT (OUTPATIENT)
Dept: PRIMARY CARE | Facility: CLINIC | Age: 68
End: 2023-11-29
Payer: MEDICARE

## 2023-11-29 ENCOUNTER — HOSPITAL ENCOUNTER (EMERGENCY)
Facility: HOSPITAL | Age: 68
Discharge: HOME | End: 2023-11-29
Attending: EMERGENCY MEDICINE
Payer: MEDICARE

## 2023-11-29 ENCOUNTER — APPOINTMENT (OUTPATIENT)
Dept: RADIOLOGY | Facility: HOSPITAL | Age: 68
End: 2023-11-29
Payer: MEDICARE

## 2023-11-29 ENCOUNTER — TELEPHONE (OUTPATIENT)
Dept: PRIMARY CARE | Facility: CLINIC | Age: 68
End: 2023-11-29

## 2023-11-29 VITALS
OXYGEN SATURATION: 98 % | WEIGHT: 190 LBS | HEART RATE: 88 BPM | SYSTOLIC BLOOD PRESSURE: 150 MMHG | TEMPERATURE: 97.3 F | BODY MASS INDEX: 35.87 KG/M2 | RESPIRATION RATE: 20 BRPM | HEIGHT: 61 IN | DIASTOLIC BLOOD PRESSURE: 82 MMHG

## 2023-11-29 VITALS
BODY MASS INDEX: 37.22 KG/M2 | WEIGHT: 189.6 LBS | HEART RATE: 67 BPM | OXYGEN SATURATION: 96 % | DIASTOLIC BLOOD PRESSURE: 80 MMHG | TEMPERATURE: 96.8 F | RESPIRATION RATE: 16 BRPM | SYSTOLIC BLOOD PRESSURE: 146 MMHG | HEIGHT: 60 IN

## 2023-11-29 DIAGNOSIS — H66.92 ACUTE OTITIS MEDIA, LEFT: Primary | ICD-10-CM

## 2023-11-29 DIAGNOSIS — R42 VERTIGO: Primary | ICD-10-CM

## 2023-11-29 DIAGNOSIS — R42 VERTIGO: ICD-10-CM

## 2023-11-29 LAB
ALBUMIN SERPL BCP-MCNC: 4.3 G/DL (ref 3.4–5)
ALP SERPL-CCNC: 97 U/L (ref 33–136)
ALT SERPL W P-5'-P-CCNC: 14 U/L (ref 7–45)
ANION GAP SERPL CALC-SCNC: 14 MMOL/L (ref 10–20)
AST SERPL W P-5'-P-CCNC: 17 U/L (ref 9–39)
BASOPHILS # BLD AUTO: 0.05 X10*3/UL (ref 0–0.1)
BASOPHILS NFR BLD AUTO: 0.9 %
BILIRUB SERPL-MCNC: 0.5 MG/DL (ref 0–1.2)
BUN SERPL-MCNC: 21 MG/DL (ref 6–23)
CALCIUM SERPL-MCNC: 9.1 MG/DL (ref 8.6–10.3)
CARDIAC TROPONIN I PNL SERPL HS: 3 NG/L (ref 0–13)
CHLORIDE SERPL-SCNC: 105 MMOL/L (ref 98–107)
CO2 SERPL-SCNC: 26 MMOL/L (ref 21–32)
CREAT SERPL-MCNC: 1.19 MG/DL (ref 0.5–1.05)
EOSINOPHIL # BLD AUTO: 0.13 X10*3/UL (ref 0–0.7)
EOSINOPHIL NFR BLD AUTO: 2.3 %
ERYTHROCYTE [DISTWIDTH] IN BLOOD BY AUTOMATED COUNT: 14.4 % (ref 11.5–14.5)
FLUAV RNA RESP QL NAA+PROBE: NOT DETECTED
FLUBV RNA RESP QL NAA+PROBE: NOT DETECTED
GFR SERPL CREATININE-BSD FRML MDRD: 50 ML/MIN/1.73M*2
GLUCOSE SERPL-MCNC: 86 MG/DL (ref 74–99)
HCT VFR BLD AUTO: 44.5 % (ref 36–46)
HGB BLD-MCNC: 14.3 G/DL (ref 12–16)
IMM GRANULOCYTES # BLD AUTO: 0.01 X10*3/UL (ref 0–0.7)
IMM GRANULOCYTES NFR BLD AUTO: 0.2 % (ref 0–0.9)
LYMPHOCYTES # BLD AUTO: 1.45 X10*3/UL (ref 1.2–4.8)
LYMPHOCYTES NFR BLD AUTO: 25.4 %
MAGNESIUM SERPL-MCNC: 2.29 MG/DL (ref 1.6–2.4)
MCH RBC QN AUTO: 28.2 PG (ref 26–34)
MCHC RBC AUTO-ENTMCNC: 32.1 G/DL (ref 32–36)
MCV RBC AUTO: 88 FL (ref 80–100)
MONOCYTES # BLD AUTO: 0.47 X10*3/UL (ref 0.1–1)
MONOCYTES NFR BLD AUTO: 8.2 %
NEUTROPHILS # BLD AUTO: 3.6 X10*3/UL (ref 1.2–7.7)
NEUTROPHILS NFR BLD AUTO: 63 %
NRBC BLD-RTO: 0 /100 WBCS (ref 0–0)
PLATELET # BLD AUTO: 259 X10*3/UL (ref 150–450)
POTASSIUM SERPL-SCNC: 4.5 MMOL/L (ref 3.5–5.3)
PROT SERPL-MCNC: 6.7 G/DL (ref 6.4–8.2)
RBC # BLD AUTO: 5.07 X10*6/UL (ref 4–5.2)
SARS-COV-2 RNA RESP QL NAA+PROBE: NOT DETECTED
SODIUM SERPL-SCNC: 140 MMOL/L (ref 136–145)
WBC # BLD AUTO: 5.7 X10*3/UL (ref 4.4–11.3)

## 2023-11-29 PROCEDURE — 1125F AMNT PAIN NOTED PAIN PRSNT: CPT | Performed by: FAMILY MEDICINE

## 2023-11-29 PROCEDURE — 36415 COLL VENOUS BLD VENIPUNCTURE: CPT | Performed by: NURSE PRACTITIONER

## 2023-11-29 PROCEDURE — 3008F BODY MASS INDEX DOCD: CPT | Performed by: FAMILY MEDICINE

## 2023-11-29 PROCEDURE — 2500000001 HC RX 250 WO HCPCS SELF ADMINISTERED DRUGS (ALT 637 FOR MEDICARE OP): Performed by: NURSE PRACTITIONER

## 2023-11-29 PROCEDURE — 83735 ASSAY OF MAGNESIUM: CPT | Performed by: NURSE PRACTITIONER

## 2023-11-29 PROCEDURE — 99284 EMERGENCY DEPT VISIT MOD MDM: CPT | Performed by: EMERGENCY MEDICINE

## 2023-11-29 PROCEDURE — 80053 COMPREHEN METABOLIC PANEL: CPT | Performed by: NURSE PRACTITIONER

## 2023-11-29 PROCEDURE — 1159F MED LIST DOCD IN RCRD: CPT | Performed by: FAMILY MEDICINE

## 2023-11-29 PROCEDURE — 71045 X-RAY EXAM CHEST 1 VIEW: CPT | Performed by: RADIOLOGY

## 2023-11-29 PROCEDURE — 3079F DIAST BP 80-89 MM HG: CPT | Performed by: FAMILY MEDICINE

## 2023-11-29 PROCEDURE — 70498 CT ANGIOGRAPHY NECK: CPT

## 2023-11-29 PROCEDURE — 71045 X-RAY EXAM CHEST 1 VIEW: CPT

## 2023-11-29 PROCEDURE — 1160F RVW MEDS BY RX/DR IN RCRD: CPT | Performed by: FAMILY MEDICINE

## 2023-11-29 PROCEDURE — 84484 ASSAY OF TROPONIN QUANT: CPT | Performed by: NURSE PRACTITIONER

## 2023-11-29 PROCEDURE — 70498 CT ANGIOGRAPHY NECK: CPT | Performed by: STUDENT IN AN ORGANIZED HEALTH CARE EDUCATION/TRAINING PROGRAM

## 2023-11-29 PROCEDURE — 85025 COMPLETE CBC W/AUTO DIFF WBC: CPT | Performed by: NURSE PRACTITIONER

## 2023-11-29 PROCEDURE — 2550000001 HC RX 255 CONTRASTS: Performed by: NURSE PRACTITIONER

## 2023-11-29 PROCEDURE — 3077F SYST BP >= 140 MM HG: CPT | Performed by: FAMILY MEDICINE

## 2023-11-29 PROCEDURE — 99215 OFFICE O/P EST HI 40 MIN: CPT | Performed by: FAMILY MEDICINE

## 2023-11-29 PROCEDURE — 87636 SARSCOV2 & INF A&B AMP PRB: CPT | Performed by: NURSE PRACTITIONER

## 2023-11-29 PROCEDURE — 1036F TOBACCO NON-USER: CPT | Performed by: FAMILY MEDICINE

## 2023-11-29 PROCEDURE — 70496 CT ANGIOGRAPHY HEAD: CPT | Performed by: STUDENT IN AN ORGANIZED HEALTH CARE EDUCATION/TRAINING PROGRAM

## 2023-11-29 RX ORDER — AMOXICILLIN 400 MG/5ML
500 POWDER, FOR SUSPENSION ORAL ONCE
Status: DISCONTINUED | OUTPATIENT
Start: 2023-11-29 | End: 2023-11-29

## 2023-11-29 RX ORDER — MECLIZINE HYDROCHLORIDE 25 MG/1
25 TABLET ORAL 3 TIMES DAILY PRN
Qty: 30 TABLET | Refills: 0 | Status: SHIPPED | OUTPATIENT
Start: 2023-11-29 | End: 2023-12-09

## 2023-11-29 RX ORDER — AMOXICILLIN 250 MG/1
500 CAPSULE ORAL ONCE
Status: DISCONTINUED | OUTPATIENT
Start: 2023-11-29 | End: 2023-11-29

## 2023-11-29 RX ORDER — AMOXICILLIN 500 MG/1
1000 CAPSULE ORAL ONCE
Status: COMPLETED | OUTPATIENT
Start: 2023-11-29 | End: 2023-11-29

## 2023-11-29 RX ORDER — AMOXICILLIN 500 MG/1
1000 TABLET, FILM COATED ORAL 2 TIMES DAILY
Qty: 40 TABLET | Refills: 0 | Status: SHIPPED | OUTPATIENT
Start: 2023-11-29 | End: 2023-12-09

## 2023-11-29 RX ORDER — AMOXICILLIN 250 MG/1
CAPSULE ORAL
Status: DISCONTINUED
Start: 2023-11-29 | End: 2023-11-29 | Stop reason: HOSPADM

## 2023-11-29 RX ORDER — MECLIZINE HYDROCHLORIDE 25 MG/1
25 TABLET ORAL ONCE
Status: COMPLETED | OUTPATIENT
Start: 2023-11-29 | End: 2023-11-29

## 2023-11-29 RX ADMIN — AMOXICILLIN 1000 MG: 250 CAPSULE ORAL at 16:18

## 2023-11-29 RX ADMIN — IOHEXOL 90 ML: 350 INJECTION, SOLUTION INTRAVENOUS at 14:56

## 2023-11-29 RX ADMIN — MECLIZINE HYDROCHLORIDE 25 MG: 25 TABLET ORAL at 13:43

## 2023-11-29 ASSESSMENT — LIFESTYLE VARIABLES
EVER HAD A DRINK FIRST THING IN THE MORNING TO STEADY YOUR NERVES TO GET RID OF A HANGOVER: NO
HAVE PEOPLE ANNOYED YOU BY CRITICIZING YOUR DRINKING: NO
HAVE YOU EVER FELT YOU SHOULD CUT DOWN ON YOUR DRINKING: NO
REASON UNABLE TO ASSESS: NO
EVER FELT BAD OR GUILTY ABOUT YOUR DRINKING: NO

## 2023-11-29 ASSESSMENT — HEART SCORE
HISTORY: SLIGHTLY SUSPICIOUS
HEART SCORE: 3
AGE: 65+
ECG: NORMAL
RISK FACTORS: 1-2 RISK FACTORS
TROPONIN: LESS THAN OR EQUAL TO NORMAL LIMIT

## 2023-11-29 ASSESSMENT — COLUMBIA-SUICIDE SEVERITY RATING SCALE - C-SSRS
6. HAVE YOU EVER DONE ANYTHING, STARTED TO DO ANYTHING, OR PREPARED TO DO ANYTHING TO END YOUR LIFE?: NO
1. IN THE PAST MONTH, HAVE YOU WISHED YOU WERE DEAD OR WISHED YOU COULD GO TO SLEEP AND NOT WAKE UP?: NO
2. HAVE YOU ACTUALLY HAD ANY THOUGHTS OF KILLING YOURSELF?: NO

## 2023-11-29 ASSESSMENT — PAIN - FUNCTIONAL ASSESSMENT: PAIN_FUNCTIONAL_ASSESSMENT: 0-10

## 2023-11-29 ASSESSMENT — PAIN SCALES - GENERAL: PAINLEVEL_OUTOF10: 0 - NO PAIN

## 2023-11-29 NOTE — TELEPHONE ENCOUNTER
11/28 pm dizziness, nauseous all night, Rt arm sharp pain from pit to thumb intermittent 5 min.   Today dizzy  & can't raise head from pillow.   No difficulty breathing.   Pt asked to be seen here, does not want UC.

## 2023-11-29 NOTE — ED PROVIDER NOTES
HPI   Chief Complaint   Patient presents with    Dizziness     Pt sent by her PCP Dr. Behm for dizziness that had a sudden onset last night. Pt also has c/o weakness, diarrhea, cough, and headache.        68-year-old female presents today with acute onset vertigo that started at 8 PM last night.  She was sitting in a chair and felt the entire room shift.  She thought maybe she was experiencing a hypoglycemic event as she is a diabetic and she tried orange juice and a cookie but it did not give her any relief.  She has a feeling of nausea but she has not vomited.  She denies becoming diaphoretic.  She denies fever or chills.  She indicates that she is unable to walk due to the vertigo.  She notes that when she became acutely weak with vertigo she started to drool from the right side of her mouth.  She gave a COVID test herself 1-1/2 weeks ago after she returned from a trip in [place] and it was negative.  She presently denies chest pain but had experienced chest pain on and off since yesterday.  She denies cough.  She denies melena or hematochezia.  She denies dysuria or hematuria.      History provided by:  Patient   used: No                        No data recorded       NIH Stroke Scale: 0          Patient History   Past Medical History:   Diagnosis Date    Anesthesia of skin 04/20/2015    Finger numbness    Arthritis 2008    Cataract 2018    Chronic kidney disease 2023    Conjunctival cysts, left eye 08/14/2018    Conjunctival cyst of left eye    Disease of thyroid gland 1985    GERD (gastroesophageal reflux disease) 1976    Hypertension 2015    Hypertensive retinopathy, bilateral 08/20/2019    Hypertensive retinopathy of both eyes    Other conditions influencing health status 02/15/2017    Compression fracture    Peptic ulceration 2023    Personal history of diseases of the skin and subcutaneous tissue 11/01/2018    History of urticaria    Personal history of other diseases of the  circulatory system 11/01/2018    History of hypertension    Personal history of other specified conditions 10/31/2018    History of diarrhea    Personal history of other specified conditions 07/27/2015    History of vertigo    Retinal hemorrhage, left eye 02/13/2018    Retinal hemorrhage of left eye    Varicella 1957    Visual impairment 1963     Past Surgical History:   Procedure Laterality Date    BREAST SURGERY  2007    COLONOSCOPY  06/08/2016    Complete Colonoscopy    CT ANGIO NECK  06/28/2018    CT NECK ANGIO W AND WO IV CONTRAST 6/28/2018 [facility]    CT ANGIO NECK  08/29/2022    CT NECK ANGIO W AND WO IV CONTRAST 8/29/2022 [facility]    CT HEAD ANGIO W AND WO IV CONTRAST  06/28/2018    CT HEAD ANGIO W AND WO IV CONTRAST 6/28/2018 [facility]    CT HEAD ANGIO W AND WO IV CONTRAST  08/29/2022    CT HEAD ANGIO W AND WO IV CONTRAST 8/29/2022 [facility]    FRACTURE SURGERY  1980    GASTRIC FUNDOPLICATION  06/29/2017    Esophagogastric Fundoplasty Nissen Fundoplication    JOINT REPLACEMENT  2011, 2021,2022    MR HEAD ANGIO WO IV CONTRAST  09/29/2021    MR HEAD ANGIO WO IV CONTRAST 9/29/2021 [facility]    OTHER SURGICAL HISTORY  07/17/2017    Esophagogastric Fundoplasty Laparoscopic For Paraesophageal Hernia Repair    OTHER SURGICAL HISTORY  07/17/2017    Gastroplasty Longitudinal    OTHER SURGICAL HISTORY  07/17/2017    Esophageal Lengthening    OTHER SURGICAL HISTORY  07/17/2017    Repair Of Paraesophageal Hiatus Hernia    OTHER SURGICAL HISTORY  12/18/2017    Laparoscopy Repair Of Hernia    OTHER SURGICAL HISTORY  03/05/2019    Shoulder replacement    OTHER SURGICAL HISTORY  04/22/2015    Treatment Of Wrist Fracture    OTHER SURGICAL HISTORY  04/22/2015    Open Treatment Of Clavicular Fracture    OTHER SURGICAL HISTORY  04/22/2015    Breast Surgery Excision Of Breast Single Lesion    OTHER SURGICAL HISTORY  04/22/2015    Parathyroid Complete Parathyroidectomy    SPINE  SURGERY  2019    TOTAL KNEE ARTHROPLASTY  04/22/2015    Knee Replacement     Family History   Problem Relation Name Age of Onset    Heart failure Mother Elisabet     Hypertension Mother Elisabet     Arthritis Mother Elisabet     Depression Mother Elisabet     Early natural death Mother Elisabet     Stroke Mother Elisabet     Heart failure Father Vernon     Heart disease Father Vernon     Other (Sinus problem) Brother      Cancer Other Grandfather     Cancer Maternal Grandfather Jose     Early natural death Maternal Grandfather Jose     Stroke Maternal Grandfather Jose     Alcohol abuse Brother Mario     Diabetes Brother Mario     Hypertension Brother Mario     Alcohol abuse Brother Junior     Drug abuse Brother Junior     Stroke Mother's Sister Katharine      Social History     Tobacco Use    Smoking status: Never    Smokeless tobacco: Never   Substance Use Topics    Alcohol use: Never    Drug use: Never       Physical Exam   ED Triage Vitals [11/29/23 1246]   Temp Heart Rate Resp BP   36 °C (96.8 °F) 67 16 146/80      SpO2 Temp src Heart Rate Source Patient Position   96 % -- -- --      BP Location FiO2 (%)     -- --       Physical Exam  Constitutional:       Appearance: She is obese.   HENT:      Head: Normocephalic and atraumatic.      Right Ear: Tympanic membrane normal.      Ears:      Comments:  tympanic membrane erythematous and tender to the touch during examination     Nose: Nose normal.      Mouth/Throat:      Mouth: Mucous membranes are dry.   Eyes:      Extraocular Movements: Extraocular movements intact.      Pupils: Pupils are equal, round, and reactive to light.   Cardiovascular:      Rate and Rhythm: Normal rate and regular rhythm.   Pulmonary:      Effort: Pulmonary effort is normal.      Breath sounds: Normal breath sounds.   Abdominal:      General: Abdomen is flat.      Palpations: Abdomen is soft.   Musculoskeletal:         General: Normal range of motion.      Cervical back: Normal range of motion and neck  "supple.   Skin:     General: Skin is warm.      Capillary Refill: Capillary refill takes less than 2 seconds.   Neurological:      General: No focal deficit present.      Mental Status: She is alert and oriented to person, place, and time.   Psychiatric:         Mood and Affect: Mood normal.         Behavior: Behavior normal.         ED Course & MDM   Diagnoses as of 11/29/23 1609   Acute otitis media, left   Vertigo       Medical Decision Making  Patient received meclizine and within an hour of receiving meclizine I was able to her walk around the emergency department without any difficulty and patient indicated \"I could not do this this morning\".  Her tympanic membrane on the left side was erythematous and painful during inspection.  Because patient was experiencing acute onset vertigo that started at 8 PM last night I was concerned about a posterior infarct.  Her hints exam was negative.  Her NIH was 0.  Stroke van was negative.  She was alert and oriented and did not appear to be in acute distress.  She was started on amoxicillin with concern for otitis media.  Her EKG was normal sinus rhythm.  Her [place] interval was normal.  Her QT corrected was normal.  There was no ST elevation or depression.  The T wave in lead III was inverted but 2 and aVF were normal.  She was collected for COVID and flu but she wants to follow [facility] for results.  She was denying chest pain or dyspnea.  Her metabolic panel was essentially normal and her creatinine and GFR were better than normal for patient and she will continue to hydrate appropriately.  Her magnesium was normal.  Her troponin was only 3.  Her CBC was negative for leukocytosis or left shift.  Her chest x-ray had no acute cardiopulmonary process.  Her CT angio head and neck was negative for acute stenosis of the cervical vessels.  There was no evidence of a significant stenosis or large branch vessel cut off.  Return precautions reviewed and patient was seen and staffed " with attending.  Follow-up with PCP as needed        Procedure  Procedures     Miguel Angel Nunez, APRN-CNP  11/29/23 3332

## 2023-11-29 NOTE — PROGRESS NOTES
"Subjective   Marissa Mosquera \"Harman or Ms Mosquera\" is a 68 y.o. female who presents for Dizziness, Headache, and Shortness of Breath.  HPI  1 mo ago w cough and URI was getting better.   Had episode of sudden dizziness last night. Thought low BS and OJ and cookies did not help. Tried water did not help. Then got diarrhea.   Pain R arm for 5 min.   Dizziness and diarrhea cont through the night. +nausea. Very weak. No urinary Sx. +HA and pressure behind R eye.   No LE or LE strength.   Sob w going up stair.   Room spinning off balance.   Covid neg x 2   /62, 62 HR   Started jardiance 1 mo ago.   No cp. No fever. No vomiting constant nausea   2 mo LE edema   Current Outpatient Medications on File Prior to Visit   Medication Sig Dispense Refill    amLODIPine (Norvasc) 10 mg tablet Take 1 tablet (10 mg) by mouth once daily.      atorvastatin (Lipitor) 10 mg tablet Take 1 tablet (10 mg) by mouth once daily.      calcium carbonate (Tums) 200 mg calcium chewable tablet Chew.      calcium carbonate-vitamin D3 (Oscal-500) 500 mg-10 mcg (400 unit) tablet Take 1 tablet by mouth once daily.      empagliflozin (Jardiance) 10 mg Take 1 tablet (10 mg) by mouth once daily. TAKE ONE TABLET BY MOUTH ONCE DAILY IN THE MORNING 30 tablet 3    levothyroxine (Synthroid, Levoxyl) 75 mcg tablet Take 1 tablet (75 mcg) by mouth once daily.      omeprazole (PriLOSEC) 40 mg DR capsule Take 1 capsule (40 mg) by mouth once daily in the morning. Take before meals.      ondansetron (Zofran) 4 mg tablet Take by mouth.       No current facility-administered medications on file prior to visit.                  Objective   /82   Pulse 88   Temp 36.3 °C (97.3 °F)   Resp 20   Ht 1.549 m (5' 0.98\")   Wt 86.2 kg (190 lb)   SpO2 98%   BMI 35.92 kg/m²    Physical Exam  General: moderate distress, appears weak  HEENT:NCAT, PERRLA, dry mucous membranes. Clear rhinorrhea   Neck: no cervical KALPANA  Heart: RRR no murmur  Lungs: CTA b/l, no wheeze " or rhonchi, tahypneic   GI: abd soft, nontender, nondistended.   MSK: no c/c  +1 LE edema   Unsteady balance but able to walk w out assistance  Skin: warm and dry  Psych: cooperative, appropriate affect  Neuro: speech clear. A&Ox3  Assessment/Plan   Problem List Items Addressed This Visit    None  Visit Diagnoses       Vertigo    -  Primary  -Pt w multiple chronic conditions w severe SIBO/chronic diarrhea undergoing GI workup and eval next week presents w sudden onset severe vertigo last night w worsening of chronic diarrhea  -vitals stable  -Ddx broad but need to r/o cerebellar CVA  -advised ER  - pt agreed and  drove her to [facility] ER. Called ER but after 30 min still unable to speak w provider

## 2023-12-04 ENCOUNTER — TELEMEDICINE (OUTPATIENT)
Dept: PHARMACY | Facility: HOSPITAL | Age: 68
End: 2023-12-04
Payer: MEDICARE

## 2023-12-04 DIAGNOSIS — N18.31 STAGE 3A CHRONIC KIDNEY DISEASE (MULTI): Primary | ICD-10-CM

## 2023-12-04 NOTE — PROGRESS NOTES
Subjective   Patient ID: Harman or Ms Eveline Mosquera is a 68 y.o. female who presents for No chief complaint on file..    Referring Provider: Skylar Norwood     HPI  HPI: CKD stage 3. last EGFR 46 sCr 1.27. now 1 month on jardiance. Patient reports tolerating well. No reports of painful/difficult urination. Did report some dizziness/lightheadness when originally starting medication but reports that this improved after about a week or so and with increased water intake. Will continue at 10mg for now and patient will follow up with dr norwood in feb 2023  HTN: controlled  /82 at last ov  Medications  Amlodipine 10mg every day    Glucose: well controlled and monitored    HLD: controlled  LDL 91  On statin? Atorvastatin 10mg    Medications reviewed for appropriate dosing in setting of CKD  Recommended changes:  - no adjustments needed at this time    Objective     There were no vitals taken for this visit.     Labs  Lab Results   Component Value Date    BILITOT 0.5 11/29/2023    CALCIUM 9.1 11/29/2023    CO2 26 11/29/2023     11/29/2023    CREATININE 1.19 (H) 11/29/2023    GLUCOSE 86 11/29/2023    ALKPHOS 97 11/29/2023    K 4.5 11/29/2023    PROT 6.7 11/29/2023     11/29/2023    AST 17 11/29/2023    ALT 14 11/29/2023    BUN 21 11/29/2023    ANIONGAP 14 11/29/2023    MG 2.29 11/29/2023    PHOS 4.2 09/19/2023    ALBUMIN 4.3 11/29/2023    LIPASE 14 03/14/2019    GFRF 46 (A) 09/19/2023     Lab Results   Component Value Date    TRIG 72 09/19/2023    CHOL 190 09/19/2023    HDL 84.6 09/19/2023     Lab Results   Component Value Date    HGBA1C 5.7 (A) 09/19/2023       Current Outpatient Medications on File Prior to Visit   Medication Sig Dispense Refill    amLODIPine (Norvasc) 10 mg tablet Take 1 tablet (10 mg) by mouth once daily.      amoxicillin (Amoxil) 500 mg tablet Take 2 tablets (1,000 mg) by mouth 2 times a day for 10 days. 40 tablet 0    atorvastatin (Lipitor) 10 mg tablet Take 1 tablet (10 mg) by  mouth once daily.      calcium carbonate (Tums) 200 mg calcium chewable tablet Chew.      calcium carbonate-vitamin D3 (Oscal-500) 500 mg-10 mcg (400 unit) tablet Take 1 tablet by mouth once daily.      empagliflozin (Jardiance) 10 mg Take 1 tablet (10 mg) by mouth once daily. TAKE ONE TABLET BY MOUTH ONCE DAILY IN THE MORNING 30 tablet 3    levothyroxine (Synthroid, Levoxyl) 75 mcg tablet Take 1 tablet (75 mcg) by mouth once daily.      meclizine (Antivert) 25 mg tablet Take 1 tablet (25 mg) by mouth 3 times a day as needed for dizziness for up to 10 days. 30 tablet 0    omeprazole (PriLOSEC) 40 mg DR capsule Take 1 capsule (40 mg) by mouth once daily in the morning. Take before meals.      ondansetron (Zofran) 4 mg tablet Take by mouth.       No current facility-administered medications on file prior to visit.        Assessment/Plan   PATIENT EDUCATION/DISCUSSION:  - Counseled patient on MOA, expectations, side effects, duration of therapy, contraindications, administration, and monitoring parameters  - Answered all patient questions and concerns  - jardiance preferred on patients insurance plan and pt would like to continue to fill at [facility]  - normal monthly copay $47  _______________________________________________________________________  PLAN  1. START Jardiance 10mg chad Paz, PharmD    Continue all meds under the continuation of care with the referring provider and clinical pharmacy team.

## 2023-12-05 ENCOUNTER — OFFICE VISIT (OUTPATIENT)
Dept: GASTROENTEROLOGY | Facility: HOSPITAL | Age: 68
End: 2023-12-05
Payer: MEDICARE

## 2023-12-05 VITALS
RESPIRATION RATE: 20 BRPM | TEMPERATURE: 96.8 F | OXYGEN SATURATION: 96 % | WEIGHT: 192.2 LBS | DIASTOLIC BLOOD PRESSURE: 79 MMHG | BODY MASS INDEX: 37.54 KG/M2 | HEART RATE: 74 BPM | SYSTOLIC BLOOD PRESSURE: 138 MMHG

## 2023-12-05 DIAGNOSIS — K90.9 DIARRHEA DUE TO MALABSORPTION (HHS-HCC): ICD-10-CM

## 2023-12-05 DIAGNOSIS — R19.7 DIARRHEA DUE TO MALABSORPTION (HHS-HCC): ICD-10-CM

## 2023-12-05 DIAGNOSIS — R10.84 GENERALIZED ABDOMINAL PAIN: Primary | ICD-10-CM

## 2023-12-05 PROCEDURE — 99214 OFFICE O/P EST MOD 30 MIN: CPT | Performed by: NURSE PRACTITIONER

## 2023-12-05 PROCEDURE — 3078F DIAST BP <80 MM HG: CPT | Performed by: NURSE PRACTITIONER

## 2023-12-05 PROCEDURE — 1160F RVW MEDS BY RX/DR IN RCRD: CPT | Performed by: NURSE PRACTITIONER

## 2023-12-05 PROCEDURE — 1159F MED LIST DOCD IN RCRD: CPT | Performed by: NURSE PRACTITIONER

## 2023-12-05 PROCEDURE — 1036F TOBACCO NON-USER: CPT | Performed by: NURSE PRACTITIONER

## 2023-12-05 PROCEDURE — 3075F SYST BP GE 130 - 139MM HG: CPT | Performed by: NURSE PRACTITIONER

## 2023-12-05 PROCEDURE — 1125F AMNT PAIN NOTED PAIN PRSNT: CPT | Performed by: NURSE PRACTITIONER

## 2023-12-05 PROCEDURE — 3008F BODY MASS INDEX DOCD: CPT | Performed by: NURSE PRACTITIONER

## 2023-12-05 SDOH — ECONOMIC STABILITY: FOOD INSECURITY: WITHIN THE PAST 12 MONTHS, THE FOOD YOU BOUGHT JUST DIDN'T LAST AND YOU DIDN'T HAVE MONEY TO GET MORE.: NEVER TRUE

## 2023-12-05 SDOH — ECONOMIC STABILITY: FOOD INSECURITY: WITHIN THE PAST 12 MONTHS, YOU WORRIED THAT YOUR FOOD WOULD RUN OUT BEFORE YOU GOT MONEY TO BUY MORE.: NEVER TRUE

## 2023-12-05 ASSESSMENT — LIFESTYLE VARIABLES
HOW OFTEN DO YOU HAVE A DRINK CONTAINING ALCOHOL: NEVER
AUDIT-C TOTAL SCORE: 0
SKIP TO QUESTIONS 9-10: 1
HOW OFTEN DO YOU HAVE SIX OR MORE DRINKS ON ONE OCCASION: NEVER
HOW MANY STANDARD DRINKS CONTAINING ALCOHOL DO YOU HAVE ON A TYPICAL DAY: PATIENT DOES NOT DRINK

## 2023-12-05 ASSESSMENT — PATIENT HEALTH QUESTIONNAIRE - PHQ9
2. FEELING DOWN, DEPRESSED OR HOPELESS: NOT AT ALL
SUM OF ALL RESPONSES TO PHQ9 QUESTIONS 1 AND 2: 0
1. LITTLE INTEREST OR PLEASURE IN DOING THINGS: NOT AT ALL

## 2023-12-05 ASSESSMENT — ENCOUNTER SYMPTOMS
LOSS OF SENSATION IN FEET: 0
OCCASIONAL FEELINGS OF UNSTEADINESS: 0
DEPRESSION: 0

## 2023-12-05 ASSESSMENT — PAIN SCALES - GENERAL: PAINLEVEL: 3

## 2023-12-05 NOTE — PATIENT INSTRUCTIONS
SIBO- I would recommend repeating your breath test to see how your levels are at this time. If still elevated I would recommend a different antibiotic for treatment as you have not responded well to augmentin.    Abdominal pain- post parandial_ I am concerned about pancreatic insufficency or possible vascular occlusion/ narrowing in the SMA or celiac blood vessels. I would recommend getting a stool study for fecal elastace or MRA to assess the blood vessels.     I will call you with your results and determine follow up

## 2023-12-05 NOTE — PROGRESS NOTES
Subjective   Patient ID: Harman or Ms Eveline Mosquera is a 68 y.o. female.    HPI  68 year old female here for possible IBS, chronic diarrhea  Medical history includes gastroparesis, SIBO  Gastric stapling surgery with Dr. Chaparro and was reversed  She was previously treated by Fernando Miles with antibiotics she was on Augmentin  3/30/2023 gastric emptying study showed 32% at 4 hours  3/17/2023 EGD showed gastritis and normal duodenum  3/21/2023 hydrogen levels 2-177  CH 4 15-42  11/29/2023 normal LFTs and CBC  Feels frustrated that no one knows what is going on with her  Referred by Dr. Chaparro  Pain over her duodenum, pain one hr after eating and no matter what she is eating  Becomes hypotensive, eye pain and dizziness, even with water  Small frequent meals  Decreased quality of life   Episodes resolve by putting a cold wash cloth on her head for 90 min  Weight has been stable  Not sure when she will fall over or have to lay down  BM daily  Denies blood in her stool  Sometimes greasy and floating stools  Tight pain feeling , feels like tighten a belt , gets light headed and eye ache  Sometimes explosive diarrhea but not always      Objective   Physical Exam  Cardiovascular:      Rate and Rhythm: Normal rate and regular rhythm.      Pulses: Normal pulses.      Heart sounds: Normal heart sounds.   Pulmonary:      Effort: Pulmonary effort is normal.      Breath sounds: Normal breath sounds.   Abdominal:      General: Bowel sounds are normal.      Palpations: Abdomen is soft.         Assessment/Plan   SIBO- I would recommend repeating your breath test to see how your levels are at this time. If still elevated I would recommend a different antibiotic for treatment as you have not responded well to augmentin.    Abdominal pain- post parandial_ I am concerned about pancreatic insufficency or possible vascular occlusion/ narrowing in the SMA or celiac blood vessels. I would recommend getting a stool study for fecal elastace  or MRA to assess the blood vessels.     I will call you with your results and determine follow up

## 2023-12-21 ENCOUNTER — HOSPITAL ENCOUNTER (OUTPATIENT)
Dept: RADIOLOGY | Facility: HOSPITAL | Age: 68
Discharge: HOME | End: 2023-12-21
Payer: MEDICARE

## 2023-12-21 DIAGNOSIS — R10.84 GENERALIZED ABDOMINAL PAIN: ICD-10-CM

## 2023-12-21 PROCEDURE — A9575 INJ GADOTERATE MEGLUMI 0.1ML: HCPCS | Performed by: NURSE PRACTITIONER

## 2023-12-21 PROCEDURE — 2550000001 HC RX 255 CONTRASTS: Performed by: NURSE PRACTITIONER

## 2023-12-21 PROCEDURE — 74185 MRA ABD W OR W/O CNTRST: CPT

## 2023-12-21 RX ORDER — GADOTERATE MEGLUMINE 376.9 MG/ML
18 INJECTION INTRAVENOUS
Status: COMPLETED | OUTPATIENT
Start: 2023-12-21 | End: 2023-12-21

## 2023-12-21 RX ADMIN — GADOTERATE MEGLUMINE 18 ML: 376.9 INJECTION INTRAVENOUS at 08:43

## 2024-01-02 DIAGNOSIS — N18.31 STAGE 3A CHRONIC KIDNEY DISEASE (MULTI): ICD-10-CM

## 2024-01-03 ENCOUNTER — PROCEDURE VISIT (OUTPATIENT)
Dept: GASTROENTEROLOGY | Facility: CLINIC | Age: 69
End: 2024-01-03
Payer: MEDICARE

## 2024-01-03 DIAGNOSIS — R10.84 GENERALIZED ABDOMINAL PAIN: ICD-10-CM

## 2024-01-03 DIAGNOSIS — K90.9 DIARRHEA DUE TO MALABSORPTION (HHS-HCC): ICD-10-CM

## 2024-01-03 DIAGNOSIS — R19.7 DIARRHEA DUE TO MALABSORPTION (HHS-HCC): ICD-10-CM

## 2024-01-03 PROCEDURE — 91065 BREATH HYDROGEN/METHANE TEST: CPT | Performed by: INTERNAL MEDICINE

## 2024-01-03 NOTE — PROGRESS NOTES
"Patient ID: Marissa Mosquera \"Harman or Ms Mosquera\" is a 68 y.o. female.    Breath Hydrogen Test-Bacterial Overgrowth    Date/Time: 1/3/2024 11:22 AM    Performed by: Bessie Mitchell RN  Authorized by: RICKY Yu    Consent:     Consent obtained:  Verbal    Consent given by:  Patient  Universal protocol:     Patient identity confirmed:  Verbally with patient  Sedation:     Sedation type:  None  Anesthesia:     Anesthesia method:  None  Post-procedure details:     Procedure completion:  Tolerated with difficulty  Hydrogen Breath Analysis Consultation Sheet    Referring Provider: Ricky Yu  [address]  Department Of [place] [address]    Indication: R/O SIBO    Symptoms: Generalized abdominal pain; diarrhea due to malabsorption    Age: 68 y.o.  Weight: There were no vitals filed for this visit.  Substrate: Glucose   Dose: 75gms    Last Meal: 1/2 18:00  Recent Antibiotics: No    RESULTS:   Time PPM (H2) APPM* (CH4) CO2 Correction   Baseline #1 8:23 3 27 6.9 0.80   Baseline #2 8:25 3 26 5.8 0.95   *Challenge Dose Sugar: 8:27  15' 8:45 7 25 4.1 1.34   30' 9:00 41 57 4.5 1.22   45' 9:15 39 54 5.1 1.07   60' 9:30 20 32 6.6 0.84   75' 9:45 89 119 5.8 0.95   90' 10:00 87 118 6.2 0.89   105' 10:15 67 99 5.8 0.95   120' 10:30 87 121 5.8 0.95   135'        150'        165'        180'         About 15 minutes after finishing the substrate.Pt had nausea, dizziness, abdominal pain. She was hyperventilating, shaking and sweating . She also had cold hands and feet. Her symptoms subsided a little bit about 15-30 minutes later.    Impression: Positive for SIBO and methane    "

## 2024-01-04 DIAGNOSIS — K63.8219 SMALL INTESTINAL BACTERIAL OVERGROWTH: Primary | ICD-10-CM

## 2024-01-04 RX ORDER — METRONIDAZOLE 500 MG/1
500 TABLET ORAL 2 TIMES DAILY
Qty: 28 TABLET | Refills: 0 | Status: SHIPPED | OUTPATIENT
Start: 2024-01-04 | End: 2024-01-18

## 2024-01-08 RX ORDER — EMPAGLIFLOZIN 10 MG/1
TABLET, FILM COATED ORAL
Qty: 90 TABLET | Refills: 3 | Status: SHIPPED | OUTPATIENT
Start: 2024-01-08

## 2024-01-16 DIAGNOSIS — K58.0 IRRITABLE BOWEL SYNDROME WITH DIARRHEA: Primary | ICD-10-CM

## 2024-02-07 ENCOUNTER — OFFICE VISIT (OUTPATIENT)
Dept: NEPHROLOGY | Facility: CLINIC | Age: 69
End: 2024-02-07
Payer: MEDICARE

## 2024-02-07 VITALS
BODY MASS INDEX: 37.69 KG/M2 | TEMPERATURE: 98 F | SYSTOLIC BLOOD PRESSURE: 143 MMHG | HEART RATE: 74 BPM | WEIGHT: 193 LBS | DIASTOLIC BLOOD PRESSURE: 83 MMHG

## 2024-02-07 DIAGNOSIS — N18.31 HYPERTENSIVE KIDNEY DISEASE WITH STAGE 3A CHRONIC KIDNEY DISEASE (MULTI): Primary | ICD-10-CM

## 2024-02-07 DIAGNOSIS — I12.9 HYPERTENSIVE KIDNEY DISEASE WITH STAGE 3A CHRONIC KIDNEY DISEASE (MULTI): Primary | ICD-10-CM

## 2024-02-07 PROCEDURE — 3079F DIAST BP 80-89 MM HG: CPT | Performed by: INTERNAL MEDICINE

## 2024-02-07 PROCEDURE — 1125F AMNT PAIN NOTED PAIN PRSNT: CPT | Performed by: INTERNAL MEDICINE

## 2024-02-07 PROCEDURE — 99213 OFFICE O/P EST LOW 20 MIN: CPT | Performed by: INTERNAL MEDICINE

## 2024-02-07 PROCEDURE — 1036F TOBACCO NON-USER: CPT | Performed by: INTERNAL MEDICINE

## 2024-02-07 PROCEDURE — 1159F MED LIST DOCD IN RCRD: CPT | Performed by: INTERNAL MEDICINE

## 2024-02-07 PROCEDURE — 1157F ADVNC CARE PLAN IN RCRD: CPT | Performed by: INTERNAL MEDICINE

## 2024-02-07 PROCEDURE — 1160F RVW MEDS BY RX/DR IN RCRD: CPT | Performed by: INTERNAL MEDICINE

## 2024-02-07 PROCEDURE — 3077F SYST BP >= 140 MM HG: CPT | Performed by: INTERNAL MEDICINE

## 2024-02-07 PROCEDURE — 3008F BODY MASS INDEX DOCD: CPT | Performed by: INTERNAL MEDICINE

## 2024-02-07 RX ORDER — RIFAXIMIN 550 MG/1
TABLET ORAL 3 TIMES DAILY
COMMUNITY
Start: 2024-01-31 | End: 2024-03-13 | Stop reason: WASHOUT

## 2024-02-07 NOTE — PROGRESS NOTES
Chief Complaint: Follow up CKD    No specific complaints  Denies nausea, vomiting, chest pain, dyspnea  No urinary symptoms    NAD  Sclera AI s inj  MMM, no sores  Deferred secondary to COVID  No edema  No tremor  No rash    CKD stage 3a  HTN not at goal  Proteinuria not recently assessed    Discussed initiation of another blood pressure agent  Patient refused at this time, wants eval of SBO and wants to discuss with cardiology  Screening proteinuria  Follow up as needed, patient prefers in one year  Needs screening labs (GFR, urine microalbumin to creatinine) twice yearly; communicated to PCP

## 2024-02-13 ENCOUNTER — LAB (OUTPATIENT)
Dept: LAB | Facility: LAB | Age: 69
End: 2024-02-13
Payer: MEDICARE

## 2024-02-13 DIAGNOSIS — K90.9 DIARRHEA DUE TO MALABSORPTION (HHS-HCC): ICD-10-CM

## 2024-02-13 DIAGNOSIS — R19.7 DIARRHEA DUE TO MALABSORPTION (HHS-HCC): ICD-10-CM

## 2024-02-13 DIAGNOSIS — K52.9 CHRONIC DIARRHEA: ICD-10-CM

## 2024-02-13 PROCEDURE — 87328 CRYPTOSPORIDIUM AG IA: CPT

## 2024-02-13 PROCEDURE — 87329 GIARDIA AG IA: CPT

## 2024-02-13 PROCEDURE — 82653 EL-1 FECAL QUANTITATIVE: CPT

## 2024-02-16 LAB
CRYPTOSP AG STL QL IA: NEGATIVE
ELASTASE PANC STL-MCNT: 234 UG/G
G LAMBLIA AG STL QL IA: NEGATIVE

## 2024-02-17 LAB — O+P STL MICRO: NEGATIVE

## 2024-03-06 DIAGNOSIS — E03.9 HYPOTHYROIDISM, UNSPECIFIED: ICD-10-CM

## 2024-03-06 DIAGNOSIS — E78.5 HYPERLIPIDEMIA, UNSPECIFIED: ICD-10-CM

## 2024-03-07 RX ORDER — ATORVASTATIN CALCIUM 10 MG/1
10 TABLET, FILM COATED ORAL DAILY
Qty: 90 TABLET | Refills: 3 | Status: SHIPPED | OUTPATIENT
Start: 2024-03-07

## 2024-03-07 RX ORDER — LEVOTHYROXINE SODIUM 75 UG/1
75 TABLET ORAL DAILY
Qty: 90 TABLET | Refills: 3 | Status: SHIPPED | OUTPATIENT
Start: 2024-03-07

## 2024-03-13 ENCOUNTER — OFFICE VISIT (OUTPATIENT)
Dept: GASTROENTEROLOGY | Facility: CLINIC | Age: 69
End: 2024-03-13
Payer: MEDICARE

## 2024-03-13 VITALS
SYSTOLIC BLOOD PRESSURE: 136 MMHG | HEIGHT: 61 IN | TEMPERATURE: 97.9 F | HEART RATE: 84 BPM | WEIGHT: 190 LBS | BODY MASS INDEX: 35.87 KG/M2 | DIASTOLIC BLOOD PRESSURE: 79 MMHG

## 2024-03-13 DIAGNOSIS — R53.83 OTHER FATIGUE: ICD-10-CM

## 2024-03-13 DIAGNOSIS — K63.8219 SMALL INTESTINAL BACTERIAL OVERGROWTH: Primary | ICD-10-CM

## 2024-03-13 PROCEDURE — 1126F AMNT PAIN NOTED NONE PRSNT: CPT | Performed by: NURSE PRACTITIONER

## 2024-03-13 PROCEDURE — 3008F BODY MASS INDEX DOCD: CPT | Performed by: NURSE PRACTITIONER

## 2024-03-13 PROCEDURE — 3075F SYST BP GE 130 - 139MM HG: CPT | Performed by: NURSE PRACTITIONER

## 2024-03-13 PROCEDURE — 3078F DIAST BP <80 MM HG: CPT | Performed by: NURSE PRACTITIONER

## 2024-03-13 PROCEDURE — 99214 OFFICE O/P EST MOD 30 MIN: CPT | Performed by: NURSE PRACTITIONER

## 2024-03-13 PROCEDURE — 1159F MED LIST DOCD IN RCRD: CPT | Performed by: NURSE PRACTITIONER

## 2024-03-13 PROCEDURE — 1157F ADVNC CARE PLAN IN RCRD: CPT | Performed by: NURSE PRACTITIONER

## 2024-03-13 PROCEDURE — 1160F RVW MEDS BY RX/DR IN RCRD: CPT | Performed by: NURSE PRACTITIONER

## 2024-03-13 PROCEDURE — 1036F TOBACCO NON-USER: CPT | Performed by: NURSE PRACTITIONER

## 2024-03-13 ASSESSMENT — PAIN SCALES - GENERAL: PAINLEVEL: 0-NO PAIN

## 2024-03-13 NOTE — PROGRESS NOTES
Subjective   Patient ID: Harman or Ms Eveline Mosquera is a 69 y.o. female.    HPI  69-year-old female for follow-up of SIBO    Previously seen 12/5/2023  1/3/2024 hydrogen breath test went from 3-89  Methane   She was treated with Xifaxan x 2 rounds  Finished 2nd round 2 weeks ago   2/13/2024 negative stool for O&P  Fecal elastase 234  12/21/2023 MRI showed a fat-containing incisional hernia, tiny subcutaneous fluid pocket within the left upper abdominal wall from prior gastrostomy tract 1.3 cm.  No significant narrowing of celiac trunk or SMA  Still some belching  BM: good now  Worries about eating  Still gets some regurgitation at times  Some dizziness and headaches  Has to drink a lot of water        Objective   Physical Exam  Cardiovascular:      Rate and Rhythm: Normal rate and regular rhythm.      Pulses: Normal pulses.      Heart sounds: Normal heart sounds.   Pulmonary:      Effort: Pulmonary effort is normal.      Breath sounds: Normal breath sounds.   Abdominal:      General: Bowel sounds are normal.      Palpations: Abdomen is soft.         Assessment/Plan     Small intestinal bacterial overgrowth- you have been feeling after treatment with the diflucan. I would recommend starting pro-biotics in 3 months to help keep your bacterial monique more balanced. I would recommend small more frequent meals to help with your symptoms as well. I will order a consult with a dietician as well to help you start to increase your diet- I would recommend Tanya Meeyr. I will order labs to check for vitamin deficency and the causes of your fatigue. If you have a recurrence of your symptoms I would recommend re-treating with diflucan again due to your response this last round.    Dizziness - I would recommend discussing your cardiologist about getting tested for ESPINOZA syndrome , I would recommend increasing you water intake.    I will see you back for follow up in 3-4 months

## 2024-03-13 NOTE — PATIENT INSTRUCTIONS
Small intestinal bacterial overgrowth- you have been feeling after treatment with the diflucan. I would recommend starting pro-biotics in 3 months to help keep your bacterial monique more balanced. I would recommend small more frequent meals to help with your symptoms as well. I will order a consult with a dietician as well to help you start to increase your diet. I would recommend Tanya Meyer. I will order labs to check for vitamin deficency and the causes of your fatigue. If you have a recurrence of your symptoms I would recommend re-treating with diflucan again due to your response this last round.    Dizziness - I would recommend discussing your cardiologist about getting tested for ESPINOZA syndrome , I would recommend increasing you water intake.    I will see you back for follow up in 3-4 months

## 2024-03-18 ENCOUNTER — LAB (OUTPATIENT)
Dept: LAB | Facility: LAB | Age: 69
End: 2024-03-18
Payer: MEDICARE

## 2024-03-18 DIAGNOSIS — E78.5 DYSLIPIDEMIA: ICD-10-CM

## 2024-03-18 DIAGNOSIS — R73.9 HYPERGLYCEMIA: ICD-10-CM

## 2024-03-18 DIAGNOSIS — R53.83 OTHER FATIGUE: ICD-10-CM

## 2024-03-18 DIAGNOSIS — E03.8 OTHER SPECIFIED HYPOTHYROIDISM: ICD-10-CM

## 2024-03-18 LAB
ALBUMIN SERPL BCP-MCNC: 4.5 G/DL (ref 3.4–5)
ALP SERPL-CCNC: 103 U/L (ref 33–136)
ALT SERPL W P-5'-P-CCNC: 15 U/L (ref 7–45)
ANION GAP SERPL CALC-SCNC: 15 MMOL/L (ref 10–20)
AST SERPL W P-5'-P-CCNC: 17 U/L (ref 9–39)
BASOPHILS # BLD AUTO: 0.06 X10*3/UL (ref 0–0.1)
BASOPHILS NFR BLD AUTO: 0.9 %
BILIRUB SERPL-MCNC: 0.5 MG/DL (ref 0–1.2)
BUN SERPL-MCNC: 20 MG/DL (ref 6–23)
CALCIUM SERPL-MCNC: 9.6 MG/DL (ref 8.6–10.6)
CHLORIDE SERPL-SCNC: 105 MMOL/L (ref 98–107)
CO2 SERPL-SCNC: 26 MMOL/L (ref 21–32)
CREAT SERPL-MCNC: 1.22 MG/DL (ref 0.5–1.05)
EGFRCR SERPLBLD CKD-EPI 2021: 48 ML/MIN/1.73M*2
EOSINOPHIL # BLD AUTO: 0.15 X10*3/UL (ref 0–0.7)
EOSINOPHIL NFR BLD AUTO: 2.3 %
ERYTHROCYTE [DISTWIDTH] IN BLOOD BY AUTOMATED COUNT: 14 % (ref 11.5–14.5)
EST. AVERAGE GLUCOSE BLD GHB EST-MCNC: 117 MG/DL
GLUCOSE SERPL-MCNC: 85 MG/DL (ref 74–99)
HBA1C MFR BLD: 5.7 %
HCT VFR BLD AUTO: 47.5 % (ref 36–46)
HGB BLD-MCNC: 14.9 G/DL (ref 12–16)
IMM GRANULOCYTES # BLD AUTO: 0.02 X10*3/UL (ref 0–0.7)
IMM GRANULOCYTES NFR BLD AUTO: 0.3 % (ref 0–0.9)
LYMPHOCYTES # BLD AUTO: 1.61 X10*3/UL (ref 1.2–4.8)
LYMPHOCYTES NFR BLD AUTO: 24.8 %
MAGNESIUM SERPL-MCNC: 2.46 MG/DL (ref 1.6–2.4)
MCH RBC QN AUTO: 27.4 PG (ref 26–34)
MCHC RBC AUTO-ENTMCNC: 31.4 G/DL (ref 32–36)
MCV RBC AUTO: 87 FL (ref 80–100)
MONOCYTES # BLD AUTO: 0.52 X10*3/UL (ref 0.1–1)
MONOCYTES NFR BLD AUTO: 8 %
NEUTROPHILS # BLD AUTO: 4.13 X10*3/UL (ref 1.2–7.7)
NEUTROPHILS NFR BLD AUTO: 63.7 %
NRBC BLD-RTO: 0 /100 WBCS (ref 0–0)
PLATELET # BLD AUTO: 277 X10*3/UL (ref 150–450)
POTASSIUM SERPL-SCNC: 4.7 MMOL/L (ref 3.5–5.3)
PROT SERPL-MCNC: 6.7 G/DL (ref 6.4–8.2)
RBC # BLD AUTO: 5.44 X10*6/UL (ref 4–5.2)
SODIUM SERPL-SCNC: 141 MMOL/L (ref 136–145)
TSH SERPL-ACNC: 2.5 MIU/L (ref 0.44–3.98)
VIT B12 SERPL-MCNC: 221 PG/ML (ref 211–911)
WBC # BLD AUTO: 6.5 X10*3/UL (ref 4.4–11.3)

## 2024-03-18 PROCEDURE — 83036 HEMOGLOBIN GLYCOSYLATED A1C: CPT

## 2024-03-18 PROCEDURE — 36415 COLL VENOUS BLD VENIPUNCTURE: CPT

## 2024-03-18 PROCEDURE — 84443 ASSAY THYROID STIM HORMONE: CPT

## 2024-03-18 PROCEDURE — 83735 ASSAY OF MAGNESIUM: CPT

## 2024-03-18 PROCEDURE — 84630 ASSAY OF ZINC: CPT

## 2024-03-18 PROCEDURE — 80053 COMPREHEN METABOLIC PANEL: CPT

## 2024-03-18 PROCEDURE — 82607 VITAMIN B-12: CPT

## 2024-03-18 PROCEDURE — 85025 COMPLETE CBC W/AUTO DIFF WBC: CPT

## 2024-03-20 LAB — ZINC SERPL-MCNC: 76.5 UG/DL (ref 60–120)

## 2024-04-09 ENCOUNTER — OFFICE VISIT (OUTPATIENT)
Dept: PRIMARY CARE | Facility: CLINIC | Age: 69
End: 2024-04-09
Payer: MEDICARE

## 2024-04-09 VITALS
TEMPERATURE: 98.2 F | HEART RATE: 82 BPM | WEIGHT: 193 LBS | RESPIRATION RATE: 18 BRPM | DIASTOLIC BLOOD PRESSURE: 100 MMHG | HEIGHT: 61 IN | BODY MASS INDEX: 36.44 KG/M2 | OXYGEN SATURATION: 98 % | SYSTOLIC BLOOD PRESSURE: 152 MMHG

## 2024-04-09 DIAGNOSIS — E53.8 LOW SERUM VITAMIN B12: ICD-10-CM

## 2024-04-09 DIAGNOSIS — M79.89 LEFT LEG SWELLING: ICD-10-CM

## 2024-04-09 DIAGNOSIS — Z12.31 ENCOUNTER FOR SCREENING MAMMOGRAM FOR BREAST CANCER: ICD-10-CM

## 2024-04-09 DIAGNOSIS — R73.03 PREDIABETES: ICD-10-CM

## 2024-04-09 DIAGNOSIS — I10 HYPERTENSION, UNSPECIFIED TYPE: ICD-10-CM

## 2024-04-09 DIAGNOSIS — G47.8 NON-RESTORATIVE SLEEP: ICD-10-CM

## 2024-04-09 DIAGNOSIS — R68.89 ABNORMAL ENDOCRINE LABORATORY TEST FINDING: ICD-10-CM

## 2024-04-09 DIAGNOSIS — S99.829A TOENAIL TORN AWAY: ICD-10-CM

## 2024-04-09 DIAGNOSIS — N18.32 STAGE 3B CHRONIC KIDNEY DISEASE (MULTI): ICD-10-CM

## 2024-04-09 DIAGNOSIS — G56.02 LEFT CARPAL TUNNEL SYNDROME: ICD-10-CM

## 2024-04-09 DIAGNOSIS — E78.2 MIXED HYPERLIPIDEMIA: ICD-10-CM

## 2024-04-09 DIAGNOSIS — R79.89 LOW VITAMIN B12 LEVEL: Primary | ICD-10-CM

## 2024-04-09 PROCEDURE — 99215 OFFICE O/P EST HI 40 MIN: CPT | Performed by: FAMILY MEDICINE

## 2024-04-09 PROCEDURE — 1125F AMNT PAIN NOTED PAIN PRSNT: CPT | Performed by: FAMILY MEDICINE

## 2024-04-09 PROCEDURE — 1158F ADVNC CARE PLAN TLK DOCD: CPT | Performed by: FAMILY MEDICINE

## 2024-04-09 PROCEDURE — 1159F MED LIST DOCD IN RCRD: CPT | Performed by: FAMILY MEDICINE

## 2024-04-09 PROCEDURE — 1160F RVW MEDS BY RX/DR IN RCRD: CPT | Performed by: FAMILY MEDICINE

## 2024-04-09 PROCEDURE — 3077F SYST BP >= 140 MM HG: CPT | Performed by: FAMILY MEDICINE

## 2024-04-09 PROCEDURE — 3080F DIAST BP >= 90 MM HG: CPT | Performed by: FAMILY MEDICINE

## 2024-04-09 PROCEDURE — 3008F BODY MASS INDEX DOCD: CPT | Performed by: FAMILY MEDICINE

## 2024-04-09 PROCEDURE — 1123F ACP DISCUSS/DSCN MKR DOCD: CPT | Performed by: FAMILY MEDICINE

## 2024-04-09 PROCEDURE — 1157F ADVNC CARE PLAN IN RCRD: CPT | Performed by: FAMILY MEDICINE

## 2024-04-09 ASSESSMENT — PAIN SCALES - GENERAL: PAINLEVEL: 6

## 2024-04-09 NOTE — PROGRESS NOTES
"Subjective   Marissa Mosquera \"Harman or Ms Mosquera\" is a 69 y.o. female who presents for Follow-up (6 month f/up, lab results , few issues).  HPI      L leg/calf pain and swelling. Started jardiance in Nov and leg started swelling. Went to [facility] in dec and wore TEDs. Then got hand and leg cramps. inc water and [employer] hydration and swelling improved. Hard to plantar/dorsi flex so can't go up stairs     Took 2 rounds of xifaxin which helped. Gastroparesis better now since bowels are less inflammed.   Seeing nutritionist in June     130-100/70-80s. No lightheadedness     Sleeping 12-14 hrs and still feeling tired. Not snoring.     Notes clubbing and brittle nails     R toenail ripped    L first 3 fingers burning and tingling    Current Outpatient Medications on File Prior to Visit   Medication Sig Dispense Refill    amLODIPine (Norvasc) 10 mg tablet Take 1 tablet (10 mg) by mouth once daily.      atorvastatin (Lipitor) 10 mg tablet TAKE 1 TABLET BY MOUTH EVERY DAY 90 tablet 3    calcium carbonate (Tums) 200 mg calcium chewable tablet Chew.      empagliflozin (Jardiance) 10 mg Take 1 tablet (10 mg) by mouth once daily. TAKE ONE TABLET BY MOUTH ONCE DAILY IN THE MORNING 90 tablet 3    levothyroxine (Synthroid, Levoxyl) 75 mcg tablet TAKE 1 TABLET BY MOUTH EVERY DAY 90 tablet 3    omeprazole (PriLOSEC) 40 mg DR capsule Take 1 capsule (40 mg) by mouth once daily in the morning. Take before meals.      ondansetron (Zofran) 4 mg tablet Take by mouth.       No current facility-administered medications on file prior to visit.                  Objective   BP (!) 152/100   Pulse 82   Temp 36.8 °C (98.2 °F)   Resp 18   Ht 1.549 m (5' 1\")   Wt 87.5 kg (193 lb)   SpO2 98%   BMI 36.47 kg/m²    Physical Exam  General: NAD  HEENT:NCAT, PERRLA, nml OP  Neck: no cervical KALPANA  Heart: RRR no murmur  Lungs: CTA b/l, no wheeze or rhonchi   MSK: no c/c/e. nml gait   +phalen   L ankle/tibial pain w edema  Neg chele   Skin: warm and " dry  Psych: cooperative, appropriate affect  Neuro: speech clear. A&Ox3  Assessment/Plan   Problem List Items Addressed This Visit       HTN (hypertension)  Home reading much better controlled  Seeings cards soon  No med changes since home readings ok   Cont amlodipine 10        Other Visit Diagnoses       Low vitamin B12 level    -  Primary  B12 low nml   Start OTC b12       Left leg swelling      Stat L LE US r/up DVT  Also check xrays      Relevant Orders    Lower extremity venous duplex left    XR ankle left 2 views    XR tibia fibula left 2 views    Encounter for screening mammogram for breast cancer        Relevant Orders    BI mammo bilateral screening tomosynthesis    Prediabetes      A1c stable   Rpt 6 mo       Non-restorative sleep      Warrant sleep med RF declines for now    L CTS: declines intervention. Can try brace    Toenail trauma: removal not indicated     CKD stage 3: cont w jardiance     Hypothyroid: TSH nml rpt 6 mo       F/up AWV 6 mo lab

## 2024-04-10 ENCOUNTER — TELEPHONE (OUTPATIENT)
Dept: PRIMARY CARE | Facility: CLINIC | Age: 69
End: 2024-04-10

## 2024-04-10 ENCOUNTER — HOSPITAL ENCOUNTER (OUTPATIENT)
Dept: RADIOLOGY | Facility: HOSPITAL | Age: 69
Discharge: HOME | End: 2024-04-10
Payer: MEDICARE

## 2024-04-10 ENCOUNTER — TELEPHONE (OUTPATIENT)
Dept: PRIMARY CARE | Facility: CLINIC | Age: 69
End: 2024-04-10
Payer: MEDICARE

## 2024-04-10 ENCOUNTER — HOSPITAL ENCOUNTER (OUTPATIENT)
Dept: VASCULAR MEDICINE | Facility: HOSPITAL | Age: 69
Discharge: HOME | End: 2024-04-10
Payer: MEDICARE

## 2024-04-10 DIAGNOSIS — M79.89 LEFT LEG SWELLING: ICD-10-CM

## 2024-04-10 DIAGNOSIS — T14.8XXA AVULSION FRACTURE: Primary | ICD-10-CM

## 2024-04-10 PROCEDURE — 73610 X-RAY EXAM OF ANKLE: CPT | Mod: LEFT SIDE | Performed by: RADIOLOGY

## 2024-04-10 PROCEDURE — 93971 EXTREMITY STUDY: CPT | Performed by: SURGERY

## 2024-04-10 PROCEDURE — 73590 X-RAY EXAM OF LOWER LEG: CPT | Mod: LT

## 2024-04-10 PROCEDURE — 73610 X-RAY EXAM OF ANKLE: CPT | Mod: LT

## 2024-04-10 PROCEDURE — 93971 EXTREMITY STUDY: CPT

## 2024-04-10 PROCEDURE — 73590 X-RAY EXAM OF LOWER LEG: CPT | Mod: LEFT SIDE | Performed by: RADIOLOGY

## 2024-04-10 NOTE — TELEPHONE ENCOUNTER
"----- Message from Rowena Torres MA sent at 4/10/2024  2:26 PM EDT -----  Regarding: FW: Need a stat order for mri  Contact: [phone]  Patient also called in office , and she stated the MRI was pushed out since it wasn't ordered as stat like the US , can you change or Advise ? Alayna stated she will assist her with scheduling once completed   ----- Message -----  From: Marissa Mosquera \"Sonnyny or Ms Mosquera\"  Sent: 4/10/2024   1:08 PM EDT  To: [place] [id number] Clinical Support Staff  Subject: Need a stat order for mri                        Hi dr Behm.  I tried both the my chart and called the  for the MRI.  They won’t or can’t give me an appointment until April 24 at the earliest UNLESS you order it as STAT.  Do you feel comfortable with that or should I just go ahead with the 24th.   I really don’t want to wait any longer.  Thanks    "

## 2024-04-10 NOTE — TELEPHONE ENCOUNTER
Called pt   Still in a lot of pain and swelling hard to walk yesterday.   Xray w ?old Fx  Check MRI   RF ortho Dr Kelly

## 2024-04-11 ENCOUNTER — OFFICE VISIT (OUTPATIENT)
Dept: CARDIOLOGY | Facility: CLINIC | Age: 69
End: 2024-04-11
Payer: MEDICARE

## 2024-04-11 ENCOUNTER — OFFICE VISIT (OUTPATIENT)
Dept: ORTHOPEDIC SURGERY | Facility: HOSPITAL | Age: 69
End: 2024-04-11
Payer: MEDICARE

## 2024-04-11 VITALS
HEART RATE: 77 BPM | OXYGEN SATURATION: 95 % | DIASTOLIC BLOOD PRESSURE: 60 MMHG | HEIGHT: 61 IN | WEIGHT: 193 LBS | BODY MASS INDEX: 36.44 KG/M2 | SYSTOLIC BLOOD PRESSURE: 100 MMHG

## 2024-04-11 VITALS — HEIGHT: 61 IN | WEIGHT: 192 LBS | BODY MASS INDEX: 36.25 KG/M2

## 2024-04-11 DIAGNOSIS — I10 BENIGN ESSENTIAL HYPERTENSION: ICD-10-CM

## 2024-04-11 DIAGNOSIS — M84.369A STRESS FRACTURE OF FIBULA, INITIAL ENCOUNTER: ICD-10-CM

## 2024-04-11 DIAGNOSIS — I10 PRIMARY HYPERTENSION: ICD-10-CM

## 2024-04-11 DIAGNOSIS — R00.2 PALPITATIONS: ICD-10-CM

## 2024-04-11 DIAGNOSIS — S86.892A LEFT MEDIAL TIBIAL STRESS SYNDROME, INITIAL ENCOUNTER: Primary | ICD-10-CM

## 2024-04-11 DIAGNOSIS — R40.0 DAYTIME SOMNOLENCE: ICD-10-CM

## 2024-04-11 DIAGNOSIS — E78.00 PURE HYPERCHOLESTEROLEMIA: ICD-10-CM

## 2024-04-11 DIAGNOSIS — R06.09 DOE (DYSPNEA ON EXERTION): Primary | ICD-10-CM

## 2024-04-11 DIAGNOSIS — E78.5 DYSLIPIDEMIA: ICD-10-CM

## 2024-04-11 DIAGNOSIS — I50.33 ACUTE ON CHRONIC DIASTOLIC HEART FAILURE (MULTI): ICD-10-CM

## 2024-04-11 PROCEDURE — 99213 OFFICE O/P EST LOW 20 MIN: CPT

## 2024-04-11 PROCEDURE — 1157F ADVNC CARE PLAN IN RCRD: CPT | Performed by: STUDENT IN AN ORGANIZED HEALTH CARE EDUCATION/TRAINING PROGRAM

## 2024-04-11 PROCEDURE — 3078F DIAST BP <80 MM HG: CPT | Performed by: STUDENT IN AN ORGANIZED HEALTH CARE EDUCATION/TRAINING PROGRAM

## 2024-04-11 PROCEDURE — 1160F RVW MEDS BY RX/DR IN RCRD: CPT

## 2024-04-11 PROCEDURE — 1036F TOBACCO NON-USER: CPT

## 2024-04-11 PROCEDURE — 3008F BODY MASS INDEX DOCD: CPT | Performed by: STUDENT IN AN ORGANIZED HEALTH CARE EDUCATION/TRAINING PROGRAM

## 2024-04-11 PROCEDURE — 1157F ADVNC CARE PLAN IN RCRD: CPT

## 2024-04-11 PROCEDURE — 1160F RVW MEDS BY RX/DR IN RCRD: CPT | Performed by: STUDENT IN AN ORGANIZED HEALTH CARE EDUCATION/TRAINING PROGRAM

## 2024-04-11 PROCEDURE — 1159F MED LIST DOCD IN RCRD: CPT | Performed by: STUDENT IN AN ORGANIZED HEALTH CARE EDUCATION/TRAINING PROGRAM

## 2024-04-11 PROCEDURE — L4361 PNEUMA/VAC WALK BOOT PRE OTS: HCPCS

## 2024-04-11 PROCEDURE — 99215 OFFICE O/P EST HI 40 MIN: CPT | Performed by: STUDENT IN AN ORGANIZED HEALTH CARE EDUCATION/TRAINING PROGRAM

## 2024-04-11 PROCEDURE — 3074F SYST BP LT 130 MM HG: CPT | Performed by: STUDENT IN AN ORGANIZED HEALTH CARE EDUCATION/TRAINING PROGRAM

## 2024-04-11 PROCEDURE — 1159F MED LIST DOCD IN RCRD: CPT

## 2024-04-11 PROCEDURE — 3008F BODY MASS INDEX DOCD: CPT

## 2024-04-11 PROCEDURE — 1036F TOBACCO NON-USER: CPT | Performed by: STUDENT IN AN ORGANIZED HEALTH CARE EDUCATION/TRAINING PROGRAM

## 2024-04-11 ASSESSMENT — PAIN DESCRIPTION - DESCRIPTORS: DESCRIPTORS: SHARP;BURNING;THROBBING

## 2024-04-11 ASSESSMENT — PAIN - FUNCTIONAL ASSESSMENT: PAIN_FUNCTIONAL_ASSESSMENT: 0-10

## 2024-04-11 ASSESSMENT — PAIN SCALES - GENERAL: PAINLEVEL_OUTOF10: 5 - MODERATE PAIN

## 2024-04-11 NOTE — PROGRESS NOTES
PRIMARY CARE PHYSICIAN: Brittany Behm, DO  REFERRING PROVIDER: No referring provider defined for this encounter.     CONSULT ORTHOPAEDIC: Ankle Evaluation    ASSESSMENT & PLAN    Impression: 69 y.o. female with periostitis of the left distal tibia     Plan:   I explained to the patient the nature of their diagnosis.  I reviewed their imaging studies with them.    Based on the history, physical exam and imaging studies above, the patient's presentation is consistent with the above diagnosis.  I had a long discussion with the patient regarding their presentation and the treatment options.  A stat MRI order has already been placed by her primary care provider.  Patient will proceed to schedule this MRI.  Patient was placed in a tall cam walking boot today.  She may come out of her boot at home to ice and elevate the LLE and perform gentle ankle range of motion.  Patient requested something for the pain and I offered to send her an anti-inflammatory or muscle relaxant which she declined.  I advised patient to have close follow-up with foot and ankle specialist after MRI is completed.  Follow-up appointment was made for her today with foot and ankle specialist Dr. Kelly on 04/16 to review the MRI and discussed the treatment plan going forward.    Patient was prescribed a CAM walker boot for periostitis of LLE .The patient is ambulatory with or without aid; but, has weakness, instability and/or deformity of their left ankle/foot which requires stabilization from this orthosis to improve their function.      Verbal and written instructions for the use, wear schedule, cleaning and application of this item were given.  Patient was instructed that should the brace result in increased pain, decreased sensation, increased swelling, or an overall worsening of their medical condition, to please contact our office immediately.     Orthotic management and training was provided for skin care, modifications due to healing tissues,  edema changes, interruption in skin integrity, and safety precautions with the orthosis.      At the end of the visit, all questions were answered in full. The patient is in agreement with the plan and recommendations. They will call the office with any questions/concerns.    Note dictated with [employer] software. Completed without full typed error editing and sent to avoid delay.     SUBJECTIVE  CHIEF COMPLAINT:   Chief Complaint   Patient presents with    Left Lower Leg - Pain, Edema        HPI: Marissa Mosquera is a 69 y.o. patient who presents to the orthopedic walk-in clinic with left leg pain.  Pain began approximately 6 weeks ago.  No known injury.  Patient states her leg would swell from the distal tibia to ankle and become very painful making it difficult to stand and cook dinner. She states she does have a history of stress fractures in the lumbar spine.  She localizes the pain to the distal anterior tibia.  Pain is worse with plantar and dorsiflexion of the left foot.  Pain is mildly improved with elevation. She saw her primary care for this who ordered an x-ray images of the right ankle and tib-fib as well as an ultrasound to rule out DVT.  Ultrasound negative for DVT.  Her PCP also ordered a stat MRI which has not been completed yet.  She is here today for further evaluation as she fears that something is wrong such as a stress fracture of the left leg.    They deny any constant or progressive numbness or tingling in their legs.     REVIEW OF SYSTEMS  Constitutional: See HPI for pain assessment, No significant recent weight gain or loss, no fever or chills  Cardiovascular: No chest pain, shortness of breath  Respiratory: No difficulty breathing, no cough  Gastrointestinal: No nausea, vomiting, diarrhea, constipation  Musculoskeletal: Noted in HPI, positive for pain, restricted motion, stiffness  Integumentary: No rashes, easy bruising or skin lesions  Neurological: No headache, no  numbness or tingling in extremities  Psychiatric: No mood changes or memory changes. No social issues  Heme/Lymph: No excessive swelling, easy bruising or excessive bleeding  ENT: No hearing changes, no nosebleeds  Eyes: No vision changes    Past Medical History:   Diagnosis Date    Anesthesia of skin 04/20/2015    Finger numbness    Arthritis 2008    Cataract 2018    Chronic kidney disease 2023    Conjunctival cysts, left eye 08/14/2018    Conjunctival cyst of left eye    Disease of thyroid gland 1985    GERD (gastroesophageal reflux disease) 1976    Hypertension 2015    Hypertensive retinopathy, bilateral 08/20/2019    Hypertensive retinopathy of both eyes    Other conditions influencing health status 02/15/2017    Compression fracture    Peptic ulceration 2023    Personal history of diseases of the skin and subcutaneous tissue 11/01/2018    History of urticaria    Personal history of other diseases of the circulatory system 11/01/2018    History of hypertension    Personal history of other specified conditions 10/31/2018    History of diarrhea    Personal history of other specified conditions 07/27/2015    History of vertigo    Retinal hemorrhage, left eye 02/13/2018    Retinal hemorrhage of left eye    Varicella 1957    Visual impairment 1963        No Known Allergies     Past Surgical History:   Procedure Laterality Date    BREAST SURGERY  2007    COLONOSCOPY  06/08/2016    Complete Colonoscopy    CT ANGIO NECK  06/28/2018    CT NECK ANGIO W AND WO IV CONTRAST 6/28/2018 [facility]    CT ANGIO NECK  08/29/2022    CT NECK ANGIO W AND WO IV CONTRAST 8/29/2022 [facility] EMERGENCY LEGACY    CT HEAD ANGIO W AND WO IV CONTRAST  06/28/2018    CT HEAD ANGIO W AND WO IV CONTRAST 6/28/2018 [facility]GAC[facility]    CT HEAD ANGIO W AND WO IV CONTRAST  08/29/2022    CT HEAD ANGIO W AND WO IV CONTRAST 8/29/2022 [facility] EMERGENCY LEGAC[facility]    FRACTURE SURGERY  1980    GASTRIC FUNDOPLICATION  06/29/2017    Esophagogastric Fundoplasty Nissen  Fundoplication    JOINT REPLACEMENT  2011, 2021,2022    MR HEAD ANGIO WO IV CONTRAST  09/29/2021    MR HEAD ANGIO WO IV CONTRAST 9/29/2021 [facility]    OTHER SURGICAL HISTORY  07/17/2017    Esophagogastric Fundoplasty Laparoscopic For Paraesophageal Hernia Repair    OTHER SURGICAL HISTORY  07/17/2017    Gastroplasty Longitudinal    OTHER SURGICAL HISTORY  07/17/2017    Esophageal Lengthening    OTHER SURGICAL HISTORY  07/17/2017    Repair Of Paraesophageal Hiatus Hernia    OTHER SURGICAL HISTORY  12/18/2017    Laparoscopy Repair Of Hernia    OTHER SURGICAL HISTORY  03/05/2019    Shoulder replacement    OTHER SURGICAL HISTORY  04/22/2015    Treatment Of Wrist Fracture    OTHER SURGICAL HISTORY  04/22/2015    Open Treatment Of Clavicular Fracture    OTHER SURGICAL HISTORY  04/22/2015    Breast Surgery Excision Of Breast Single Lesion    OTHER SURGICAL HISTORY  04/22/2015    Parathyroid Complete Parathyroidectomy    SMALL INTESTINE SURGERY  11/2016    SPINE SURGERY  2019    TOTAL KNEE ARTHROPLASTY  04/22/2015    Knee Replacement        Family History   Problem Relation Name Age of Onset    Heart failure Mother Elisabet     Hypertension Mother Elisabet     Arthritis Mother Elisabet     Depression Mother Elisabet     Early natural death Mother Elisabet     Stroke Mother Elisabet     Heart failure Father Vernon     Heart disease Father Vernon     Other (Sinus problem) Brother      Cancer Other Grandfather     Cancer Maternal Grandfather Jose     Early natural death Maternal Grandfather Jose     Stroke Maternal Grandfather Jose     Alcohol abuse Brother Mario     Diabetes Brother Mario     Hypertension Brother Mario     Alcohol abuse Brother Junior     Drug abuse Brother Junior     Stroke Mother's Sister Katharine         Social History     Socioeconomic History    Marital status:      Spouse name: Not on file    Number of children: Not on file    Years of education: Not on file    Highest education level: Not on file    Occupational History    Not on file   Tobacco Use    Smoking status: Never    Smokeless tobacco: Never   Substance and Sexual Activity    Alcohol use: Never    Drug use: Never    Sexual activity: Not Currently     Partners: Male   Other Topics Concern    Not on file   Social History Narrative    Not on file     Social Determinants of Health     Financial Resource Strain: Not on file   Food Insecurity: No Food Insecurity (12/5/2023)    Hunger Vital Sign     Worried About Running Out of Food in the Last Year: Never true     Ran Out of Food in the Last Year: Never true   Transportation Needs: Not on file   Physical Activity: Not on file   Stress: Not on file   Social Connections: Not on file   Intimate Partner Violence: Not on file   Housing Stability: Not on file        CURRENT MEDICATIONS:   Current Outpatient Medications   Medication Sig Dispense Refill    amLODIPine (Norvasc) 10 mg tablet Take 1 tablet (10 mg) by mouth once daily.      atorvastatin (Lipitor) 10 mg tablet TAKE 1 TABLET BY MOUTH EVERY DAY 90 tablet 3    calcium carbonate (Tums) 200 mg calcium chewable tablet Chew.      empagliflozin (Jardiance) 10 mg Take 1 tablet (10 mg) by mouth once daily. TAKE ONE TABLET BY MOUTH ONCE DAILY IN THE MORNING 90 tablet 3    HYDROcodone-acetaminophen (Norco) 5-325 mg tablet Take 1 tablet by mouth every 6 hours if needed for severe pain (7 - 10) for up to 14 days. 56 tablet 0    levothyroxine (Synthroid, Levoxyl) 75 mcg tablet TAKE 1 TABLET BY MOUTH EVERY DAY 90 tablet 3    omeprazole (PriLOSEC) 40 mg DR capsule Take 1 capsule (40 mg) by mouth once daily in the morning. Take before meals.      ondansetron (Zofran) 4 mg tablet Take by mouth.       No current facility-administered medications for this visit.        OBJECTIVE    PHYSICAL EXAM      11/29/2023    12:46 PM 12/5/2023     2:57 PM 2/7/2024    12:50 PM 3/13/2024     3:43 PM 4/9/2024    12:33 PM 4/11/2024     9:10 AM 4/11/2024    11:40 AM   Vitals   Systolic  "146 138 143 136 152 100    Diastolic 80 79 83 79 100 60    Heart Rate 67 74 74 84 82 77    Temp 36 °C (96.8 °F) 36 °C (96.8 °F) 36.7 °C (98 °F) 36.6 °C (97.9 °F) 36.8 °C (98.2 °F)     Resp 16 20   18     Height (in) 1.524 m (5')   1.549 m (5' 1\") 1.549 m (5' 1\") 1.549 m (5' 1\") 1.549 m (5' 1\")   Weight (lb) 189.6 192.2 193 190 193 193 192   BMI 37.03 kg/m2 37.54 kg/m2 37.69 kg/m2 35.9 kg/m2 36.47 kg/m2 36.47 kg/m2 36.28 kg/m2   BSA (m2) 1.91 m2 1.92 m2 1.92 m2 1.93 m2 1.94 m2 1.94 m2 1.94 m2   Visit Report Report Report Report Report Report Report    Report Report    Report      Body mass index is 36.28 kg/m².    GENERAL: A/Ox3, NAD. Appears healthy, well nourished  PSYCHIATRIC: Mood stable, appropriate memory recall  EYES: EOM intact, no scleral icterus  CARDIOVASCULAR: Palpable peripheral pulses  LUNGS: Breathing non-labored on room air  SKIN: No open lesions, rashes, ulcerations     MUSCULOSKELETAL:  Laterality: left Ankle Exam  - Skin intact  - No erythema or warmth  - No obvious ecchymosis or soft tissue swelling  - Alignment: neutral  - Palpation: Tenderness to palpation over the anterior aspect of the lateral compartment of the distal tibia. No tenderness to palpation over the Achilles, medial and lateral malleolus, posterior tibial tendon, peroneal tendon, ATFL, deltoid ligament, talus or navicular. No tenderness to palpation over heel, plantar arch, base of 1st metatarsal/2nd metatarsal, 5th metatarsal, medial cuneiform, navicular, 1st MTP joint or 2nd or 3rd intermetarsal space.  - ROM:  Normal ROM in ankle plantar flexion, dorsiflexion however painful. Normal ROM inversion and eversion; normal ROM in 2-5th toe flexion/extension and 1st MTP flexion/extension  - Effusion: none  - Strength: Normal strength in ankle plantar flexion, dorsiflexion, inversion and eversion but painful; normal strength with great toe flexion/extension  - Stability:        Anterior drawer stable       Inversion/Eversion stable  - " Achilles tendon palpable and intact; De La Cruz test negative  - Gait: antalgic  - Special Tests: compartments soft and compressible, negative chele    NEUROVASCULAR:  - Neurovascular Status: sensation intact to light touch distally, lower extremity motor intact  - Capillary refill brisk at extremities, Bilateral dorsalis pedis pulse 2+    Imaging: Multiple views of the affected left ankle(s) and tibia fibula demonstrate: no acute osseous abnormality, no fracture, no dislocation.   X-rays were personally reviewed and interpreted by me.  Radiology reports were reviewed by me as well, if readily available at the time.

## 2024-04-11 NOTE — PATIENT INSTRUCTIONS
Please obtain knee or thigh high compression stockings that are 20-30 mmHg compression.  For compression socks, you can go to [employer], or any other store that sells durable medical goods.  Call in advance and find out when the stocking fitter is going to be there. Plan to go fairly early in the day. If you go later in the day, your legs may be swollen and you will not get a properly fit stocking. Put your compression socks on first thing in the morning and wear them until you are done for the day.    We will obtain a transthoracic echocardiogram for structural evaluation including ejection fraction, assessment of regional wall motion abnormalities or valvular disease, and further evaluation of hemodynamics.    We also obtain renal function panel and BNP before your follow-up visit.    Please continue current cardiac medication including amlodipine 10 mg daily, atorvastatin 10 mg daily, Jardiance 10 mg daily.    Please followup with me in Cardiology clinic within the next 5 to 6 months.  Please return to clinic sooner or seek emergent care if your symptoms reoccur or worsen.

## 2024-04-11 NOTE — PROGRESS NOTES
"Follow-up (7-8 mo fuv-increased amlodipine last visit/ER [id number] for POTS)     HPI:    Marissa Mosquera \"Harman or Ms Mosquera\" is a 69 y.o. female with pertinent history of premature coronary artery disease and stroke, allergic rhinitis, status post bariatric surgery, hypertension, cervical myelopathy and radiculopathy, gout, hypertension, vitamin D deficiency, no significant arrhythmias on event monitor performed June 2018 in September 2022, no obstructive carotid disease on Dopplers performed 10/5/2022, preserved ejection fraction mild to moderate left atrial enlargement echo performed 10/5/2022, CT calcium score of 1 performed 11/4/2022, likely component of acute on chronic diastolic heart failure with BNP elevation of 311 8/29/2022 presents to cardiology clinic for follow-up.    She is doing relatively well but does have a series of complaints and concerns.  She does note some sporadic increased lower extremity edema.  Is worse toward the end of the day.  She also notes some daytime somnolence.  She also had questions about whether she would benefit from diuretic therapy which was discussed with her nephrologist.  I did note that she did have a significantly elevated BNP level in the past.  We discussed the natural history of heart failure.  Her blood pressure is a bit low today but does seem to fluctuate significantly and normally is in a relatively stable range.  We also discussed autonomic dysfunction she does have some dizziness that sounds more orthostatic in nature.  No exacerbating or relieving factors.  Patient denies chest pain and angina.  Pt denies orthopnea, and paroxysmal nocturnal dyspnea.  Pt denies worsening lower extremity edema.  Pt denies syncope.  No recent falls.  No fever or chills.  No cough.  No change in bowel or bladder habits.  No sick contacts.  No recent travel.    12 point review of systems including (Constitutional, Eyes, ENMT, Respiratory, Cardiac, " Gastrointestinal, Neurological, Psychiatric, and Hematologic) was performed and is otherwise negative.    Past medical history reviewed:   has a past medical history of Anesthesia of skin (04/20/2015), Arthritis (2008), Cataract (2018), Chronic kidney disease (2023), Conjunctival cysts, left eye (08/14/2018), Disease of thyroid gland (1985), GERD (gastroesophageal reflux disease) (1976), Hypertension (2015), Hypertensive retinopathy, bilateral (08/20/2019), Other conditions influencing health status (02/15/2017), Peptic ulceration (2023), Personal history of diseases of the skin and subcutaneous tissue (11/01/2018), Personal history of other diseases of the circulatory system (11/01/2018), Personal history of other specified conditions (10/31/2018), Personal history of other specified conditions (07/27/2015), Retinal hemorrhage, left eye (02/13/2018), Varicella (1957), and Visual impairment (1963).    Past surgical history reviewed:   has a past surgical history that includes Other surgical history (07/17/2017); Other surgical history (07/17/2017); Other surgical history (07/17/2017); Other surgical history (07/17/2017); Other surgical history (12/18/2017); Other surgical history (03/05/2019); Gastric fundoplication (06/29/2017); Colonoscopy (06/08/2016); Other surgical history (04/22/2015); Other surgical history (04/22/2015); Total knee arthroplasty (04/22/2015); Other surgical history (04/22/2015); Other surgical history (04/22/2015); CT angio head w and wo IV contrast (06/28/2018); CT angio neck (06/28/2018); MR angio head wo IV contrast (09/29/2021); CT angio head w and wo IV contrast (08/29/2022); CT angio neck (08/29/2022); Breast surgery (2007); Fracture surgery (1980); Joint replacement (2011, 2021,2022); Spine surgery (2019); and Small intestine surgery (11/2016).    Social history reviewed:   reports that she has never smoked. She has never used smokeless tobacco. She reports that she does not drink  alcohol and does not use drugs.     Family history reviewed:    Family History   Problem Relation Name Age of Onset    Heart failure Mother Elisabet     Hypertension Mother Elisabet     Arthritis Mother Elisabet     Depression Mother Elisabet     Early natural death Mother Elisabet     Stroke Mother Elisabet     Heart failure Father Vernon     Heart disease Father Vernon     Other (Sinus problem) Brother      Cancer Other Grandfather     Cancer Maternal Grandfather Jose     Early natural death Maternal Grandfather Jose     Stroke Maternal Grandfather Jose     Alcohol abuse Brother Mario     Diabetes Brother Mario     Hypertension Brother Mario     Alcohol abuse Brother Junior     Drug abuse Brother Junior     Stroke Mother's Sister Katharine        Allergies reviewed: Patient has no known allergies.     Medications reviewed:   Current Outpatient Medications   Medication Instructions    amLODIPine (NORVASC) 10 mg, oral, Daily    atorvastatin (LIPITOR) 10 mg, oral, Daily    calcium carbonate (Tums) 200 mg calcium chewable tablet oral    empagliflozin (Jardiance) 10 mg Take 1 tablet (10 mg) by mouth once daily. TAKE ONE TABLET BY MOUTH ONCE DAILY IN THE MORNING    levothyroxine (SYNTHROID, LEVOXYL) 75 mcg, oral, Daily    omeprazole (PRILOSEC) 40 mg, oral, Daily before breakfast    ondansetron (Zofran) 4 mg tablet oral        Vitals reviewed: Visit Vitals  /60   Pulse 77       Physical Exam:   General:  Patient is awake, alert, and oriented.  Patient is in no acute distress.  HEENT:  Pupils equal and reactive.  Normocephalic.  Moist mucosa.    Neck:  No thyromegaly.  Normal Jugular Venous Pressure.  Cardiovascular:  Regular rate and rhythm.  Normal S1 and S2.  1/6 DORIS.  Pulmonary:  Clear to auscultation bilaterally.  Abdomen:  Soft. Non-tender.   Non-distended.  Positive bowel sounds.  Lower Extremities:  2+ pedal pulses.  Trace to 1+ LE edema.  Neurologic:  Cranial nerves intact.  No focal deficit.   Skin: Skin warm and dry, normal  "skin turgor.   Psychiatric: Normal affect.    Last Labs:  CBC -      Lab Results   Component Value Date    WBC 6.5 03/18/2024    HGB 14.9 03/18/2024    HCT 47.5 (H) 03/18/2024     03/18/2024        CMP-  Lab Results   Component Value Date    GLUCOSE 85 03/18/2024     03/18/2024    K 4.7 03/18/2024     03/18/2024    CO2 26 03/18/2024    ANIONGAP 15 03/18/2024    BUN 20 03/18/2024    CREATININE 1.22 (H) 03/18/2024    EGFR 48 (L) 03/18/2024    CALCIUM 9.6 03/18/2024    PHOS 4.2 09/19/2023    PROT 6.7 03/18/2024    ALBUMIN 4.5 03/18/2024    AST 17 03/18/2024    ALT 15 03/18/2024    ALKPHOS 103 03/18/2024    BILITOT 0.5 03/18/2024        LIPIDS-  Lab Results   Component Value Date    CHOL 190 09/19/2023    TRIG 72 09/19/2023    HDL 84.6 09/19/2023    CHHDL 2.2 09/19/2023    VLDL 14 09/19/2023        OTHERS-  Lab Results   Component Value Date    HGBA1C 5.7 (H) 03/18/2024     (H) 08/29/2022        I personally reviewed the patient's recent vitals, labs, medications, orders, EKGs, pertinent cardiac imaging/ echocardiography and ischemic evaluations including stress testing/ cardiac catheterization.    Assessment and Plan:    Marissa Mosquera \"Harman or Ms Mosquera\" is a 69 y.o. female with pertinent history of premature coronary artery disease and stroke, allergic rhinitis, status post bariatric surgery, hypertension, cervical myelopathy and radiculopathy, gout, hypertension, vitamin D deficiency, no significant arrhythmias on event monitor performed June 2018 in September 2022, no obstructive carotid disease on Dopplers performed 10/5/2022, preserved ejection fraction mild to moderate left atrial enlargement echo performed 10/5/2022, CT calcium score of 1 performed 11/4/2022, likely component of acute on chronic diastolic heart failure with BNP elevation of 311 8/29/2022 presents to cardiology clinic for follow-up.  She is doing relatively well but does have a series of complaints and concerns.  She " does note some sporadic increased lower extremity edema.  Is worse toward the end of the day.  She also notes some daytime somnolence.  She also had questions about whether she would benefit from diuretic therapy which was discussed with her nephrologist.  I did note that she did have a significantly elevated BNP level in the past.  We discussed the natural history of heart failure.  Her blood pressure is a bit low today but does seem to fluctuate significantly and normally is in a relatively stable range.  We also discussed autonomic dysfunction she does have some dizziness that sounds more orthostatic in nature.      Please obtain knee or thigh high compression stockings that are 20-30 mmHg compression.  For compression socks, you can go to [employer], or any other store that sells durable medical goods.  Call in advance and find out when the stocking fitter is going to be there. Plan to go fairly early in the day. If you go later in the day, your legs may be swollen and you will not get a properly fit stocking. Put your compression socks on first thing in the morning and wear them until you are done for the day.    We will obtain a transthoracic echocardiogram for structural evaluation including ejection fraction, assessment of regional wall motion abnormalities or valvular disease, and further evaluation of hemodynamics.    We also obtain renal function panel and BNP before your follow-up visit.    Please continue current cardiac medication including amlodipine 10 mg daily, atorvastatin 10 mg daily, Jardiance 10 mg daily.    Please followup with me in Cardiology clinic within the next 5 to 6 months.  Please return to clinic sooner or seek emergent care if your symptoms reoccur or worsen.    Thank you for allowing me to participate in their care.  Please feel free to call me with any further questions or concerns.        Jose Randhawa MD, FACC, ALEC ALCOCER  Division of Cardiovascular Medicine  System  Director, Nuclear Cardiology   Medical Director, [facility] [place], [facility]   Clinical , [employer]  [email or web address]   Office:  [phone]

## 2024-04-13 ENCOUNTER — HOSPITAL ENCOUNTER (OUTPATIENT)
Dept: RADIOLOGY | Facility: HOSPITAL | Age: 69
Discharge: HOME | End: 2024-04-13
Payer: MEDICARE

## 2024-04-13 ENCOUNTER — APPOINTMENT (OUTPATIENT)
Dept: RADIOLOGY | Facility: HOSPITAL | Age: 69
End: 2024-04-13
Payer: MEDICARE

## 2024-04-13 DIAGNOSIS — T14.8XXA AVULSION FRACTURE: ICD-10-CM

## 2024-04-13 PROCEDURE — 73721 MRI JNT OF LWR EXTRE W/O DYE: CPT | Mod: LT

## 2024-04-13 PROCEDURE — 73721 MRI JNT OF LWR EXTRE W/O DYE: CPT | Mod: LEFT SIDE | Performed by: RADIOLOGY

## 2024-04-15 ASSESSMENT — SLIT LAMP EXAM - LIDS
COMMENTS: GOOD POSITION
COMMENTS: GOOD POSITION

## 2024-04-15 ASSESSMENT — CUP TO DISC RATIO
OS_RATIO: .1
OD_RATIO: .1

## 2024-04-15 ASSESSMENT — EXTERNAL EXAM - RIGHT EYE: OD_EXAM: NORMAL

## 2024-04-15 ASSESSMENT — EXTERNAL EXAM - LEFT EYE: OS_EXAM: NORMAL

## 2024-04-15 NOTE — PROGRESS NOTES
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 No edema OU. Small to medium drusen OU. Stable from 9/16/22.   -In 2021 diagnosed with gastritis with late stage dumping and also gastroparesis. Patient will consult with her medical team to see if can take lutein supplement. Cannot eat raw vegetables.  -F/u 4-6 months for cataract evaluation -- recheck refraction, glare/BAT, dilation. Plan for Lenstar/Pentacam/OCT macula only if cataract(s) visually significant and if patient would like to proceed with cataract surgery.    Combined form of age-related cataract, right eyeH25.811  Combined form of age-related cataract, left eyeH25.812  -Patient is noticing worsening of vision. Symptoms: Gradual worsening over the past several months. Having more trouble seeing road signs when driving. Glare from bright sun. Avoids driving at night when possible.    -Lenstar done at previous visit - will need to repeat when ready for cataract surgery.   -Dominance: Right  -F/u 4-6 months for cataract evaluation -- recheck refraction, glare/BAT, dilation. Plan for Lenstar/Pentacam/OCT macula only if cataract(s) visually significant and if patient would like to proceed with cataract surgery.    Bilateral dry eyesH04.123  -Warm compresses and artificial tears PRN    Hypertensive retinopathy of both eyesH35.033  Retinal hemorrhage, left eyeH35.62   -History of retinal hemorrhages OS in 2016 - resolved with no recurrence since then.   -No history of diabetes or high cholesterol. Has HTN - BP has been elevated lately. Continue close monitoring of blood pressure.   -Had CTA neck and ECHO in June 2018 - WNL.  -Seen by Dr. Garces August 2018. Monitor.     Nevus of iris of right eyeD31.41  Choroidal nevus of left eyeD31.32  -Small risk of melanoma with these type of lesions. Discussed risk of glaucoma/choroidal  "melanoma. Plan for dilated exam at least annually to monitor.     H52.10 Myopia with presbyopia  -Rx given (3/5/19). Did not fill, but happy with OTC reading glasses at this time (+1.25)        PRIOR OCULAR HISTORY:    Periorbital edema of right eyeR60.0  -Had 3 episodes of right periorbital edema preceded by irritation/FBS in 2018. All episodes resolved within about 24 hours. Possibly related to bacterial overgrowth in bowel, was hospitalized and treated and symptoms have resolved. No periorbital edema since 2018.     H/O ksnalzhwR04.69  -Happened a couple of years ago. Per patient, does not think it was truly diplopia.  Patient feels this was associated with an episode of intraocular bleeding in 2016.  No episodes since last visit. Monitor.    [id number]  -Headaches in 2020 - about 3 times a week. Symptoms have decreased over time, but had episode last week of \"film\" over eyes lasting about 36 hours, then resolved. Also concurrently had numbness/tingling of 4th/5th digits of left hand - took 62 hours to resolve. Last MRI brain w/wo contrast was 2016 and was found to have left parietal arachnoid cyst. Also FH of aneurysm - will discuss further management with imaging and/or anticoagulation with PMD. Per patient she bruises easily though not on anticoagulant.  "

## 2024-04-16 ENCOUNTER — OFFICE VISIT (OUTPATIENT)
Dept: OPHTHALMOLOGY | Facility: CLINIC | Age: 69
End: 2024-04-16
Payer: MEDICARE

## 2024-04-16 ENCOUNTER — TELEPHONE (OUTPATIENT)
Dept: PRIMARY CARE | Facility: CLINIC | Age: 69
End: 2024-04-16

## 2024-04-16 ENCOUNTER — OFFICE VISIT (OUTPATIENT)
Dept: ORTHOPEDIC SURGERY | Facility: HOSPITAL | Age: 69
End: 2024-04-16
Payer: MEDICARE

## 2024-04-16 DIAGNOSIS — H52.4 PRESBYOPIA: ICD-10-CM

## 2024-04-16 DIAGNOSIS — H35.3131 EARLY DRY STAGE NONEXUDATIVE AGE-RELATED MACULAR DEGENERATION OF BOTH EYES: Primary | ICD-10-CM

## 2024-04-16 DIAGNOSIS — D31.32 CHOROIDAL NEVUS OF LEFT EYE: ICD-10-CM

## 2024-04-16 DIAGNOSIS — H52.12 MYOPIA OF LEFT EYE: ICD-10-CM

## 2024-04-16 DIAGNOSIS — H25.811 COMBINED FORM OF AGE-RELATED CATARACT, RIGHT EYE: ICD-10-CM

## 2024-04-16 DIAGNOSIS — H04.123 DRY EYES, BILATERAL: ICD-10-CM

## 2024-04-16 DIAGNOSIS — D31.41 NEVUS OF IRIS OF RIGHT EYE: ICD-10-CM

## 2024-04-16 DIAGNOSIS — M19.072 OSTEOARTHRITIS OF LEFT MIDFOOT: ICD-10-CM

## 2024-04-16 DIAGNOSIS — H35.033 HYPERTENSIVE RETINOPATHY OF BOTH EYES: ICD-10-CM

## 2024-04-16 DIAGNOSIS — M19.072 PRIMARY OSTEOARTHRITIS OF LEFT ANKLE: Primary | ICD-10-CM

## 2024-04-16 DIAGNOSIS — H25.812 COMBINED FORM OF AGE-RELATED CATARACT, LEFT EYE: ICD-10-CM

## 2024-04-16 PROCEDURE — 99213 OFFICE O/P EST LOW 20 MIN: CPT | Performed by: STUDENT IN AN ORGANIZED HEALTH CARE EDUCATION/TRAINING PROGRAM

## 2024-04-16 PROCEDURE — 1160F RVW MEDS BY RX/DR IN RCRD: CPT | Performed by: STUDENT IN AN ORGANIZED HEALTH CARE EDUCATION/TRAINING PROGRAM

## 2024-04-16 PROCEDURE — 1157F ADVNC CARE PLAN IN RCRD: CPT | Performed by: STUDENT IN AN ORGANIZED HEALTH CARE EDUCATION/TRAINING PROGRAM

## 2024-04-16 PROCEDURE — 99213 OFFICE O/P EST LOW 20 MIN: CPT | Performed by: OPHTHALMOLOGY

## 2024-04-16 PROCEDURE — 3008F BODY MASS INDEX DOCD: CPT | Performed by: STUDENT IN AN ORGANIZED HEALTH CARE EDUCATION/TRAINING PROGRAM

## 2024-04-16 PROCEDURE — 92134 CPTRZ OPH DX IMG PST SGM RTA: CPT | Performed by: OPHTHALMOLOGY

## 2024-04-16 PROCEDURE — 1036F TOBACCO NON-USER: CPT | Performed by: STUDENT IN AN ORGANIZED HEALTH CARE EDUCATION/TRAINING PROGRAM

## 2024-04-16 PROCEDURE — 1125F AMNT PAIN NOTED PAIN PRSNT: CPT | Performed by: STUDENT IN AN ORGANIZED HEALTH CARE EDUCATION/TRAINING PROGRAM

## 2024-04-16 PROCEDURE — 1159F MED LIST DOCD IN RCRD: CPT | Performed by: STUDENT IN AN ORGANIZED HEALTH CARE EDUCATION/TRAINING PROGRAM

## 2024-04-16 ASSESSMENT — ENCOUNTER SYMPTOMS
ALLERGIC/IMMUNOLOGIC NEGATIVE: 0
EYES NEGATIVE: 1
CONSTITUTIONAL NEGATIVE: 0
ENDOCRINE NEGATIVE: 0
MUSCULOSKELETAL NEGATIVE: 0
CARDIOVASCULAR NEGATIVE: 0
RESPIRATORY NEGATIVE: 0
HEMATOLOGIC/LYMPHATIC NEGATIVE: 0
GASTROINTESTINAL NEGATIVE: 0
NEUROLOGICAL NEGATIVE: 0
PSYCHIATRIC NEGATIVE: 0

## 2024-04-16 ASSESSMENT — PAIN DESCRIPTION - DESCRIPTORS: DESCRIPTORS: ACHING;SHOOTING

## 2024-04-16 ASSESSMENT — TONOMETRY
IOP_METHOD: GOLDMANN APPLANATION
OS_IOP_MMHG: 12
OD_IOP_MMHG: 10

## 2024-04-16 ASSESSMENT — VISUAL ACUITY
METHOD: SNELLEN - LINEAR
OS_SC: 20/70
OD_SC: 20/25
OS_PH_SC: 20/40

## 2024-04-16 ASSESSMENT — REFRACTION_WEARINGRX: SPECS_TYPE: OTC

## 2024-04-16 ASSESSMENT — PAIN - FUNCTIONAL ASSESSMENT: PAIN_FUNCTIONAL_ASSESSMENT: 0-10

## 2024-04-16 ASSESSMENT — PAIN SCALES - GENERAL: PAINLEVEL_OUTOF10: 6

## 2024-04-16 NOTE — PATIENT INSTRUCTIONS
INSTRUCTIONS:  Test each eye separately. Cover the eye you are not using  Keep the grid at a normal reading distance - about 14 inches away  Look directly at the dot in the center of the grid  If you normally wear glasses, do so while looking at the grid    If you notice blurry or wavy lines, and dark or blank spots, please call your Ophthalmologist as soon as possible.             [phone].

## 2024-04-16 NOTE — LETTER
"April 16, 2024     Brittany Behm, DO  [address] [facility], [address]  [place] [address]    Patient: Harman or Ms Eveline Mosquera   YOB: 1955   Date of Visit: 4/16/2024       Dear Dr. Brittany Behm, DO:    Thank you for referring Harman or Ms Eveline Mosquera to me for evaluation. Below are my notes for this consultation.  If you have questions, please do not hesitate to call me. I look forward to following your patient along with you.       Sincerely,     Bhupendra Kelly MD      CC: No Recipients  ______________________________________________________________________________________    ORTHOPAEDIC SURGERY HISTORY & PHYSICAL     Chief Complaint:  Left ankle pain and swelling    History Of Present Illness  Marissa Mosquera \"Harman or Ms Mosquera\" is a 69 y.o. female who presents for evaluation of left ankle pain and swelling.  This has been ongoing for several months, she reports that this all began with a injury sustained from February.  Pain is typically described as 6 out of 10.  She has had swelling which typically onsets approximately 1 hour into standing.  She has had a recent duplex ultrasound which was negative for clot.  She describes the development of 3+ pitting edema which worsens with walking and standing.  This is also associated with a burning pain that radiates up her leg.  She is also having difficulty when going up and down stairs.     Past Medical History  Past Medical History:   Diagnosis Date   • Anesthesia of skin 04/20/2015    Finger numbness   • Arthritis 2008   • Cataract 2018   • Chronic kidney disease 2023   • Conjunctival cysts, left eye 08/14/2018    Conjunctival cyst of left eye   • Disease of thyroid gland 1985   • GERD (gastroesophageal reflux disease) 1976   • Hypertension 2015   • Hypertensive retinopathy, bilateral 08/20/2019    Hypertensive retinopathy of both eyes   • Other conditions influencing health status 02/15/2017    Compression " fracture   • Peptic ulceration 2023   • Personal history of diseases of the skin and subcutaneous tissue 11/01/2018    History of urticaria   • Personal history of other diseases of the circulatory system 11/01/2018    History of hypertension   • Personal history of other specified conditions 10/31/2018    History of diarrhea   • Personal history of other specified conditions 07/27/2015    History of vertigo   • Retinal hemorrhage, left eye 02/13/2018    Retinal hemorrhage of left eye   • Varicella 1957   • Visual impairment 1963       Surgical History  Recent Surgeries in Orthopaedic Surgery            No cases to display             Social History  Social History     Socioeconomic History   • Marital status:      Spouse name: Not on file   • Number of children: Not on file   • Years of education: Not on file   • Highest education level: Not on file   Occupational History   • Not on file   Tobacco Use   • Smoking status: Never   • Smokeless tobacco: Never   Substance and Sexual Activity   • Alcohol use: Never   • Drug use: Never   • Sexual activity: Not Currently     Partners: Male   Other Topics Concern   • Not on file   Social History Narrative   • Not on file     Social Determinants of Health     Financial Resource Strain: Not on file   Food Insecurity: No Food Insecurity (12/5/2023)    Hunger Vital Sign    • Worried About Running Out of Food in the Last Year: Never true    • Ran Out of Food in the Last Year: Never true   Transportation Needs: Not on file   Physical Activity: Not on file   Stress: Not on file   Social Connections: Not on file   Intimate Partner Violence: Not on file   Housing Stability: Not on file       Family History  Family History   Problem Relation Name Age of Onset   • Heart failure Mother Elisabet    • Hypertension Mother Elisabet    • Arthritis Mother Elisabet    • Depression Mother Elisabet    • Early natural death Mother Elisabet    • Stroke Mother Elisabet    • Heart failure Father Vernon    •  Heart disease Father Vernon    • Other (Sinus problem) Brother     • Cancer Other Grandfather    • Cancer Maternal Grandfather Jose    • Early natural death Maternal Grandfather Jose    • Stroke Maternal Grandfather Jose    • Alcohol abuse Brother Mario    • Diabetes Brother Mario    • Hypertension Brother Mario    • Alcohol abuse Brother Junior    • Drug abuse Brother Junior    • Stroke Mother's Sister Katahrine         Allergies  No Known Allergies    Review of Systems  REVIEW OF SYSTEMS  Constitutional: no unplanned weight loss  Psychiatric: no suicidal ideation  ENT: no vision changes, no sinus problems  Pulmonary: no shortness of breath  Lymphatic: no enlarged lymph nodes  Cardiovascular: no chest pain or shortness of breath  Gastrointestinal: no stomach problems  Genitourinary: no dysuria   Skin: no rashes  Endocrine: no thyroid problems  Neurological: no headache, no numbness  Hematological: no easy bruising  Musculoskeletal: Left ankle pain    Physical Exam  PHYSICAL EXAMINATION  Constitutional Exam: well developed and well nourished  Psychiatric Exam: alert and oriented, appropriate mood and behavior  Eye Exam: EOMI  Pulmonary Exam: breathing non-labored, no apparent distress  Lymphatic exam: no appreciable lymphadenopathy in the lower extremities  Cardiovascular exam: RRR to peripheral palpation, DP pulses 2+, PT 2+, toes are pink with good capillary refill, no pitting edema  Skin exam: no open lesions, rashes, abrasions or ulcerations  Neurological exam: sensation to light touch intact in both lower extremities in peripheral and dermatomal distributions (except for any abnormalities noted in musculoskeletal exam)    Musculoskeletal exam: Left lower extremity examination.  Patient pain localized diffusely about the distal leg and about the ankle.  She has no obvious ankle effusion.  She is somewhat tender to palpation overlying the anterior tibiotalar joint, nontender to palpation at the sinus Tarsi.   Patient is nontender to palpation at the medial malleolus or distal fibula.  No obvious tenderness to palpation at the PTT and no obvious bogginess to the tendon.  Patient does have mild tenderness to palpation overlying the dorsum of the midfoot, no exquisite tenderness to palpation at the navicular.  She has a negative Tinel's at the posterior tarsal tunnel.  Patient has sensation intact to light touch grossly in a saphenous, sural, superficial peroneal, deep peroneal and tibial nerve distribution.  Patient has 5 out of 5 strength in plantarflexion, dorsiflexion and EHL. Patient has 2+ DP/PT pulse palpated.    Last Recorded Vitals  There were no vitals taken for this visit.    Laboratory Results    No results found for this or any previous visit (from the past 24 hour(s)).    Radiology Results   X-ray imaging left ankle as well as MRI left ankle reviewed and independently evaluated by me on 04/16/2024 and independently evaluated by me demonstrates no acute fracture or dislocation.  There is increased anterior distal tibial osteophytes and apparent corresponding changes of the dorsal talar neck, likely consistent with anterior ankle impingement versus osteoarthritis.  There are subtle well-corticated fragments just distal to the fibula, likely sequela of prior trauma.  MRI findings consistent with TT OA  There is suspected degenerative changes of the midfoot including second and third TMTJ and increased marrow signal in the navicular on T2 images without corresponding changes on T1.  Nonweightbearing imaging somewhat restricts interpretation of previous imaging.    Assessment/Plan:  69-year-old female who in my impression has left ankle pain and swelling likely at least partially attributable to left tibiotalar osteoarthritis and midfoot osteoarthritis.  I have reviewed the diagnosis and treatment options extensively with the patient. In my impression, the patient may continue weightbearing as tolerated in her left  lower extremity.  I will provide her with information regarding a lace up style ankle brace and certainly the patient may consider [place] style bracing for her ankle in the future.  I reviewed the therapeutic and diagnostic benefits of a left tibiotalar CSI, patient would defer at this time.  I discussed that I am unable to explain her significant 3+ pitting edema with prolonged weightbearing but did discuss the possibility of referral to vascular medicine in the future.  I will plan to see the patient back on an as-needed basis.  I encouraged the patient to contact the office if she develops any new pain, worsening symptoms or he has any further questions.  Upon return, patient would require 3 view weightbearing left foot and ankle.    Bhupendra Kelly MD, ANNE-MARIE  Department of Orthopaedic Surgery  [facility]    The diagnosis and treatment plan were reviewed with the patient. All questions were answered. The patient verbalized understanding of the treatment plan. There were no barriers to understanding identified.    Note dictated with [employer] Information [employer] software.  Completed without full type editing and sent to avoid delay.

## 2024-04-16 NOTE — PROGRESS NOTES
"ORTHOPAEDIC SURGERY HISTORY & PHYSICAL     Chief Complaint:  Left ankle pain and swelling    History Of Present Illness  Marissa Mosquera \"Harman or Ms Mosquera\" is a 69 y.o. female who presents for evaluation of left ankle pain and swelling.  This has been ongoing for several months, she reports that this all began with a injury sustained from February.  Pain is typically described as 6 out of 10.  She has had swelling which typically onsets approximately 1 hour into standing.  She has had a recent duplex ultrasound which was negative for clot.  She describes the development of 3+ pitting edema which worsens with walking and standing.  This is also associated with a burning pain that radiates up her leg.  She is also having difficulty when going up and down stairs.     Past Medical History  Past Medical History:   Diagnosis Date    Anesthesia of skin 04/20/2015    Finger numbness    Arthritis 2008    Cataract 2018    Chronic kidney disease 2023    Conjunctival cysts, left eye 08/14/2018    Conjunctival cyst of left eye    Disease of thyroid gland 1985    GERD (gastroesophageal reflux disease) 1976    Hypertension 2015    Hypertensive retinopathy, bilateral 08/20/2019    Hypertensive retinopathy of both eyes    Other conditions influencing health status 02/15/2017    Compression fracture    Peptic ulceration 2023    Personal history of diseases of the skin and subcutaneous tissue 11/01/2018    History of urticaria    Personal history of other diseases of the circulatory system 11/01/2018    History of hypertension    Personal history of other specified conditions 10/31/2018    History of diarrhea    Personal history of other specified conditions 07/27/2015    History of vertigo    Retinal hemorrhage, left eye 02/13/2018    Retinal hemorrhage of left eye    Varicella 1957    Visual impairment 1963       Surgical History  Recent Surgeries in Orthopaedic Surgery            No cases to display             Social " History  Social History     Socioeconomic History    Marital status:      Spouse name: Not on file    Number of children: Not on file    Years of education: Not on file    Highest education level: Not on file   Occupational History    Not on file   Tobacco Use    Smoking status: Never    Smokeless tobacco: Never   Substance and Sexual Activity    Alcohol use: Never    Drug use: Never    Sexual activity: Not Currently     Partners: Male   Other Topics Concern    Not on file   Social History Narrative    Not on file     Social Determinants of Health     Financial Resource Strain: Not on file   Food Insecurity: No Food Insecurity (12/5/2023)    Hunger Vital Sign     Worried About Running Out of Food in the Last Year: Never true     Ran Out of Food in the Last Year: Never true   Transportation Needs: Not on file   Physical Activity: Not on file   Stress: Not on file   Social Connections: Not on file   Intimate Partner Violence: Not on file   Housing Stability: Not on file       Family History  Family History   Problem Relation Name Age of Onset    Heart failure Mother Elisabet     Hypertension Mother Elisabet     Arthritis Mother Elisabet     Depression Mother Elisabet     Early natural death Mother Elisabet     Stroke Mother Elisabet     Heart failure Father Vernon     Heart disease Father Vernon     Other (Sinus problem) Brother      Cancer Other Grandfather     Cancer Maternal Grandfather Jose     Early natural death Maternal Grandfather Jose     Stroke Maternal Grandfather [facility]     Alcohol abuse Brother Mario     Diabetes Brother Mario     Hypertension Brother Mario     Alcohol abuse Brother Junior     Drug abuse Brother Junior     Stroke Mother's Sister Katharine         Allergies  No Known Allergies    Review of Systems  REVIEW OF SYSTEMS  Constitutional: no unplanned weight loss  Psychiatric: no suicidal ideation  ENT: no vision changes, no sinus problems  Pulmonary: no shortness of breath  Lymphatic: no enlarged lymph  nodes  Cardiovascular: no chest pain or shortness of breath  Gastrointestinal: no stomach problems  Genitourinary: no dysuria   Skin: no rashes  Endocrine: no thyroid problems  Neurological: no headache, no numbness  Hematological: no easy bruising  Musculoskeletal: Left ankle pain    Physical Exam  PHYSICAL EXAMINATION  Constitutional Exam: well developed and well nourished  Psychiatric Exam: alert and oriented, appropriate mood and behavior  Eye Exam: EOMI  Pulmonary Exam: breathing non-labored, no apparent distress  Lymphatic exam: no appreciable lymphadenopathy in the lower extremities  Cardiovascular exam: RRR to peripheral palpation, DP pulses 2+, PT 2+, toes are pink with good capillary refill, no pitting edema  Skin exam: no open lesions, rashes, abrasions or ulcerations  Neurological exam: sensation to light touch intact in both lower extremities in peripheral and dermatomal distributions (except for any abnormalities noted in musculoskeletal exam)    Musculoskeletal exam: Left lower extremity examination.  Patient pain localized diffusely about the distal leg and about the ankle.  She has no obvious ankle effusion.  She is somewhat tender to palpation overlying the anterior tibiotalar joint, nontender to palpation at the sinus Tarsi.  Patient is nontender to palpation at the medial malleolus or distal fibula.  No obvious tenderness to palpation at the PTT and no obvious bogginess to the tendon.  Patient does have mild tenderness to palpation overlying the dorsum of the midfoot, no exquisite tenderness to palpation at the navicular.  She has a negative Tinel's at the posterior tarsal tunnel.  Patient has sensation intact to light touch grossly in a saphenous, sural, superficial peroneal, deep peroneal and tibial nerve distribution.  Patient has 5 out of 5 strength in plantarflexion, dorsiflexion and EHL. Patient has 2+ DP/PT pulse palpated.    Last Recorded Vitals  There were no vitals taken for this  visit.    Laboratory Results    No results found for this or any previous visit (from the past 24 hour(s)).    Radiology Results   X-ray imaging left ankle as well as MRI left ankle reviewed and independently evaluated by me on 04/16/2024 and independently evaluated by me demonstrates no acute fracture or dislocation.  There is increased anterior distal tibial osteophytes and apparent corresponding changes of the dorsal talar neck, likely consistent with anterior ankle impingement versus osteoarthritis.  There are subtle well-corticated fragments just distal to the fibula, likely sequela of prior trauma.  MRI findings consistent with TT OA  There is suspected degenerative changes of the midfoot including second and third TMTJ and increased marrow signal in the navicular on T2 images without corresponding changes on T1.  Nonweightbearing imaging somewhat restricts interpretation of previous imaging.    Assessment/Plan:  69-year-old female who in my impression has left ankle pain and swelling likely at least partially attributable to left tibiotalar osteoarthritis and midfoot osteoarthritis.  I have reviewed the diagnosis and treatment options extensively with the patient. In my impression, the patient may continue weightbearing as tolerated in her left lower extremity.  I will provide her with information regarding a lace up style ankle brace and certainly the patient may consider [place] style bracing for her ankle in the future.  I reviewed the therapeutic and diagnostic benefits of a left tibiotalar CSI, patient would defer at this time.  I discussed that I am unable to explain her significant 3+ pitting edema with prolonged weightbearing but did discuss the possibility of referral to vascular medicine in the future.  I will plan to see the patient back on an as-needed basis.  I encouraged the patient to contact the office if she develops any new pain, worsening symptoms or he has any further questions.  Upon  return, patient would require 3 view weightbearing left foot and ankle.    Bhupendra Kelly MD, ANNE-MARIE  Department of Orthopaedic Surgery  [facility]    The diagnosis and treatment plan were reviewed with the patient. All questions were answered. The patient verbalized understanding of the treatment plan. There were no barriers to understanding identified.    Note dictated with [employer] software.  Completed without full type editing and sent to avoid delay.

## 2024-04-19 ENCOUNTER — HOSPITAL ENCOUNTER (OUTPATIENT)
Dept: RADIOLOGY | Facility: HOSPITAL | Age: 69
Discharge: HOME | End: 2024-04-19
Payer: MEDICARE

## 2024-04-19 ENCOUNTER — LAB (OUTPATIENT)
Dept: LAB | Facility: LAB | Age: 69
End: 2024-04-19
Payer: MEDICARE

## 2024-04-19 VITALS — HEIGHT: 61 IN | WEIGHT: 193 LBS | BODY MASS INDEX: 36.44 KG/M2

## 2024-04-19 DIAGNOSIS — Z12.31 ENCOUNTER FOR SCREENING MAMMOGRAM FOR BREAST CANCER: ICD-10-CM

## 2024-04-19 DIAGNOSIS — M25.473 ANKLE SWELLING, UNSPECIFIED LATERALITY: ICD-10-CM

## 2024-04-19 LAB — URATE SERPL-MCNC: 6.7 MG/DL (ref 2.3–6.7)

## 2024-04-19 PROCEDURE — 36415 COLL VENOUS BLD VENIPUNCTURE: CPT

## 2024-04-19 PROCEDURE — 77067 SCR MAMMO BI INCL CAD: CPT | Performed by: STUDENT IN AN ORGANIZED HEALTH CARE EDUCATION/TRAINING PROGRAM

## 2024-04-19 PROCEDURE — 84550 ASSAY OF BLOOD/URIC ACID: CPT

## 2024-04-19 PROCEDURE — 77063 BREAST TOMOSYNTHESIS BI: CPT | Performed by: STUDENT IN AN ORGANIZED HEALTH CARE EDUCATION/TRAINING PROGRAM

## 2024-04-19 PROCEDURE — 77067 SCR MAMMO BI INCL CAD: CPT

## 2024-04-22 ENCOUNTER — PATIENT MESSAGE (OUTPATIENT)
Dept: PRIMARY CARE | Facility: CLINIC | Age: 69
End: 2024-04-22
Payer: MEDICARE

## 2024-04-22 DIAGNOSIS — M10.9 GOUT OF LEFT ANKLE, UNSPECIFIED CAUSE, UNSPECIFIED CHRONICITY: Primary | ICD-10-CM

## 2024-04-22 DIAGNOSIS — K58.0 IRRITABLE BOWEL SYNDROME WITH DIARRHEA: Primary | ICD-10-CM

## 2024-04-22 NOTE — PATIENT COMMUNICATION
Called pt   Pt has a Hx of gout in her ankle. MRI was somewhat inconclusive.   No redness or warmth  Last gout was 5 yr ago  Since doing better will hold off on Rx   Pt will call and can send in colchicine/check uric acid

## 2024-04-24 ENCOUNTER — APPOINTMENT (OUTPATIENT)
Dept: SLEEP MEDICINE | Facility: CLINIC | Age: 69
End: 2024-04-24
Payer: MEDICARE

## 2024-05-09 ENCOUNTER — OFFICE VISIT (OUTPATIENT)
Dept: OTOLARYNGOLOGY | Facility: CLINIC | Age: 69
End: 2024-05-09
Payer: MEDICARE

## 2024-05-09 VITALS — BODY MASS INDEX: 36.44 KG/M2 | WEIGHT: 193 LBS | TEMPERATURE: 96.4 F | HEIGHT: 61 IN

## 2024-05-09 DIAGNOSIS — K14.8 TONGUE LESION: Primary | ICD-10-CM

## 2024-05-09 PROCEDURE — 1157F ADVNC CARE PLAN IN RCRD: CPT | Performed by: OTOLARYNGOLOGY

## 2024-05-09 PROCEDURE — 1036F TOBACCO NON-USER: CPT | Performed by: OTOLARYNGOLOGY

## 2024-05-09 PROCEDURE — 99203 OFFICE O/P NEW LOW 30 MIN: CPT | Performed by: OTOLARYNGOLOGY

## 2024-05-09 PROCEDURE — 1160F RVW MEDS BY RX/DR IN RCRD: CPT | Performed by: OTOLARYNGOLOGY

## 2024-05-09 PROCEDURE — 3008F BODY MASS INDEX DOCD: CPT | Performed by: OTOLARYNGOLOGY

## 2024-05-09 PROCEDURE — 1159F MED LIST DOCD IN RCRD: CPT | Performed by: OTOLARYNGOLOGY

## 2024-05-09 RX ORDER — BISMUTH SUBSALICYLATE 262 MG
1 TABLET,CHEWABLE ORAL DAILY
COMMUNITY

## 2024-05-09 NOTE — PROGRESS NOTES
"JULIETA Mosquera \"Harman or Ms Mosquera\" is a 69 y.o. female kindly referred by Dr. Longoria for evaluation of incidentally identified growth of the left anterior tongue.  It has not been causing her any symptoms at all.  She does not recall any trauma to this area.  She had a similar finding biopsied/excised on the right-hand side years ago that was benign      Past Medical History:   Diagnosis Date    Anesthesia of skin 04/20/2015    Finger numbness    Arthritis 2008    Cataract 2018    Chronic kidney disease 2023    Conjunctival cysts, left eye 08/14/2018    Conjunctival cyst of left eye    Disease of thyroid gland 1985    GERD (gastroesophageal reflux disease) 1976    Hypertension 2015    Hypertensive retinopathy, bilateral 08/20/2019    Hypertensive retinopathy of both eyes    Other conditions influencing health status 02/15/2017    Compression fracture    Peptic ulceration 2023    Personal history of diseases of the skin and subcutaneous tissue 11/01/2018    History of urticaria    Personal history of other diseases of the circulatory system 11/01/2018    History of hypertension    Personal history of other specified conditions 10/31/2018    History of diarrhea    Personal history of other specified conditions 07/27/2015    History of vertigo    Retinal hemorrhage, left eye 02/13/2018    Retinal hemorrhage of left eye    Varicella 1957    Visual impairment 1963            Medications:     Current Outpatient Medications:     amLODIPine (Norvasc) 10 mg tablet, Take 1 tablet (10 mg) by mouth once daily., Disp: , Rfl:     atorvastatin (Lipitor) 10 mg tablet, TAKE 1 TABLET BY MOUTH EVERY DAY, Disp: 90 tablet, Rfl: 3    calcium carbonate (Tums) 200 mg calcium chewable tablet, Chew., Disp: , Rfl:     empagliflozin (Jardiance) 10 mg, Take 1 tablet (10 mg) by mouth once daily. TAKE ONE TABLET BY MOUTH ONCE DAILY IN THE MORNING, Disp: 90 tablet, Rfl: 3    levothyroxine (Synthroid, Levoxyl) 75 mcg tablet, TAKE 1 TABLET " "BY MOUTH EVERY DAY, Disp: 90 tablet, Rfl: 3    multivitamin tablet, Take 1 tablet by mouth once daily., Disp: , Rfl:     omeprazole (PriLOSEC) 40 mg DR capsule, Take 1 capsule (40 mg) by mouth once daily in the morning. Take before meals., Disp: , Rfl:     ondansetron (Zofran) 4 mg tablet, Take by mouth., Disp: , Rfl:      Allergies:  No Known Allergies     Physical Exam:  Last Recorded Vitals  Temperature 35.8 °C (96.4 °F), height 1.549 m (5' 1\"), weight 87.5 kg (193 lb).  General:     General appearance: Well-developed, well-nourished in no acute distress.       Voice:  normal       Head/face: Normal appearance; nontender to palpation     Facial nerve function: Normal and symmetric bilaterally.    Oral/oropharynx:     Oral vestibule: Normal labial and gingival mucosa     Tongue/floor of mouth: Essentially normal.  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  It is smooth, pedunculated, few millimeters in diameter     Oropharynx: Clear.  No lesions present of the hard/soft palate, posterior pharynx    Neck:     Neck: Normal appearance, trachea midline     Salivary glands: Normal to palpation bilaterally     Lymph nodes: No cervical lymphadenopathy to palpation     Thyroid: No thyromegaly.  No palpable nodules     Range of motion: Normal    Neurological:     Cortical functions: Alert and oriented x3, appropriate affect       Larynx/hypopharynx:     Laryngeal findings: Mirror exam inadequate or limited secondary to enlarged base of tongue and/or excessive gagging    Ear:     Ear canal: Normal bilaterally     Tympanic membrane: Intact and mobile bilaterally     Pinna: Normal bilaterally     Hearing:  Gross hearing assessment normal by voice    Nose:     Visualized using: Anterior rhinoscopy     Nasopharynx: Inadequate mirror exam secondary to gag, anatomy.       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  It is asymptomatic and easy for her to observe.  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  I think this is reasonable and I am happy to take another look at any point in the future.        Jin Lozano MD

## 2024-05-31 ENCOUNTER — DOCUMENTATION (OUTPATIENT)
Dept: GASTROENTEROLOGY | Facility: CLINIC | Age: 69
End: 2024-05-31
Payer: MEDICARE

## 2024-05-31 DIAGNOSIS — R19.7 DIARRHEA, UNSPECIFIED TYPE: Primary | ICD-10-CM

## 2024-06-01 ENCOUNTER — LAB (OUTPATIENT)
Dept: LAB | Facility: LAB | Age: 69
End: 2024-06-01
Payer: MEDICARE

## 2024-06-01 DIAGNOSIS — R19.7 DIARRHEA, UNSPECIFIED TYPE: ICD-10-CM

## 2024-06-10 ENCOUNTER — NUTRITION (OUTPATIENT)
Dept: NUTRITION | Facility: CLINIC | Age: 69
End: 2024-06-10
Payer: MEDICARE

## 2024-06-10 VITALS — WEIGHT: 189 LBS | HEIGHT: 61 IN | BODY MASS INDEX: 35.68 KG/M2

## 2024-06-10 DIAGNOSIS — R53.83 OTHER FATIGUE: ICD-10-CM

## 2024-06-10 DIAGNOSIS — K63.8219 SMALL INTESTINAL BACTERIAL OVERGROWTH: ICD-10-CM

## 2024-06-10 PROCEDURE — 97802 MEDICAL NUTRITION INDIV IN: CPT | Performed by: DIETITIAN, REGISTERED

## 2024-06-10 NOTE — PROGRESS NOTES
Reason for Nutrition Visit:  Pt is a 69 y.o. female being seen at [facility]. Pt was referred by Marlee Angela, effective 6/10/24.   1. Small intestinal bacterial overgrowth  Referral to Nutrition Services      2. Other fatigue  Referral to Nutrition Services         Past Medical Hx:  Patient Active Problem List   Diagnosis    Abnormal brain MRI    Abnormal finding in urine    Abnormal intestinal absorption (HHS-HCC)    Abnormality of gait    Achilles tendinitis of right lower extremity    Adjustment disorder with depressed mood    Arthritis of first MTP joint    Allopurinol adverse reaction    Asthma (HHS-HCC)    Benign essential hypertension    Benign positional vertigo    Dry eyes, bilateral    Carotidynia    Cervical spinal stenosis    Cervical spondylosis without myelopathy    Choroidal nevus of left eye    Chronic pain    Chronic pain of both shoulders    Stage 3b chronic kidney disease (Multi)    Combined form of age-related cataract, right eye    Compression fracture of L1 lumbar vertebra (Multi)    Dizziness    Dyslipidemia    Essential tremor    Fatigue    Fracture of shaft of ulna    Functional bowel disease    Gastroparesis    Gout    H/O diplopia    History of bariatric surgery    HLD (hyperlipidemia)    HTN (hypertension)    Hypercalcemia    Hypertrophy of breast    Hypoglycemia    Hypothyroidism    Infected prosthetic knee joint (CMS-HCC)    Insomnia with sleep apnea    Intracranial arachnoid cyst    Irritable bowel syndrome with diarrhea    Lumbar radiculitis    Lumbosacral disc herniation    Macular drusen    Mild renal insufficiency    Myalgia    Myopia of left eye    Nevus of iris of right eye    Arthritis    Esophageal dysphagia    Gastroesophageal reflux disease    Hernia, hiatal    Gastritis    Nonsurgical dumping syndrome    Radial styloid tenosynovitis    Shoulder arthritis    Tremor of right hand    Orthostatic lightheadedness    Osteopenia    Osteoporosis    Other bursal cyst, unspecified hand  "   Overweight    Palpitations    Periorbital edema of right eye    Peripheral neuropathy    Pre-diabetes    Retinal hemorrhage, left eye    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    CABRERA (dyspnea on exertion)    Shortness of breath at rest    Malabsorption (HHS-HCC)    History of replacement of both shoulder joints    Syncope    Vision changes    Visual disturbance    Vitamin B12 deficiency    Vitamin D insufficiency    Weight loss    Second degree burn of right hand    Burn, first degree    Class 2 drug-induced obesity with body mass index (BMI) of 35.0 to 35.9 in adult    Early dry stage nonexudative age-related macular degeneration of both eyes    Combined form of age-related cataract, left eye    Hypertensive retinopathy of both eyes    Presbyopia        Current Outpatient Medications:     amLODIPine (Norvasc) 10 mg tablet, Take 1 tablet (10 mg) by mouth once daily., Disp: , Rfl:     atorvastatin (Lipitor) 10 mg tablet, TAKE 1 TABLET BY MOUTH EVERY DAY, Disp: 90 tablet, Rfl: 3    calcium carbonate (Tums) 200 mg calcium chewable tablet, Chew., Disp: , Rfl:     empagliflozin (Jardiance) 10 mg, Take 1 tablet (10 mg) by mouth once daily. TAKE ONE TABLET BY MOUTH ONCE DAILY IN THE MORNING, Disp: 90 tablet, Rfl: 3    levothyroxine (Synthroid, Levoxyl) 75 mcg tablet, TAKE 1 TABLET BY MOUTH EVERY DAY, Disp: 90 tablet, Rfl: 3    multivitamin tablet, Take 1 tablet by mouth once daily., Disp: , Rfl:     omeprazole (PriLOSEC) 40 mg DR capsule, Take 1 capsule (40 mg) by mouth once daily in the morning. Take before meals., Disp: , Rfl:     ondansetron (Zofran) 4 mg tablet, Take by mouth., Disp: , Rfl:      Anthropometrics:  Anthropometrics  Height: 154.9 cm (5' 0.98\")  Weight: 85.7 kg (189 lb)  BMI (Calculated): 35.73   Weight change:  - 10 lbs and this 5%  Significant Weight Change: No  2017 Nissen Fundoplication, Gastroplasty (sleeve gastrectomy) and Esophogeal Lengthened  Fundoplication 2002 this was reversed "   5/2024 199 lbs     Lab Results   Component Value Date    HGBA1C 5.7 (H) 03/18/2024    CHOL 190 09/19/2023    LDLF 91 09/19/2023    TRIG 72 09/19/2023    HDL 84.6 09/19/2023        Chemistry    Lab Results   Component Value Date/Time     03/18/2024 1228    K 4.7 03/18/2024 1228     03/18/2024 1228    CO2 26 03/18/2024 1228    BUN 20 03/18/2024 1228    CREATININE 1.22 (H) 03/18/2024 1228    Lab Results   Component Value Date/Time    CALCIUM 9.6 03/18/2024 1228    ALKPHOS 103 03/18/2024 1228    AST 17 03/18/2024 1228    ALT 15 03/18/2024 1228    BILITOT 0.5 03/18/2024 1228         Lab Results   Component Value Date    ZMGFMNVB24 221 03/18/2024      MAGNESIUM  Magnesium  Collected: 03/18/24 1228   Result status: Final   Resulting lab: [facility]   Reference range: 1.60 - 2.40 mg/dL   Value: 2.46 High      PTH   Date Value Ref Range Status   09/19/2023 57.9 18.5 - 88.0 pg/mL Final       Lab Results   Component Value Date    WBC 6.5 03/18/2024    HGB 14.9 03/18/2024    HCT 47.5 (H) 03/18/2024    MCV 87 03/18/2024     03/18/2024      Food and Nutrition Hx:  This pt present with a complex GI hx with many current symptoms. She appears very unsettled and tired of feeling that way that is she. She wanted to share her story as she has been dealing with this for the past 25 years.  In 2017 Nissen Fundoplication, Gastroplasty (sleeve gastrectomy) and Esophogeal Lengthened. In 2002, Fundoplication this was reversed. She has gastroparesis. She states she received nutrition supports for some time.  She reports past providers believed she had an eating disorder. She denies  dumping. Pt states she has been currently dealing with SIBO. She has been on 3 rounds of anti-biotics. Pt states she feels like something else is happening. She notes gastric dysmotility related to the SIBO. Right after this appointment, she also had a fungal infection and started an anti-fungal medication.     She can have excessive gas,  burping, tightness in the belly. She states she has bloating. She will then have running noise and water eyes. She then will have bowel movements with type 5-7 with stools with up to 10 times a day. When this happens, she loses functional being. Last week she through up on chicken and bread.     She is on Jardiance. She has kidney decline.     She can have eggs cooked. She can have white fish.   She can have fear of eating in social situations.     24 Diet Recall:  Meal 1:  Meal 2: 1/2 hamburger and 4 ounces of Lemon-aide (kcal 300). He had nausea and pain after this.    Meal 3:  Snacks:  Beverages: 12 ounces of water and 24 ounces of black tea with lemon and 1 -2 T of honey (kcal 120). She does not tolerate water.   She can have dizziness upon standing. She can dark urine.   Total calories consumed is ~670 calories and this provides only 56% of kcal needs.     Allergies: None  Intolerance: Lactose and Gluten  Appetite: Fluctuates  Intake: <25%  GI Symptoms : diarrhea, increased gas, bloating, and see above   Frequency: frequent  Swallowing Difficulty: No problems with swallowing  Dentition : own    Types of Activities: Mostly Sedentary    Sleep duration/quality : 5-6 hours and disrupted sleep    Supplements: Multivitamin gummy with B-12 daily. Pt is not taking magnesium.       Nutrition Focused Physical Exam:    Performed/Deferred: Performed    Nutrition Focused Physical Exam:  Subcutaneous Fat Loss  Orbital Fat Pads: Mild-Moderate (slight dark circles and slight hollowing)  Buccal Fat Pads: Well nourished (full, rounded cheeks)  Triceps: Mild-Moderate (less than ample fat tissue)  Ribs: Well nourished (chest is full, ribs do not show, slight to no protrusion of the iliac crest)  Muscle Wasting  Temporalis: Mild-Moderate (slight depression)  Pectoralis (Clavicular Region): Well nourished (clavicle not visible)  Deltoid/Trapezius: Well nourished (rounded appearance at arm, shoulder, neck)  Interosseous: Well  "nourished (muscle bulges)  Trapezius/Infraspinatus/Supraspinatus (Scapular Region): Well nourished (bones not prominent, muscle taut)  Quadriceps: Well nourished (well developed, well rounded)  Gastrocnemius: Well nourished (well developed bulbous muscle)  Edema  Edema: none  Physical Findings (Nutrition Deficiency/Toxicity)  Hair: Positive (hair loss)  Eyes: Positive (dark circles under eyes)  Mouth: Negative  Nails: Negative  Skin: Positive (poor skin turgor)     Malnutrition Present: Moderate Malnutrition    Estimated Energy Needs:  Energy Needs  Calculated Energy Needs Using Equations  Height: 154.9 cm (5' 0.98\")  Weight Used for Equation Calculations: 85.7 kg (189 lb)  Won Hodge Equation (Overweight or Obese Patients): 1314  Equation Chosen to Use by RD: Maria Antonia Goncalves  Activity Factor: 1.2  Total Energy Needs: 1576  Estimated Energy Needs  Total Energy Estimated Needs (kCal): 1200 kCal     Nutrition Diagnosis:    Diagnosis Statement 1:  Diagnosis Status: New  Diagnosis : Malnutrition; moderate chronic disease or condition related related to  multiple GI issues that has limited oral intake, increased losses and increased nutrient needs  as evidenced by  oral intake at 25% coupled with muscle and subtaneous fat loss as identified by the NFPE.     Diagnosis Statement 2:  Diagnosis Status: New  Diagnosis : Altered GI function  related to alteration in gastrointestinal tract structure and/or function  that has influenced oral intake as evidenced by  hx of sleeve gastrectomy with recent SIBO and fungal infection.     Diagnosis Statement 3:   Diagnosis Status: New  Diagnosis : Inadequate protein-energy intake  related to alteration in gastrointestinal tract structure and/or function as evidenced by  protein and kcal intake is not even at 50% of needs.     Nutrition Interventions:  Medical nutrition therapy was given for SIBO and other GI issues.   Nutrition Counseling: Motivational Interviewing and " CBT  Coordination of Care: None    Nutrition Recommendation:     1,600 calories per day to help to nourish the body. Oral intake greater then 75% of needs. Improve GI symptoms by selecting foods to minimize symptoms related to IBS, hx of gastric surgery reported to be reversed with malabsorption tendencies and mobility dysfunction and now both bacterial and fungal infection. Adequate and modest protein intake of 60 grams per day. Meal plan to first address malnutrition and GI function issues and then address CKD, pre-diabetes, osteoporosis and heart health.     Nutrition Goals:   Via teach back method patient verbalized understanding of the following topics:  1) Strive to consume something with protein with 2 hours of waking. This should trigger peristalsis to help move bowel movements.   2) Ideally try to have protein three times a day.   3) Strive to keep a journal to include what is consumed and what if felt. Pay attention to he gas, bloating, running nose, water eyes, BM and type of stools.  4) Follow recommendations of Julio César to start liquid IV.     Educational Handouts: FODMAP (could have given the candida handout at time of appt but did not find out until after the appt)   Next probiotic/supplements      Tanya Meyer, MS, RDN, LD, DAVION   Advanced Practice Clinical Dietitian  [email or web address]  Schedule line [phone]  Direct line [phone]     Readiness to Change : Good  Level of Understanding : Good  Anticipated Compliant : Good

## 2024-06-13 ENCOUNTER — LAB (OUTPATIENT)
Dept: LAB | Facility: LAB | Age: 69
End: 2024-06-13
Payer: MEDICARE

## 2024-06-13 ENCOUNTER — OFFICE VISIT (OUTPATIENT)
Dept: GASTROENTEROLOGY | Facility: CLINIC | Age: 69
End: 2024-06-13
Payer: MEDICARE

## 2024-06-13 VITALS
DIASTOLIC BLOOD PRESSURE: 81 MMHG | HEIGHT: 60 IN | WEIGHT: 193 LBS | BODY MASS INDEX: 37.89 KG/M2 | TEMPERATURE: 98.1 F | HEART RATE: 73 BPM | SYSTOLIC BLOOD PRESSURE: 137 MMHG

## 2024-06-13 DIAGNOSIS — K63.8219 SMALL INTESTINAL BACTERIAL OVERGROWTH: Primary | ICD-10-CM

## 2024-06-13 DIAGNOSIS — Z91.09 OTHER ALLERGY STATUS, OTHER THAN TO DRUGS AND BIOLOGICAL SUBSTANCES: ICD-10-CM

## 2024-06-13 DIAGNOSIS — K63.8219 SMALL INTESTINAL BACTERIAL OVERGROWTH: ICD-10-CM

## 2024-06-13 PROCEDURE — 1160F RVW MEDS BY RX/DR IN RCRD: CPT | Performed by: NURSE PRACTITIONER

## 2024-06-13 PROCEDURE — 36415 COLL VENOUS BLD VENIPUNCTURE: CPT

## 2024-06-13 PROCEDURE — 86003 ALLG SPEC IGE CRUDE XTRC EA: CPT

## 2024-06-13 PROCEDURE — 99214 OFFICE O/P EST MOD 30 MIN: CPT | Performed by: NURSE PRACTITIONER

## 2024-06-13 PROCEDURE — 3075F SYST BP GE 130 - 139MM HG: CPT | Performed by: NURSE PRACTITIONER

## 2024-06-13 PROCEDURE — 86038 ANTINUCLEAR ANTIBODIES: CPT

## 2024-06-13 PROCEDURE — 1159F MED LIST DOCD IN RCRD: CPT | Performed by: NURSE PRACTITIONER

## 2024-06-13 PROCEDURE — 3008F BODY MASS INDEX DOCD: CPT | Performed by: NURSE PRACTITIONER

## 2024-06-13 PROCEDURE — 3079F DIAST BP 80-89 MM HG: CPT | Performed by: NURSE PRACTITIONER

## 2024-06-13 PROCEDURE — 1157F ADVNC CARE PLAN IN RCRD: CPT | Performed by: NURSE PRACTITIONER

## 2024-06-13 RX ORDER — FLUCONAZOLE 200 MG/1
200 TABLET ORAL DAILY
Qty: 14 TABLET | Refills: 1 | Status: SHIPPED | OUTPATIENT
Start: 2024-06-13 | End: 2024-06-27

## 2024-06-13 NOTE — PROGRESS NOTES
Subjective   Patient ID: Harman or Ms Eveline Mosquera is a 69 y.o. female.    HPI    69-year-old female for follow-up of SIBO treated with Xifaxan  Previously seen 3/13/2024  At that time she was treated with Diflucan and probiotics and recommended to see a dietitian  I also recommended that time she see her cardiologist regarding the dizziness  3/18/2024 labs reviewed B12 221  TSH 2.50  Hemoglobin A1c 5.7%  Normal LFTs  BUN 20, creatinine 1.22  H&H 14.9 and 47.5  Not feeling good  Saw the dieticain on Monday  By Tuesday night could barely stand up  Was dizzy  Eyes water and nose runs  Pain over right eye and gets sweats, breathing gets labored and then feels in and out of consciousness  Lots of flatus for over 12 hrs  Wants to decrease omeprazole   No further diarrhea  Still bloated and pressure  Doesn't drink much fluids because she gets sick  Feels very frustrated that she is feeling the same again    Has gout flare up    Objective   Physical Exam  Cardiovascular:      Rate and Rhythm: Normal rate and regular rhythm.      Pulses: Normal pulses.      Heart sounds: Normal heart sounds.   Pulmonary:      Effort: Pulmonary effort is normal.      Breath sounds: Normal breath sounds.   Abdominal:      General: Bowel sounds are normal.      Palpations: Abdomen is soft.         Assessment/Plan     SIBO and SIFO- your diarrhea has resolved but you still continue to have bloating, gas and cramps. I would recommend using diflucan for 2 weeks to decrease the yeast in your GI tract. Please continue a high fiber diet and increase the water intake as well. I would recommend getting a blood test for RICHA and a food allergy panel as well.    ? ESPINOZA syndrome- I would recommend using Liquid IV to help you hydrate and increase the fluid that you absorb.    Reflux- you have not reflux and can decrease the omeprazole to 20 mg daily for the next month and then if no reflux you can take every other day and if no symptoms you can stop and  use Tums as needed.    I will call you with the test results and determine

## 2024-06-13 NOTE — PATIENT INSTRUCTIONS
SIBO and SIFO- your diarrhea has resolved but you still continue to have bloating, gas and cramps. I would recommend using diflucan for 2 weeks to decrease the yeast in your GI tract. Please continue a high fiber diet and increase the water intake as well. I would recommend getting a blood test for RICHA and a food allergy panel as well.    ? ESPINOZA syndrome- I would recommend using Liquid IV to help you hydrate and increase the fluid that you absorb.    Reflux- you have not reflux and can decrease the omeprazole to 20 mg daily for the next month and then if no reflux you can take every other day and if no symptoms you can stop and use Tums as needed.    I will call you with the test results and determine

## 2024-06-14 LAB
CLAM IGE QN: <0.1 KU/L
CODFISH IGE QN: <0.1 KU/L
CORN IGE QN: <0.1
EGG WHITE IGE QN: <0.1 KU/L
MILK IGE QN: <0.1 KU/L
PEANUT IGE QN: <0.1 KU/L
SCALLOP IGE QN: <0.1 KU/L
SESAME SEED IGE QN: <0.1 KU/L
SHRIMP IGE QN: <0.1 KU/L
SOYBEAN IGE QN: <0.1 KU/L
WALNUT IGE QN: <0.1 KU/L
WHEAT IGE QN: <0.1 KU/L

## 2024-06-17 LAB — ANA SER QL HEP2 SUBST: NEGATIVE

## 2024-06-18 DIAGNOSIS — R42 DIZZINESS: Primary | ICD-10-CM

## 2024-07-19 ENCOUNTER — APPOINTMENT (OUTPATIENT)
Dept: NUTRITION | Facility: CLINIC | Age: 69
End: 2024-07-19
Payer: MEDICARE

## 2024-07-23 ENCOUNTER — OFFICE VISIT (OUTPATIENT)
Dept: NEUROLOGY | Facility: CLINIC | Age: 69
End: 2024-07-23
Payer: MEDICARE

## 2024-07-23 VITALS
SYSTOLIC BLOOD PRESSURE: 135 MMHG | BODY MASS INDEX: 36.82 KG/M2 | WEIGHT: 195 LBS | DIASTOLIC BLOOD PRESSURE: 79 MMHG | HEIGHT: 61 IN | HEART RATE: 68 BPM | TEMPERATURE: 96.9 F

## 2024-07-23 DIAGNOSIS — R42 POSTURAL DIZZINESS WITH PRESYNCOPE: Primary | ICD-10-CM

## 2024-07-23 DIAGNOSIS — R55 POSTURAL DIZZINESS WITH PRESYNCOPE: Primary | ICD-10-CM

## 2024-07-23 PROCEDURE — 3075F SYST BP GE 130 - 139MM HG: CPT | Performed by: PSYCHIATRY & NEUROLOGY

## 2024-07-23 PROCEDURE — 1157F ADVNC CARE PLAN IN RCRD: CPT | Performed by: PSYCHIATRY & NEUROLOGY

## 2024-07-23 PROCEDURE — 3078F DIAST BP <80 MM HG: CPT | Performed by: PSYCHIATRY & NEUROLOGY

## 2024-07-23 PROCEDURE — 1159F MED LIST DOCD IN RCRD: CPT | Performed by: PSYCHIATRY & NEUROLOGY

## 2024-07-23 PROCEDURE — 3008F BODY MASS INDEX DOCD: CPT | Performed by: PSYCHIATRY & NEUROLOGY

## 2024-07-23 PROCEDURE — 99205 OFFICE O/P NEW HI 60 MIN: CPT | Performed by: PSYCHIATRY & NEUROLOGY

## 2024-07-23 PROCEDURE — 1160F RVW MEDS BY RX/DR IN RCRD: CPT | Performed by: PSYCHIATRY & NEUROLOGY

## 2024-07-23 PROCEDURE — 1036F TOBACCO NON-USER: CPT | Performed by: PSYCHIATRY & NEUROLOGY

## 2024-07-23 RX ORDER — ACETAMINOPHEN AND CODEINE PHOSPHATE 300; 30 MG/1; MG/1
TABLET ORAL
COMMUNITY
Start: 2024-06-17

## 2024-07-23 NOTE — PROGRESS NOTES
"NEUROLOGY OUTPATIENT INITIAL VISIT NOTE    Assessment/Plan   Diagnoses and all orders for this visit:  Postural dizziness with presyncope  -     Autonomic Testing; Future      IMPRESSION:  Episodic post-prandial hypotension with presyncope.  Her primary risk factors are age and previous history of SIBO.  She has no clear exam signs for autonomic neuropathy.  Even so, it is worth ruling it out given a lack of other positive testing.  B12 and TSH are normal and are so not factors in this presentation.  She has exam signs for right C5-6 radiculopathy.    PLAN:  I advised the patient to keep hydrated as best possible and to use compression stockings while up.  I advised her to check her blood pressure and her blood sugar when she has episodes to provide additional data.  I ordered autonomic testing to rule out an autonomic component to this.  She should avoid taking amlodipine before meals to reduce the risk for post-prandial hypotension.  I will see her again in three months or prn, or after the autonomic testing.        Octavio Ware Jr., M.D., FAAN     --------      Subjective     Marissa Mosquera is a 69 y.o. year old, right-handed female referred by Marlee Angela NP for \"dizziness\".  Her  accompanies her today.    HPI  She reports transient somatic symptoms over the last 5-6 years or so.    Her episodes typically begin within an hour of eating and start with rhinorrhea, tearing,photophobia, belching, sudden urge to urinate, sharp abdominal pain.  This may be accompanied by right supraorbital head pain.  She becomes lightheaded, lethargic, has generalized weakness, with the legs feeling heavy, and sits down.  She pants, though she doesn't feel short of breath.  She can counter it by lying down, and placing a cold cloth over the right eye.  She has to go to sleep for up to several hours to recover from this.    Frequency is about three times a week over the last few months.  Prior to that, spells were " still 3-4 times a week, but associated with diarrhea.  She has been treated for SIBO, and the diarrhea has resolved.  However, the other piece of the episodes described above have continued.    She feels debilitated from the spells and avoids going out with friends because she does not know when an episode will happen.    Of note is that she has no sense of hunger or satiety.    She notes separate burning dysesthesias of the left first two fingers and right second finger.    She denies other focal neurological symptoms including dysarthria, dysphagia, diplopia, focal weakness, other focal sensory change, ataxia, vertigo, or bowel/bladder incontinence, among others.      Review of Systems   All other systems reviewed and are negative.      Past Medical History:   Diagnosis Date    Anesthesia of skin 04/20/2015    Finger numbness    Arthritis 2008    Cataract 2018    Chronic kidney disease 2023    Conjunctival cysts, left eye 08/14/2018    Conjunctival cyst of left eye    Disease of thyroid gland 1985    GERD (gastroesophageal reflux disease) 1976    Hypertension 2015    Hypertensive retinopathy, bilateral 08/20/2019    Hypertensive retinopathy of both eyes    Other conditions influencing health status 02/15/2017    Compression fracture    Peptic ulceration 2023    Personal history of diseases of the skin and subcutaneous tissue 11/01/2018    History of urticaria    Personal history of other diseases of the circulatory system 11/01/2018    History of hypertension    Personal history of other specified conditions 10/31/2018    History of diarrhea    Personal history of other specified conditions 07/27/2015    History of vertigo    Retinal hemorrhage, left eye 02/13/2018    Retinal hemorrhage of left eye    Varicella 1957    Visual impairment 1963     Past Surgical History:   Procedure Laterality Date    BREAST BIOPSY      rt benign core    BREAST SURGERY  2007    COLONOSCOPY  06/08/2016    Complete Colonoscopy    CT  ANGIO NECK  06/28/2018    CT NECK ANGIO W AND WO IV CONTRAST 6/28/2018 [facility]    CT ANGIO NECK  08/29/2022    CT NECK ANGIO W AND WO IV CONTRAST 8/29/2022 [facility]    CT HEAD ANGIO W AND WO IV CONTRAST  06/28/2018    CT HEAD ANGIO W AND WO IV CONTRAST 6/28/2018 [facility]    CT HEAD ANGIO W AND WO IV CONTRAST  08/29/2022    CT HEAD ANGIO W AND WO IV CONTRAST 8/29/2022 [facility]    FRACTURE SURGERY  1980    GASTRIC FUNDOPLICATION  06/29/2017    Esophagogastric Fundoplasty Nissen Fundoplication    JOINT REPLACEMENT  2011, 2021,2022    MR HEAD ANGIO WO IV CONTRAST  09/29/2021    MR HEAD ANGIO WO IV CONTRAST 9/29/2021 [facility]    OTHER SURGICAL HISTORY  07/17/2017    Esophagogastric Fundoplasty Laparoscopic For Paraesophageal Hernia Repair    OTHER SURGICAL HISTORY  07/17/2017    Gastroplasty Longitudinal    OTHER SURGICAL HISTORY  07/17/2017    Esophageal Lengthening    OTHER SURGICAL HISTORY  07/17/2017    Repair Of Paraesophageal Hiatus Hernia    OTHER SURGICAL HISTORY  12/18/2017    Laparoscopy Repair Of Hernia    OTHER SURGICAL HISTORY  03/05/2019    Shoulder replacement    OTHER SURGICAL HISTORY  04/22/2015    Treatment Of Wrist Fracture    OTHER SURGICAL HISTORY  04/22/2015    Open Treatment Of Clavicular Fracture    OTHER SURGICAL HISTORY  04/22/2015    Breast Surgery Excision Of Breast Single Lesion    OTHER SURGICAL HISTORY  04/22/2015    Parathyroid Complete Parathyroidectomy    SMALL INTESTINE SURGERY  11/2016    SPINE SURGERY  2019    TOTAL KNEE ARTHROPLASTY  04/22/2015    Knee Replacement     Social History     Tobacco Use    Smoking status: Never    Smokeless tobacco: Never   Substance Use Topics    Alcohol use: Yes     Comment: Social but rarely     family history includes Alcohol abuse in her brother and brother; Arthritis in her mother; Cancer in her maternal grandfather and another family member; Depression in her mother; Diabetes in her brother; Drug  "abuse in her brother; Early natural death in her maternal grandfather and mother; Heart disease in her father; Heart failure in her father and mother; Hypertension in her brother and mother; Sinus problem in her brother; Stroke in her maternal grandfather, mother, and mother's sister.    Current Outpatient Medications:     acetaminophen-codeine (Tylenol w/ Codeine #3) 300-30 mg tablet, take 1 to 2 tablets by mouth every 4 to 6 hours as needed for pain, Disp: , Rfl:     amLODIPine (Norvasc) 10 mg tablet, Take 1 tablet (10 mg) by mouth once daily., Disp: , Rfl:     atorvastatin (Lipitor) 10 mg tablet, TAKE 1 TABLET BY MOUTH EVERY DAY, Disp: 90 tablet, Rfl: 3    calcium carbonate (Tums) 200 mg calcium chewable tablet, Chew., Disp: , Rfl:     empagliflozin (Jardiance) 10 mg, Take 1 tablet (10 mg) by mouth once daily. TAKE ONE TABLET BY MOUTH ONCE DAILY IN THE MORNING, Disp: 90 tablet, Rfl: 3    levothyroxine (Synthroid, Levoxyl) 75 mcg tablet, TAKE 1 TABLET BY MOUTH EVERY DAY, Disp: 90 tablet, Rfl: 3    multivitamin tablet, Take 1 tablet by mouth once daily., Disp: , Rfl:     ondansetron (Zofran) 4 mg tablet, Take by mouth., Disp: , Rfl:   No Known Allergies    Objective     /79   Pulse 68   Temp 36.1 °C (96.9 °F)   Ht 1.549 m (5' 1\")   Wt 88.5 kg (195 lb)   BMI 36.84 kg/m²     ORTHOSTATICS: (personally measured manually)  Supine: 118/64  pulse 64  Erect at 90 seconds:  118/72 with pulse 84    CONSTITUTIONAL:  No acute distress    CARDIOVASCULAR:  Normal pulses in the distal legs, no edema of either arm or either leg.  No carotid bruits.    MENTAL STATUS:  Awake, alert, fully oriented to self, place, and time, with present short-term memory, good awareness of recent events, normal attention span, concentration, and fund of knowledge.    SPEECH AND LANGUAGE:  Can name and repeat, follows all commands, has no dysarthria    CRANIAL NERVES:  II-Vision present, visual fields full to confrontational " testing    III/IV/VI--EOMs are present in all directions.  Pupils are symmetrically reactive in dim light.  No ptosis.    V--Normal facial sensation.    VII--No facial asymmetry.    VIII--Hearing present to voice bilaterally.    IX/X--Symmetric soft palate rise.    XI--Normal trapezius power bilaterally.    XII--Tongue protrudes without deviation.    MOTOR:  Normal power, tone, and bulk in both arms and both legs.    SENSORY:  Reduced pin sensation in the right lateral upper arm and radial forearm (C5-6).  Otherwise normal pin sensation in both arms and both legs without distal-proximal gradient, asymmetry, or spinal sensory level.    COORDINATION:  Normal finger-to-nose and heel-to-shin testing in both arms and both legs.    REFLEXES are normal and symmetric at the biceps, triceps, brachioradialis, patella, and ankle.  The plantar responses are flexor.    GAIT is normal, without steppage, ataxia, shuffling, or spasticity.    B12 and TSH done in the last year are normal.    Octavio Ware Jr., M.D., FAAN

## 2024-07-23 NOTE — LETTER
"July 23, 2024     Marlee Angela, APRYL-CNP  [address] [facility] [address]  [facility] [address]    Patient: Harman or Ms Eveline Mosquera   YOB: 1955   Date of Visit: 7/23/2024       Dear APRYL Yu-CNP:    Thank you for referring Harman or Ms Eveline Mosquera to me for neurological evaluation. Below are my notes for this consultation.  If you have questions, please do not hesitate to call me.       Sincerely,     Octavio Ware Jr., M.D., FAAN       CC: Brittany Behm, DO James Cireddu V, MD  ______________________________________________________________________________________    NEUROLOGY OUTPATIENT INITIAL VISIT NOTE    Assessment/Plan  Diagnoses and all orders for this visit:  Postural dizziness with presyncope  -     Autonomic Testing; Future      IMPRESSION:  Episodic post-prandial hypotension with presyncope.  Her primary risk factors are age and previous history of SIBO.  She has no clear exam signs for autonomic neuropathy.  Even so, it is worth ruling it out given a lack of other positive testing.  B12 and TSH are normal and are so not factors in this presentation.  She has exam signs for right C5-6 radiculopathy.    PLAN:  I advised the patient to keep hydrated as best possible and to use compression stockings while up.  I advised her to check her blood pressure and her blood sugar when she has episodes to provide additional data.  I ordered autonomic testing to rule out an autonomic component to this.  She should avoid taking amlodipine before meals to reduce the risk for post-prandial hypotension.  I will see her again in three months or prn, or after the autonomic testing.        Octavio Ware Jr., M.D., FAAN     --------      Subjective    Marissa Mosquera is a 69 y.o. year old, right-handed female referred by Marlee Angela NP for \"dizziness\".  Her  accompanies her today.    HPI  She reports transient somatic symptoms over the last 5-6 years or so.    Her " episodes typically begin within an hour of eating and start with rhinorrhea, tearing,photophobia, belching, sudden urge to urinate, sharp abdominal pain.  This may be accompanied by right supraorbital head pain.  She becomes lightheaded, lethargic, has generalized weakness, with the legs feeling heavy, and sits down.  She pants, though she doesn't feel short of breath.  She can counter it by lying down, and placing a cold cloth over the right eye.  She has to go to sleep for up to several hours to recover from this.    Frequency is about three times a week over the last few months.  Prior to that, spells were still 3-4 times a week, but associated with diarrhea.  She has been treated for SIBO, and the diarrhea has resolved.  However, the other piece of the episodes described above have continued.    She feels debilitated from the spells and avoids going out with friends because she does not know when an episode will happen.    Of note is that she has no sense of hunger or satiety.    She notes separate burning dysesthesias of the left first two fingers and right second finger.    She denies other focal neurological symptoms including dysarthria, dysphagia, diplopia, focal weakness, other focal sensory change, ataxia, vertigo, or bowel/bladder incontinence, among others.      Review of Systems   All other systems reviewed and are negative.      Past Medical History:   Diagnosis Date   • Anesthesia of skin 04/20/2015    Finger numbness   • Arthritis 2008   • Cataract 2018   • Chronic kidney disease 2023   • Conjunctival cysts, left eye 08/14/2018    Conjunctival cyst of left eye   • Disease of thyroid gland 1985   • GERD (gastroesophageal reflux disease) 1976   • Hypertension 2015   • Hypertensive retinopathy, bilateral 08/20/2019    Hypertensive retinopathy of both eyes   • Other conditions influencing health status 02/15/2017    Compression fracture   • Peptic ulceration 2023   • Personal history of diseases of the  skin and subcutaneous tissue 11/01/2018    History of urticaria   • Personal history of other diseases of the circulatory system 11/01/2018    History of hypertension   • Personal history of other specified conditions 10/31/2018    History of diarrhea   • Personal history of other specified conditions 07/27/2015    History of vertigo   • Retinal hemorrhage, left eye 02/13/2018    Retinal hemorrhage of left eye   • Varicella 1957   • Visual impairment 1963     Past Surgical History:   Procedure Laterality Date   • BREAST BIOPSY      rt benign core   • BREAST SURGERY  2007   • COLONOSCOPY  06/08/2016    Complete Colonoscopy   • CT ANGIO NECK  06/28/2018    CT NECK ANGIO W AND WO IV CONTRAST 6/28/2018 [facility]   • CT ANGIO NECK  08/29/2022    CT NECK ANGIO W AND WO IV CONTRAST 8/29/2022 [facility]   • CT HEAD ANGIO W AND WO IV CONTRAST  06/28/2018    CT HEAD ANGIO W AND WO IV CONTRAST 6/28/2018 [facility]   • CT HEAD ANGIO W AND WO IV CONTRAST  08/29/2022    CT HEAD ANGIO W AND WO IV CONTRAST 8/29/2022 [facility]   • FRACTURE SURGERY  1980   • GASTRIC FUNDOPLICATION  06/29/2017    Esophagogastric Fundoplasty Nissen Fundoplication   • JOINT REPLACEMENT  2011, 2021,2022   • MR HEAD ANGIO WO IV CONTRAST  09/29/2021    MR HEAD ANGIO WO IV CONTRAST 9/29/2021 [facility]   • OTHER SURGICAL HISTORY  07/17/2017    Esophagogastric Fundoplasty Laparoscopic For Paraesophageal Hernia Repair   • OTHER SURGICAL HISTORY  07/17/2017    Gastroplasty Longitudinal   • OTHER SURGICAL HISTORY  07/17/2017    Esophageal Lengthening   • OTHER SURGICAL HISTORY  07/17/2017    Repair Of Paraesophageal Hiatus Hernia   • OTHER SURGICAL HISTORY  12/18/2017    Laparoscopy Repair Of Hernia   • OTHER SURGICAL HISTORY  03/05/2019    Shoulder replacement   • OTHER SURGICAL HISTORY  04/22/2015    Treatment Of Wrist Fracture   • OTHER SURGICAL HISTORY  04/22/2015    Open Treatment Of Clavicular Fracture   • OTHER  "SURGICAL HISTORY  04/22/2015    Breast Surgery Excision Of Breast Single Lesion   • OTHER SURGICAL HISTORY  04/22/2015    Parathyroid Complete Parathyroidectomy   • SMALL INTESTINE SURGERY  11/2016   • SPINE SURGERY  2019   • TOTAL KNEE ARTHROPLASTY  04/22/2015    Knee Replacement     Social History     Tobacco Use   • Smoking status: Never   • Smokeless tobacco: Never   Substance Use Topics   • Alcohol use: Yes     Comment: Social but rarely     family history includes Alcohol abuse in her brother and brother; Arthritis in her mother; Cancer in her maternal grandfather and another family member; Depression in her mother; Diabetes in her brother; Drug abuse in her brother; Early natural death in her maternal grandfather and mother; Heart disease in her father; Heart failure in her father and mother; Hypertension in her brother and mother; Sinus problem in her brother; Stroke in her maternal grandfather, mother, and mother's sister.    Current Outpatient Medications:   •  acetaminophen-codeine (Tylenol w/ Codeine #3) 300-30 mg tablet, take 1 to 2 tablets by mouth every 4 to 6 hours as needed for pain, Disp: , Rfl:   •  amLODIPine (Norvasc) 10 mg tablet, Take 1 tablet (10 mg) by mouth once daily., Disp: , Rfl:   •  atorvastatin (Lipitor) 10 mg tablet, TAKE 1 TABLET BY MOUTH EVERY DAY, Disp: 90 tablet, Rfl: 3  •  calcium carbonate (Tums) 200 mg calcium chewable tablet, Chew., Disp: , Rfl:   •  empagliflozin (Jardiance) 10 mg, Take 1 tablet (10 mg) by mouth once daily. TAKE ONE TABLET BY MOUTH ONCE DAILY IN THE MORNING, Disp: 90 tablet, Rfl: 3  •  levothyroxine (Synthroid, Levoxyl) 75 mcg tablet, TAKE 1 TABLET BY MOUTH EVERY DAY, Disp: 90 tablet, Rfl: 3  •  multivitamin tablet, Take 1 tablet by mouth once daily., Disp: , Rfl:   •  ondansetron (Zofran) 4 mg tablet, Take by mouth., Disp: , Rfl:   No Known Allergies    Objective    /79   Pulse 68   Temp 36.1 °C (96.9 °F)   Ht 1.549 m (5' 1\")   Wt 88.5 kg (195 lb) "   BMI 36.84 kg/m²     ORTHOSTATICS: (personally measured manually)  Supine: 118/64  pulse 64  Erect at 90 seconds:  118/72 with pulse 84    CONSTITUTIONAL:  No acute distress    CARDIOVASCULAR:  Normal pulses in the distal legs, no edema of either arm or either leg.  No carotid bruits.    MENTAL STATUS:  Awake, alert, fully oriented to self, place, and time, with present short-term memory, good awareness of recent events, normal attention span, concentration, and fund of knowledge.    SPEECH AND LANGUAGE:  Can name and repeat, follows all commands, has no dysarthria    CRANIAL NERVES:  II-Vision present, visual fields full to confrontational testing    III/IV/VI--EOMs are present in all directions.  Pupils are symmetrically reactive in dim light.  No ptosis.    V--Normal facial sensation.    VII--No facial asymmetry.    VIII--Hearing present to voice bilaterally.    IX/X--Symmetric soft palate rise.    XI--Normal trapezius power bilaterally.    XII--Tongue protrudes without deviation.    MOTOR:  Normal power, tone, and bulk in both arms and both legs.    SENSORY:  Reduced pin sensation in the right lateral upper arm and radial forearm (C5-6).  Otherwise normal pin sensation in both arms and both legs without distal-proximal gradient, asymmetry, or spinal sensory level.    COORDINATION:  Normal finger-to-nose and heel-to-shin testing in both arms and both legs.    REFLEXES are normal and symmetric at the biceps, triceps, brachioradialis, patella, and ankle.  The plantar responses are flexor.    GAIT is normal, without steppage, ataxia, shuffling, or spasticity.    B12 and TSH done in the last year are normal.    Octavio Ware Jr., M.D., FAAN

## 2024-07-31 ENCOUNTER — HOSPITAL ENCOUNTER (OUTPATIENT)
Dept: NEUROLOGY | Facility: HOSPITAL | Age: 69
Discharge: HOME | End: 2024-07-31
Payer: MEDICARE

## 2024-07-31 DIAGNOSIS — R42 POSTURAL DIZZINESS WITH PRESYNCOPE: ICD-10-CM

## 2024-07-31 DIAGNOSIS — R55 POSTURAL DIZZINESS WITH PRESYNCOPE: ICD-10-CM

## 2024-07-31 PROCEDURE — 95923 AUTONOMIC NRV SYST FUNJ TEST: CPT | Performed by: STUDENT IN AN ORGANIZED HEALTH CARE EDUCATION/TRAINING PROGRAM

## 2024-07-31 PROCEDURE — 95924 ANS PARASYMP & SYMP W/TILT: CPT | Performed by: STUDENT IN AN ORGANIZED HEALTH CARE EDUCATION/TRAINING PROGRAM

## 2024-08-07 ENCOUNTER — APPOINTMENT (OUTPATIENT)
Dept: NEUROLOGY | Facility: CLINIC | Age: 69
End: 2024-08-07
Payer: MEDICARE

## 2024-08-12 DIAGNOSIS — I10 BENIGN ESSENTIAL HYPERTENSION: Primary | ICD-10-CM

## 2024-08-12 RX ORDER — AMLODIPINE BESYLATE 10 MG/1
10 TABLET ORAL DAILY
Qty: 90 TABLET | Refills: 3 | Status: SHIPPED | OUTPATIENT
Start: 2024-08-12

## 2024-08-13 ENCOUNTER — LAB (OUTPATIENT)
Dept: LAB | Facility: LAB | Age: 69
End: 2024-08-13
Payer: MEDICARE

## 2024-08-13 DIAGNOSIS — K90.9 DIARRHEA DUE TO MALABSORPTION (HHS-HCC): ICD-10-CM

## 2024-08-13 DIAGNOSIS — R19.7 DIARRHEA DUE TO MALABSORPTION (HHS-HCC): ICD-10-CM

## 2024-08-13 DIAGNOSIS — R10.84 GENERALIZED ABDOMINAL PAIN: Primary | ICD-10-CM

## 2024-08-13 DIAGNOSIS — R10.84 GENERALIZED ABDOMINAL PAIN: ICD-10-CM

## 2024-08-13 PROCEDURE — 86316 IMMUNOASSAY TUMOR OTHER: CPT

## 2024-08-13 PROCEDURE — 36415 COLL VENOUS BLD VENIPUNCTURE: CPT

## 2024-08-19 ENCOUNTER — LAB (OUTPATIENT)
Dept: LAB | Facility: LAB | Age: 69
End: 2024-08-19
Payer: MEDICARE

## 2024-08-19 DIAGNOSIS — R19.7 DIARRHEA DUE TO MALABSORPTION (HHS-HCC): ICD-10-CM

## 2024-08-19 DIAGNOSIS — K90.9 DIARRHEA DUE TO MALABSORPTION (HHS-HCC): ICD-10-CM

## 2024-08-19 DIAGNOSIS — R10.84 GENERALIZED ABDOMINAL PAIN: ICD-10-CM

## 2024-08-19 PROCEDURE — 83497 ASSAY OF 5-HIAA: CPT

## 2024-08-21 ENCOUNTER — TELEMEDICINE CLINICAL SUPPORT (OUTPATIENT)
Dept: NUTRITION | Facility: CLINIC | Age: 69
End: 2024-08-21
Payer: MEDICARE

## 2024-08-21 DIAGNOSIS — R73.03 PRE-DIABETES: Primary | ICD-10-CM

## 2024-08-21 DIAGNOSIS — E66.1 CLASS 2 DRUG-INDUCED OBESITY WITH BODY MASS INDEX (BMI) OF 35.0 TO 35.9 IN ADULT, UNSPECIFIED WHETHER SERIOUS COMORBIDITY PRESENT: ICD-10-CM

## 2024-08-21 PROCEDURE — 97803 MED NUTRITION INDIV SUBSEQ: CPT | Performed by: DIETITIAN, REGISTERED

## 2024-08-21 NOTE — PROGRESS NOTES
Reason for Nutrition Visit:  Pt is a 69 y.o. female being seen at [facility]. Pt was referred by Marlee Angela, effective 6/10/24.     Small intestinal bacterial overgrowth  Referral to Nutrition Services        2. Other fatigue          Past Medical Hx:  Patient Active Problem List   Diagnosis    Abnormal brain MRI    Abnormal finding in urine    Abnormal intestinal absorption (HHS-HCC)    Abnormality of gait    Achilles tendinitis of right lower extremity    Adjustment disorder with depressed mood    Arthritis of first MTP joint    Allopurinol adverse reaction    Asthma (HHS-HCC)    Benign essential hypertension    Benign positional vertigo    Dry eyes, bilateral    Carotidynia    Cervical spinal stenosis    Cervical spondylosis without myelopathy    Choroidal nevus of left eye    Chronic pain    Chronic pain of both shoulders    Stage 3b chronic kidney disease (Multi)    Combined form of age-related cataract, right eye    Compression fracture of L1 lumbar vertebra (Multi)    Dizziness    Dyslipidemia    Essential tremor    Fatigue    Fracture of shaft of ulna    Functional bowel disease    Gastroparesis    Gout    H/O diplopia    History of bariatric surgery    HLD (hyperlipidemia)    HTN (hypertension)    Hypercalcemia    Hypertrophy of breast    Hypoglycemia    Hypothyroidism    Infected prosthetic knee joint (CMS-HCC)    Insomnia with sleep apnea    Intracranial arachnoid cyst    Irritable bowel syndrome with diarrhea    Lumbar radiculitis    Lumbosacral disc herniation    Macular drusen    Mild renal insufficiency    Myalgia    Myopia of left eye    Nevus of iris of right eye    Arthritis    Esophageal dysphagia    Gastroesophageal reflux disease    Hernia, hiatal    Gastritis    Nonsurgical dumping syndrome    Radial styloid tenosynovitis    Shoulder arthritis    Tremor of right hand    Orthostatic lightheadedness    Osteopenia    Osteoporosis    Other bursal cyst, unspecified hand    Overweight    Palpitations     Periorbital edema of right eye    Peripheral neuropathy    Pre-diabetes    Retinal hemorrhage, left eye    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    CABRERA (dyspnea on exertion)    Shortness of breath at rest    Malabsorption (HHS-HCC)    History of replacement of both shoulder joints    Syncope    Vision changes    Visual disturbance    Vitamin B12 deficiency    Vitamin D insufficiency    Weight loss    Second degree burn of right hand    Burn, first degree    Class 2 drug-induced obesity with body mass index (BMI) of 35.0 to 35.9 in adult    Early dry stage nonexudative age-related macular degeneration of both eyes    Combined form of age-related cataract, left eye    Hypertensive retinopathy of both eyes    Presbyopia        Current Outpatient Medications:     amLODIPine (Norvasc) 10 mg tablet, Take 1 tablet (10 mg) by mouth once daily., Disp: 90 tablet, Rfl: 3    atorvastatin (Lipitor) 10 mg tablet, TAKE 1 TABLET BY MOUTH EVERY DAY, Disp: 90 tablet, Rfl: 3    calcium carbonate (Tums) 200 mg calcium chewable tablet, Chew., Disp: , Rfl:     levothyroxine (Synthroid, Levoxyl) 75 mcg tablet, TAKE 1 TABLET BY MOUTH EVERY DAY, Disp: 90 tablet, Rfl: 3    multivitamin tablet, Take 1 tablet by mouth once daily., Disp: , Rfl:     ondansetron (Zofran) 4 mg tablet, Take by mouth., Disp: , Rfl:      Anthropometrics:      Weight change:  - 10 lbs and this 5%  Significant Weight Change: No  2017 Nissen Fundoplication, Gastroplasty (sleeve gastrectomy) and Esophogeal Lengthened  Fundoplication 2002 this was reversed   5/2024 199 lbs   08/2024 Weight: 195 lbs     Lab Results   Component Value Date    HGBA1C 5.7 (H) 03/18/2024    CHOL 190 09/19/2023    LDLF 91 09/19/2023    TRIG 72 09/19/2023    HDL 84.6 09/19/2023        Chemistry    Lab Results   Component Value Date/Time     03/18/2024 1228    K 4.7 03/18/2024 1228     03/18/2024 1228    CO2 26 03/18/2024 1228    BUN 20 03/18/2024 1228    CREATININE 1.22  (H) 03/18/2024 1228    Lab Results   Component Value Date/Time    CALCIUM 9.6 03/18/2024 1228    ALKPHOS 103 03/18/2024 1228    AST 17 03/18/2024 1228    ALT 15 03/18/2024 1228    BILITOT 0.5 03/18/2024 1228         Lab Results   Component Value Date    AXWSJRCB58 221 03/18/2024      MAGNESIUM  Magnesium  Collected: 03/18/24 1228   Result status: Final   Resulting lab: [facility]   Reference range: 1.60 - 2.40 mg/dL   Value: 2.46 High      PTH   Date Value Ref Range Status   09/19/2023 57.9 18.5 - 88.0 pg/mL Final       Lab Results   Component Value Date    WBC 6.5 03/18/2024    HGB 14.9 03/18/2024    HCT 47.5 (H) 03/18/2024    MCV 87 03/18/2024     03/18/2024      Food and Nutrition Hx:  This pt present with a complex GI hx with many current symptoms. She appears very unsettled and tired of feeling that way that is she. She wanted to share her story as she has been dealing with this for the past 25 years.  In 2017 Nissen Fundoplication, Gastroplasty (sleeve gastrectomy) and Esophogeal Lengthened. In 2002, Fundoplication this was reversed. She has gastroparesis. She states she received nutrition supports for some time.  She reports past providers believed she had an eating disorder. She denies  dumping. Pt states she has been currently dealing with SIBO. She has been on 3 rounds of anti-biotics. Pt states she feels like something else is happening. She notes gastric dysmotility related to the SIBO. Right after this appointment, she also had a fungal infection and started an anti-fungal medication.     She can have excessive gas, burping, tightness in the belly. She states she has bloating. She will then have running noise and water eyes. She then will have bowel movements with type 5-7 with stools with up to 10 times a day. When this happens, she loses functional being. Last week she through up on chicken and bread.     She is on Jardiance. She has kidney decline.     She can have eggs cooked. She can have  white fish.   She can have fear of eating in social situations.     Pt had a follow-up appointment. SIBO and candida have been cleared but now other issues are popping up.   She still has slow digestion. She can only eat so much before it comes up (emesis)  or goes out the other end (diarrhea).  She has been trying to consume small amounts of food at one time.   She is being worked up for insulin issues. Pt describes sometimes after eating she reports symptoms of ketoacidosis with tachycardia and dizziness. She then will have significant diarrhea. This can go on for hours with no relief. Its like she has hypoglycemia and then dehydration with loss of electrolytes. She is trying to obtain a CGM but endo or PCP needs to order.   It was recommended for liquid IV. She can not palette it.  She started MVI and probiotic.     24 Diet Recall:  Meal 1: Breakfast yesterday was 2 eggs with reid and toast; She reported symptoms of ketoacidosis and then diarrhea.   Meal 2:     Meal 3:  Snacks:  Beverages: 12 ounces of water and 24 ounces of black tea with lemon and 1 -2 T of honey (kcal 120). She does not tolerate water.   She can have dizziness upon standing. She can dark urine.   Total calories consumed is ~670 calories and this provides only 56% of kcal needs.     Allergies: None  Intolerance: Lactose and Gluten  Appetite: Fluctuates  Intake: <25%  GI Symptoms : diarrhea, increased gas, bloating, and see above   Frequency: frequent  Swallowing Difficulty: No problems with swallowing  Dentition : own    Types of Activities: Mostly Sedentary    Sleep duration/quality : 5-6 hours and disrupted sleep    Supplements: Multivitamin gummy with B-12 daily. Pt is not taking magnesium. Pt started a better multi-vitamin and a probiotic.       Nutrition Focused Physical Exam:  Not performed this time due virtual apt.     Malnutrition Present: Moderate Malnutrition    Estimated Energy Needs:  Energy Needs  Calculated Energy Needs Using  "Equations  Height: 154.9 cm (5' 0.98\")  Weight Used for Equation Calculations: 85.7 kg (189 lb)  Won Hodge Equation (Overweight or Obese Patients): 1314  Equation Chosen to Use by RD: Maria Antonia Goncalves  Activity Factor: 1.2  Total Energy Needs: 1576  Estimated Energy Needs  Total Energy Estimated Needs (kCal): 1200 kCal     Nutrition Diagnosis:    Diagnosis Statement 1:  Diagnosis Status: Ongoing   Diagnosis : Malnutrition; moderate chronic disease or condition related related to  multiple GI issues that has limited oral intake, increased losses and increased nutrient needs  as evidenced by  oral intake at 25% coupled with muscle and subtaneous fat loss as identified by the NFPE.     Diagnosis Statement 2:  Diagnosis Status: Ongoing   Diagnosis : Altered GI function  related to alteration in gastrointestinal tract structure and/or function  that has influenced oral intake as evidenced by  hx of sleeve gastrectomy with recent SIBO and fungal infection.     Diagnosis Statement 3:   Diagnosis Status: Ongoing   Diagnosis : Inadequate protein-energy intake  related to alteration in gastrointestinal tract structure and/or function as evidenced by  protein and kcal intake is not even at 50% of needs.     Nutrition Interventions:  Medical nutrition therapy was given for SIBO and other GI issues.   Nutrition Counseling: Motivational Interviewing and CBT  Coordination of Care: Pt does not qualify for coverage for an ONS due to Medicare.     Nutrition Recommendation:     1,600 calories per day to help to nourish the body. Oral intake greater then 75% of needs. Improve GI symptoms by selecting foods to minimize symptoms related to IBS, hx of gastric surgery reported to be reversed with malabsorption tendencies and mobility dysfunction and now both bacterial and fungal infection. Adequate and modest protein intake of 60 grams per day. Meal plan to first address malnutrition and GI function issues and then address CKD, " pre-diabetes, osteoporosis and heart health. Initial focus is on reducing occurrence of low blood glucose and osmotic diarrheal events through a more consistent intake coupled with use of an oral hydration salt soluion.     Nutrition Goals:   Via teach back method patient verbalized understanding of the following topics:  1) Strive to consume something with protein within 2 hours of waking and then eat 2 other times within the day. Establishing eating at least 3 times a day can help with metabolism and processing of nutrients within the body and is the foundation of what is needed.   2) Try to follow a BRAT diet.   3) Aim to eat 3 times a day with aim for both a carbohydrate from the brat diet such as banana, rice, applesauce or toast and pair with a protein such as egg, egg white, cheese, peanut butter, chicken, etc. For instance consider having a egg with apple sauce, banana with peanut butter and chicken with rice at meals. Strive to consume the protein first then the carbohydrates to help minimize the potential for excessive insulin production.   4) Lets try to consume a homemade oral rehydration salt solution. Oral rehydration solution (ORS) is a combination of water, glucose, sodium, and potassium that helps treat moderate dehydration by optimizing the absorption of fluids in the intestines. Below are a few homemade recipes. This should be consumed anytime diarrhea occurs. If blood glucose is dropping, a more concentrated carbohydrate is going to be needed to correct it.     Gatorade Base  Grape or Cranberry juice  Apple juice  2 cups Gatorade  ½ cups juice    1 cup apple juice  2 cups water   3 ½ cups water   3 cups water  ½ tsp salt   ½ tsp salt    ½ tsp salt    Educational Handouts: none  FODMAP (could have given the candida handout at time of appt but did not find out until after the appt)       Tanya Meyer, MS, RDN, LD, FAND   Advanced Practice Clinical  Dietitian  [email or web address]  Schedule line [phone]  Direct line [phone]     Readiness to Change : Good  Level of Understanding : Good  Anticipated Compliant : Good

## 2024-08-22 LAB
5OH-INDOLEACETATE 24H UR-MCNC: 3.2 MG/L
5OH-INDOLEACETATE 24H UR-MRATE: 8.3 MG/24 HR (ref 0–14.9)

## 2024-08-26 LAB — CHROMOGRANIN A, LC/MS/MS: 679 NG/ML

## 2024-08-27 DIAGNOSIS — R50.9 FEVER, UNSPECIFIED: ICD-10-CM

## 2024-08-27 DIAGNOSIS — R79.9 ABNORMAL BLOOD FINDING: Primary | ICD-10-CM

## 2024-08-27 DIAGNOSIS — R19.7 DIARRHEA, UNSPECIFIED TYPE: ICD-10-CM

## 2024-08-30 ENCOUNTER — TELEPHONE (OUTPATIENT)
Dept: NEUROLOGY | Facility: CLINIC | Age: 69
End: 2024-08-30
Payer: MEDICARE

## 2024-08-30 DIAGNOSIS — R55 POSTURAL DIZZINESS WITH PRESYNCOPE: Primary | ICD-10-CM

## 2024-08-30 DIAGNOSIS — R42 POSTURAL DIZZINESS WITH PRESYNCOPE: Primary | ICD-10-CM

## 2024-08-30 RX ORDER — ACARBOSE 50 MG/1
50 TABLET ORAL
Qty: 90 TABLET | Refills: 11 | Status: SHIPPED | OUTPATIENT
Start: 2024-08-30 | End: 2025-08-30

## 2024-08-30 NOTE — TELEPHONE ENCOUNTER
Discussed management with patient.  So far I've been unsuccessful in finding GI to take on this case.  I plan to refer to [facility], which has specialists to treat post-prandial hypotension.  Meanwhile, I advised the patient to eat up to 5 small meals daily, limiting carbohydrates as best possible, and to drink water with each meal to reduce likelihood of hypotension.  I will also start low-dose acarbose at 50 mg qam. (Has some gastroparesis in the past so will avoid higher dosing for now)  To still avoid taking amlodipine before meals.  Pt indicates understanding of plan.

## 2024-09-03 ENCOUNTER — LAB (OUTPATIENT)
Dept: LAB | Facility: LAB | Age: 69
End: 2024-09-03
Payer: MEDICARE

## 2024-09-03 ENCOUNTER — ANCILLARY PROCEDURE (OUTPATIENT)
Dept: CARDIOLOGY | Facility: CLINIC | Age: 69
End: 2024-09-03
Payer: MEDICARE

## 2024-09-03 ENCOUNTER — TELEPHONE (OUTPATIENT)
Dept: PRIMARY CARE | Facility: CLINIC | Age: 69
End: 2024-09-03

## 2024-09-03 DIAGNOSIS — R40.0 DAYTIME SOMNOLENCE: ICD-10-CM

## 2024-09-03 DIAGNOSIS — I10 BENIGN ESSENTIAL HYPERTENSION: ICD-10-CM

## 2024-09-03 DIAGNOSIS — R06.09 DOE (DYSPNEA ON EXERTION): ICD-10-CM

## 2024-09-03 DIAGNOSIS — R00.2 PALPITATIONS: ICD-10-CM

## 2024-09-03 DIAGNOSIS — I50.33 ACUTE ON CHRONIC DIASTOLIC HEART FAILURE (MULTI): ICD-10-CM

## 2024-09-03 DIAGNOSIS — I10 PRIMARY HYPERTENSION: ICD-10-CM

## 2024-09-03 DIAGNOSIS — E78.00 PURE HYPERCHOLESTEROLEMIA: ICD-10-CM

## 2024-09-03 DIAGNOSIS — E78.5 DYSLIPIDEMIA: ICD-10-CM

## 2024-09-03 LAB
ALBUMIN SERPL BCP-MCNC: 4.4 G/DL (ref 3.4–5)
ANION GAP SERPL CALC-SCNC: 13 MMOL/L (ref 10–20)
AORTIC VALVE MEAN GRADIENT: 4.7 MMHG
AORTIC VALVE PEAK VELOCITY: 1.49 M/S
AV PEAK GRADIENT: 8.8 MMHG
AVA (PEAK VEL): 1.83 CM2
AVA (VTI): 2.09 CM2
BNP SERPL-MCNC: 61 PG/ML (ref 0–99)
BUN SERPL-MCNC: 23 MG/DL (ref 6–23)
CALCIUM SERPL-MCNC: 10 MG/DL (ref 8.6–10.6)
CHLORIDE SERPL-SCNC: 106 MMOL/L (ref 98–107)
CO2 SERPL-SCNC: 29 MMOL/L (ref 21–32)
CREAT SERPL-MCNC: 1.12 MG/DL (ref 0.5–1.05)
EGFRCR SERPLBLD CKD-EPI 2021: 53 ML/MIN/1.73M*2
EJECTION FRACTION APICAL 4 CHAMBER: 63.8
EJECTION FRACTION: 61 %
GLUCOSE SERPL-MCNC: 89 MG/DL (ref 74–99)
LEFT ATRIUM VOLUME AREA LENGTH INDEX BSA: 28.3 ML/M2
LEFT VENTRICLE INTERNAL DIMENSION DIASTOLE: 3.56 CM (ref 3.5–6)
LEFT VENTRICULAR OUTFLOW TRACT DIAMETER: 1.95 CM
MITRAL VALVE E/A RATIO: 0.67
PHOSPHATE SERPL-MCNC: 4.2 MG/DL (ref 2.5–4.9)
POTASSIUM SERPL-SCNC: 5.4 MMOL/L (ref 3.5–5.3)
RIGHT VENTRICLE FREE WALL PEAK S': 14 CM/S
SODIUM SERPL-SCNC: 143 MMOL/L (ref 136–145)
TRICUSPID ANNULAR PLANE SYSTOLIC EXCURSION: 2.2 CM

## 2024-09-03 PROCEDURE — 83880 ASSAY OF NATRIURETIC PEPTIDE: CPT

## 2024-09-03 PROCEDURE — 93306 TTE W/DOPPLER COMPLETE: CPT | Performed by: STUDENT IN AN ORGANIZED HEALTH CARE EDUCATION/TRAINING PROGRAM

## 2024-09-03 PROCEDURE — 80069 RENAL FUNCTION PANEL: CPT

## 2024-09-03 PROCEDURE — 36415 COLL VENOUS BLD VENIPUNCTURE: CPT

## 2024-09-03 PROCEDURE — 93306 TTE W/DOPPLER COMPLETE: CPT

## 2024-09-03 NOTE — TELEPHONE ENCOUNTER
Pt said GI could not order CGM and they want her to have it to rule out insulinoma.  GI referred her to you to order it.  Would also need appt to give sample & education on how to use FreeStyle Sophy.   Advise?

## 2024-09-04 ENCOUNTER — HOSPITAL ENCOUNTER (OUTPATIENT)
Dept: RADIOLOGY | Facility: HOSPITAL | Age: 69
Discharge: HOME | End: 2024-09-04
Payer: MEDICARE

## 2024-09-04 DIAGNOSIS — R79.9 ABNORMAL BLOOD FINDING: ICD-10-CM

## 2024-09-04 DIAGNOSIS — R19.7 DIARRHEA, UNSPECIFIED TYPE: ICD-10-CM

## 2024-09-04 DIAGNOSIS — R50.9 FEVER, UNSPECIFIED: ICD-10-CM

## 2024-09-04 DIAGNOSIS — E16.2 HYPOGLYCEMIA: Primary | ICD-10-CM

## 2024-09-04 PROCEDURE — 78815 PET IMAGE W/CT SKULL-THIGH: CPT | Mod: PET TUMOR INIT TX STRAT | Performed by: NUCLEAR MEDICINE

## 2024-09-04 PROCEDURE — A9587 GALLIUM GA-68: HCPCS | Performed by: NURSE PRACTITIONER

## 2024-09-04 PROCEDURE — 78815 PET IMAGE W/CT SKULL-THIGH: CPT | Mod: PI

## 2024-09-04 PROCEDURE — 3430000001 HC RX 343 DIAGNOSTIC RADIOPHARMACEUTICALS: Performed by: NURSE PRACTITIONER

## 2024-09-04 RX ORDER — BLOOD-GLUCOSE SENSOR
1 EACH MISCELLANEOUS DAILY
Qty: 1 EACH | Refills: 1 | Status: SHIPPED | OUTPATIENT
Start: 2024-09-04

## 2024-09-04 RX ORDER — BLOOD-GLUCOSE,RECEIVER,CONT
EACH MISCELLANEOUS
Qty: 1 EACH | Refills: 0 | Status: SHIPPED | OUTPATIENT
Start: 2024-09-04

## 2024-09-05 ENCOUNTER — APPOINTMENT (OUTPATIENT)
Dept: PRIMARY CARE | Facility: CLINIC | Age: 69
End: 2024-09-05
Payer: MEDICARE

## 2024-09-05 DIAGNOSIS — K31.9 SUBEPITHELIAL LESION OF DUODENUM: Primary | ICD-10-CM

## 2024-09-06 ENCOUNTER — PATIENT MESSAGE (OUTPATIENT)
Dept: PRIMARY CARE | Facility: CLINIC | Age: 69
End: 2024-09-06
Payer: MEDICARE

## 2024-09-06 DIAGNOSIS — R11.0 NAUSEA: Primary | ICD-10-CM

## 2024-09-09 ENCOUNTER — TELEPHONE (OUTPATIENT)
Dept: PRIMARY CARE | Facility: CLINIC | Age: 69
End: 2024-09-09
Payer: MEDICARE

## 2024-09-09 DIAGNOSIS — J06.9 VIRAL UPPER RESPIRATORY TRACT INFECTION: Primary | ICD-10-CM

## 2024-09-09 DIAGNOSIS — R11.2 NAUSEA AND VOMITING, UNSPECIFIED VOMITING TYPE: Primary | ICD-10-CM

## 2024-09-09 RX ORDER — ONDANSETRON 4 MG/1
8 TABLET, FILM COATED ORAL 2 TIMES DAILY PRN
Qty: 20 TABLET | Refills: 3 | Status: SHIPPED | OUTPATIENT
Start: 2024-09-09

## 2024-09-09 RX ORDER — ONDANSETRON 4 MG/1
4 TABLET, FILM COATED ORAL EVERY 8 HOURS PRN
Qty: 30 TABLET | Refills: 2 | Status: SHIPPED | OUTPATIENT
Start: 2024-09-09

## 2024-09-09 RX ORDER — BENZONATATE 100 MG/1
100 CAPSULE ORAL 3 TIMES DAILY PRN
Qty: 21 CAPSULE | Refills: 0 | Status: SHIPPED | OUTPATIENT
Start: 2024-09-09 | End: 2024-09-16

## 2024-09-11 ENCOUNTER — APPOINTMENT (OUTPATIENT)
Dept: CARDIOLOGY | Facility: CLINIC | Age: 69
End: 2024-09-11
Payer: MEDICARE

## 2024-09-11 VITALS
HEIGHT: 61 IN | WEIGHT: 195 LBS | DIASTOLIC BLOOD PRESSURE: 68 MMHG | BODY MASS INDEX: 36.82 KG/M2 | OXYGEN SATURATION: 98 % | SYSTOLIC BLOOD PRESSURE: 134 MMHG | HEART RATE: 68 BPM

## 2024-09-11 DIAGNOSIS — I50.33 ACUTE ON CHRONIC DIASTOLIC HEART FAILURE (MULTI): ICD-10-CM

## 2024-09-11 DIAGNOSIS — R06.09 DOE (DYSPNEA ON EXERTION): Primary | ICD-10-CM

## 2024-09-11 DIAGNOSIS — E78.5 DYSLIPIDEMIA: ICD-10-CM

## 2024-09-11 DIAGNOSIS — R40.0 DAYTIME SOMNOLENCE: ICD-10-CM

## 2024-09-11 DIAGNOSIS — E78.00 PURE HYPERCHOLESTEROLEMIA: ICD-10-CM

## 2024-09-11 DIAGNOSIS — R00.2 PALPITATIONS: ICD-10-CM

## 2024-09-11 DIAGNOSIS — I10 BENIGN ESSENTIAL HYPERTENSION: ICD-10-CM

## 2024-09-11 DIAGNOSIS — I10 PRIMARY HYPERTENSION: ICD-10-CM

## 2024-09-11 PROCEDURE — 3008F BODY MASS INDEX DOCD: CPT | Performed by: STUDENT IN AN ORGANIZED HEALTH CARE EDUCATION/TRAINING PROGRAM

## 2024-09-11 PROCEDURE — 3078F DIAST BP <80 MM HG: CPT | Performed by: STUDENT IN AN ORGANIZED HEALTH CARE EDUCATION/TRAINING PROGRAM

## 2024-09-11 PROCEDURE — 99215 OFFICE O/P EST HI 40 MIN: CPT | Performed by: STUDENT IN AN ORGANIZED HEALTH CARE EDUCATION/TRAINING PROGRAM

## 2024-09-11 PROCEDURE — 1157F ADVNC CARE PLAN IN RCRD: CPT | Performed by: STUDENT IN AN ORGANIZED HEALTH CARE EDUCATION/TRAINING PROGRAM

## 2024-09-11 PROCEDURE — 1036F TOBACCO NON-USER: CPT | Performed by: STUDENT IN AN ORGANIZED HEALTH CARE EDUCATION/TRAINING PROGRAM

## 2024-09-11 PROCEDURE — 3075F SYST BP GE 130 - 139MM HG: CPT | Performed by: STUDENT IN AN ORGANIZED HEALTH CARE EDUCATION/TRAINING PROGRAM

## 2024-09-11 PROCEDURE — 1159F MED LIST DOCD IN RCRD: CPT | Performed by: STUDENT IN AN ORGANIZED HEALTH CARE EDUCATION/TRAINING PROGRAM

## 2024-09-11 ASSESSMENT — ENCOUNTER SYMPTOMS: OCCASIONAL FEELINGS OF UNSTEADINESS: 0

## 2024-09-11 NOTE — PATIENT INSTRUCTIONS
Please continue current cardiac medication including amlodipine 10 mg daily, atorvastatin 10 mg daily, Jardiance 10 mg daily.    Please followup with me in Cardiology clinic within the next 7-8 months.  Please return to clinic sooner or seek emergent care if your symptoms reoccur or worsen.

## 2024-09-11 NOTE — PROGRESS NOTES
"Follow-up (Recent Echo)     HPI:    Marissa Mosquera \"Harman or Ms Mosquera\" is a 69 y.o. female with pertinent history of premature coronary artery disease and stroke, allergic rhinitis, status post bariatric surgery, hypertension, cervical myelopathy and radiculopathy, gout, hypertension, vitamin D deficiency, no significant arrhythmias on event monitor performed June 2018 in September 2022, no obstructive carotid disease on Dopplers performed 10/5/2022, suspicion for a primary neuroendocrine lesion near the duodenum a nuclear pet imaging performed 9/5/2024, CT calcium score of 1 performed 11/4/2022, likely component of acute on chronic diastolic heart failure with BNP elevation of 311 8/29/2022, preserved LVEF of 61% with normal size left atrium on echocardiogram performed 9/3/2024 presents to cardiology clinic for follow-up.    She is doing relatively well.  Lower extremity edema has largely resolved.  No significant chest discomfort.  She can complete greater than 6 METS of activity without anginal equivalent.  Dyspnea exertion is stable.  We reviewed and discussed her recent echocardiogram and BNP.  Of note her BNP level has come down to the normal range.  She was recently diagnosed with a possible neuroendocrine tumor will require further evaluation and possible resection.  No exacerbating or relieving factors.  Patient denies chest pain and angina.  Pt denies orthopnea, and paroxysmal nocturnal dyspnea.  Pt denies worsening lower extremity edema.  Pt denies syncope.  No recent falls.  No fever or chills.  No cough.  No change in bowel or bladder habits.  No sick contacts.  No recent travel.    12 point review of systems including (Constitutional, Eyes, ENMT, Respiratory, Cardiac, Gastrointestinal, Neurological, Psychiatric, and Hematologic) was performed and is otherwise negative.    Past medical history reviewed:   has a past medical history of Anesthesia of skin (04/20/2015), Arthritis (2008), Cataract " (2018), Chronic kidney disease (2023), Conjunctival cysts, left eye (08/14/2018), Disease of thyroid gland (1985), GERD (gastroesophageal reflux disease) (1976), Hypertension (2015), Hypertensive retinopathy, bilateral (08/20/2019), Other conditions influencing health status (02/15/2017), Peptic ulceration (2023), Personal history of diseases of the skin and subcutaneous tissue (11/01/2018), Personal history of other diseases of the circulatory system (11/01/2018), Personal history of other specified conditions (10/31/2018), Personal history of other specified conditions (07/27/2015), Retinal hemorrhage, left eye (02/13/2018), Varicella (1957), and Visual impairment (1963).    Past surgical history reviewed:   has a past surgical history that includes Other surgical history (07/17/2017); Other surgical history (07/17/2017); Other surgical history (07/17/2017); Other surgical history (07/17/2017); Other surgical history (12/18/2017); Other surgical history (03/05/2019); Gastric fundoplication (06/29/2017); Colonoscopy (06/08/2016); Other surgical history (04/22/2015); Other surgical history (04/22/2015); Total knee arthroplasty (04/22/2015); Other surgical history (04/22/2015); Other surgical history (04/22/2015); CT angio head w and wo IV contrast (06/28/2018); CT angio neck (06/28/2018); MR angio head wo IV contrast (09/29/2021); CT angio head w and wo IV contrast (08/29/2022); CT angio neck (08/29/2022); Breast surgery (2007); Fracture surgery (1980); Joint replacement (2011, 2021,2022); Spine surgery (2019); Small intestine surgery (11/2016); and Breast biopsy.    Social history reviewed:   reports that she has never smoked. She has never used smokeless tobacco. She reports current alcohol use. She reports that she does not use drugs.     Family history reviewed:    Family History   Problem Relation Name Age of Onset    Heart failure Mother Elisabet     Hypertension Mother Elisabet     Arthritis Mother Elisabet      Depression Mother Elisabet     Early natural death Mother Elisabet     Stroke Mother Elisabet     Heart failure Father Vernon     Heart disease Father Vernon     Other (Sinus problem) Brother      Cancer Other Grandfather     Cancer Maternal Grandfather Jose     Early natural death Maternal Grandfather Jose     Stroke Maternal Grandfather Jose     Alcohol abuse Brother Mario     Diabetes Brother Mario     Hypertension Brother Mario     Alcohol abuse Brother Junior     Drug abuse Brother Junior     Stroke Mother's Sister Katharine    Multiple family members with premature CVA.  She also notes that multiple family members have had hemorrhagic strokes on antiplatelets or anticoagulants.    Allergies reviewed: Patient has no known allergies.     Medications reviewed:   Current Outpatient Medications   Medication Instructions    acarbose (PRECOSE) 50 mg, oral, 3 times daily (morning, midday, late afternoon), Take with meals    amLODIPine (NORVASC) 10 mg, oral, Daily    atorvastatin (LIPITOR) 10 mg, oral, Daily    benzonatate (TESSALON) 100 mg, oral, 3 times daily PRN, Do not crush or chew.    calcium carbonate (Tums) 200 mg calcium chewable tablet oral    FreeStyle Sophy 3 [place] misc Use as instructed    FreeStyle Sophy 3 Sensor device 1 kit, miscellaneous, Daily    levothyroxine (SYNTHROID, LEVOXYL) 75 mcg, oral, Daily    multivitamin tablet 1 tablet, oral, Daily    ondansetron (ZOFRAN) 8 mg, oral, 2 times daily PRN    ondansetron (ZOFRAN) 4 mg, oral, Every 8 hours PRN        Vitals reviewed: Visit Vitals  /68   Pulse 68       Physical Exam:   General:  Patient is awake, alert, and oriented.  Patient is in no acute distress.  HEENT:  Pupils equal and reactive.  Normocephalic.  Moist mucosa.    Neck:  No thyromegaly.  Normal Jugular Venous Pressure.  Cardiovascular:  Regular rate and rhythm.  Normal S1 and S2.  1/6 DORIS.  Pulmonary:  Clear to auscultation bilaterally.  Abdomen:  Soft. Non-tender.   Non-distended.  Positive  "bowel sounds.  Lower Extremities:  2+ pedal pulses.  Trace to 1+ LE edema.  Neurologic:  Cranial nerves intact.  No focal deficit.   Skin: Skin warm and dry, normal skin turgor.   Psychiatric: Normal affect.    Last Labs:  CBC -      Lab Results   Component Value Date    WBC 6.5 03/18/2024    HGB 14.9 03/18/2024    HCT 47.5 (H) 03/18/2024     03/18/2024        CMP-  Lab Results   Component Value Date    GLUCOSE 89 09/03/2024     09/03/2024    K 5.4 (H) 09/03/2024     09/03/2024    CO2 29 09/03/2024    ANIONGAP 13 09/03/2024    BUN 23 09/03/2024    CREATININE 1.12 (H) 09/03/2024    EGFR 53 (L) 09/03/2024    CALCIUM 10.0 09/03/2024    PHOS 4.2 09/03/2024    PROT 6.7 03/18/2024    ALBUMIN 4.4 09/03/2024    AST 17 03/18/2024    ALT 15 03/18/2024    ALKPHOS 103 03/18/2024    BILITOT 0.5 03/18/2024        LIPIDS-  Lab Results   Component Value Date    CHOL 190 09/19/2023    TRIG 72 09/19/2023    HDL 84.6 09/19/2023    CHHDL 2.2 09/19/2023    VLDL 14 09/19/2023        OTHERS-  Lab Results   Component Value Date    HGBA1C 5.7 (H) 03/18/2024    BNP 61 09/03/2024        I personally reviewed the patient's recent vitals, labs, medications, orders, EKGs, pertinent cardiac imaging/ echocardiography and ischemic evaluations including stress testing/ cardiac catheterization.    Assessment and Plan:    Marissa Mosquera \"Harman or Ms Mosquera\" is a 69 y.o. female with pertinent history of premature coronary artery disease and stroke, allergic rhinitis, status post bariatric surgery, hypertension, cervical myelopathy and radiculopathy, gout, hypertension, vitamin D deficiency, no significant arrhythmias on event monitor performed June 2018 in September 2022, no obstructive carotid disease on Dopplers performed 10/5/2022, suspicion for a primary neuroendocrine lesion near the duodenum a nuclear pet imaging performed 9/5/2024, CT calcium score of 1 performed 11/4/2022, likely component of acute on chronic diastolic " heart failure with BNP elevation of 311 8/29/2022, preserved LVEF of 61% with normal size left atrium on echocardiogram performed 9/3/2024 presents to cardiology clinic for follow-up.  She is doing relatively well.  Lower extremity edema has largely resolved.  No significant chest discomfort.  She can complete greater than 6 METS of activity without anginal equivalent.  Dyspnea exertion is stable.  We reviewed and discussed her recent echocardiogram and BNP.  Of note her BNP level has come down to the normal range.  She was recently diagnosed with a possible neuroendocrine tumor will require further evaluation and possible resection.      The patient is at low to intermediate risk for moderate risk surgery and can proceed.    Please continue current cardiac medication including amlodipine 10 mg daily, atorvastatin 10 mg daily, Jardiance 10 mg daily.    Please followup with me in Cardiology clinic within the next 7-8 months.  Please return to clinic sooner or seek emergent care if your symptoms reoccur or worsen.    Thank you for allowing me to participate in their care.  Please feel free to call me with any further questions or concerns.        Jose Randhawa MD, FACC, ALEC ALCOCER  Division of Cardiovascular Medicine  System Director, Nuclear Cardiology   Medical Director, [facility] [place], [facility]   Clinical , [employer]  [email or web address]   Office:  [phone]

## 2024-09-13 ENCOUNTER — APPOINTMENT (OUTPATIENT)
Dept: NUTRITION | Facility: CLINIC | Age: 69
End: 2024-09-13
Payer: MEDICARE

## 2024-09-25 ENCOUNTER — TELEPHONE (OUTPATIENT)
Dept: GASTROENTEROLOGY | Facility: HOSPITAL | Age: 69
End: 2024-09-25
Payer: MEDICARE

## 2024-09-25 RX ORDER — AMOXICILLIN 500 MG/1
CAPSULE ORAL
COMMUNITY
Start: 2024-06-18 | End: 2024-10-01 | Stop reason: WASHOUT

## 2024-09-25 RX ORDER — RESPIRATORY SYNCYTIAL VISUS VACCINE RECOMBINANT, ADJUVANTED 120MCG/0.5
KIT INTRAMUSCULAR
COMMUNITY
Start: 2023-10-02

## 2024-09-25 RX ORDER — AMOXICILLIN 500 MG/1
TABLET, FILM COATED ORAL
COMMUNITY
Start: 2024-06-17 | End: 2024-10-01 | Stop reason: WASHOUT

## 2024-09-25 RX ORDER — OMEPRAZOLE 20 MG/1
20 TABLET, DELAYED RELEASE ORAL
COMMUNITY

## 2024-09-25 NOTE — TELEPHONE ENCOUNTER
Patient called and states she has a procedure scheduled with Dr. Garcia next week and would like to see the provider in clinic or virtually to discuss the procedure. I advised the patient at this time he did not have any clinic openings and that I would send a staff message directly to Dr. Garcia to advise and that the patient is asking for a call back to [phone].

## 2024-09-27 ENCOUNTER — APPOINTMENT (OUTPATIENT)
Dept: PRIMARY CARE | Facility: CLINIC | Age: 69
End: 2024-09-27
Payer: MEDICARE

## 2024-09-30 ENCOUNTER — OFFICE VISIT (OUTPATIENT)
Dept: GASTROENTEROLOGY | Facility: HOSPITAL | Age: 69
End: 2024-09-30
Payer: MEDICARE

## 2024-09-30 VITALS
SYSTOLIC BLOOD PRESSURE: 142 MMHG | OXYGEN SATURATION: 98 % | WEIGHT: 195.8 LBS | DIASTOLIC BLOOD PRESSURE: 99 MMHG | RESPIRATION RATE: 22 BRPM | BODY MASS INDEX: 37 KG/M2 | HEART RATE: 77 BPM | TEMPERATURE: 97.1 F

## 2024-09-30 DIAGNOSIS — R93.3 ABNORMAL FINDING ON GI TRACT IMAGING: ICD-10-CM

## 2024-09-30 DIAGNOSIS — K63.8219 SMALL INTESTINAL BACTERIAL OVERGROWTH: Primary | ICD-10-CM

## 2024-09-30 PROCEDURE — 1157F ADVNC CARE PLAN IN RCRD: CPT | Performed by: INTERNAL MEDICINE

## 2024-09-30 PROCEDURE — 3077F SYST BP >= 140 MM HG: CPT | Performed by: INTERNAL MEDICINE

## 2024-09-30 PROCEDURE — 99214 OFFICE O/P EST MOD 30 MIN: CPT | Performed by: INTERNAL MEDICINE

## 2024-09-30 PROCEDURE — 1159F MED LIST DOCD IN RCRD: CPT | Performed by: INTERNAL MEDICINE

## 2024-09-30 PROCEDURE — 3080F DIAST BP >= 90 MM HG: CPT | Performed by: INTERNAL MEDICINE

## 2024-09-30 PROCEDURE — 1126F AMNT PAIN NOTED NONE PRSNT: CPT | Performed by: INTERNAL MEDICINE

## 2024-09-30 RX ORDER — BUTYROSPERMUM PARKII(SHEA BUTTER), SIMMONDSIA CHINENSIS (JOJOBA) SEED OIL, ALOE BARBADENSIS LEAF EXTRACT .01; 1; 3.5 G/100G; G/100G; G/100G
250 LIQUID TOPICAL DAILY
COMMUNITY

## 2024-09-30 ASSESSMENT — ENCOUNTER SYMPTOMS
CONSTITUTIONAL NEGATIVE: 1
ENDOCRINE NEGATIVE: 1
ROS GI COMMENTS: AS ABOVE
CARDIOVASCULAR NEGATIVE: 1
EYES NEGATIVE: 1
RESPIRATORY NEGATIVE: 1

## 2024-09-30 ASSESSMENT — PAIN SCALES - GENERAL: PAINLEVEL: 0-NO PAIN

## 2024-09-30 NOTE — PROGRESS NOTES
"Gastroenterology Clinic Note    History Of Present Illness  Marissa Mosquera \"Harman or Ms Mosquera\" is a 69 y.o. female presenting with abnormal PET CT NET NET SPOT scan.  Patient has a complex history including a vertical banded gastroplasty in 2000 as well as a hiatal hernia repair and Slava gastroplasty performed.  This was complicated by gastric dysmotility and functional dysphagia requiring PEG tube placement from 2856-5936.  She underwent surgery in November 2017 with lysis of adhesions and reversal of her vertical band gastroplasty.  Patient's last endoscopy was in March 2023 and was notable for retained food.  She also has had a gastric emptying scan consistent with gastroparesis.  She is also been treated for SIBO multiple times with rifaximin.  She also has been diagnosed with dumping syndrome.  She was found to have an elevated chromogranin a level followed by a PET scan which did reveal uptake in the duodenal wall consistent with a possible neuroendocrine tumor.  She is here in regards to this.  I have reviewed a recent note by cardiology stating she is cleared for any endoscopic procedures.     Subjective  The patient is here with her  today.  She has multiple questions and concerns about her upcoming EUS.  Using diagrams we discussed her previous imaging findings and need to further testing.  We discussed the procedure of EUS and FNA and risks associated with that.      Review of Systems  Review of Systems   Constitutional: Negative.    HENT: Negative.     Eyes: Negative.    Respiratory: Negative.     Cardiovascular: Negative.    Gastrointestinal:         As above   Endocrine: Negative.    Genitourinary: Negative.          Physical Exam  Physical Exam  Vitals and nursing note reviewed.   Constitutional:       Appearance: She is obese.   HENT:      Head: Normocephalic and atraumatic.           Last Recorded Vitals  Vitals:    09/30/24 0810   BP: (!) 142/99   Pulse: 77   Resp: 22   Temp: 36.2 °C " (97.1 °F)   SpO2: 98%        Relevant Results    Current Outpatient Medications:     acarbose (Precose) 50 mg tablet, Take 1 tablet (50 mg) by mouth 3 times daily (morning, midday, late afternoon). Take with meals (Patient not taking: Reported on 9/11/2024), Disp: 90 tablet, Rfl: 11    amLODIPine (Norvasc) 10 mg tablet, Take 1 tablet (10 mg) by mouth once daily., Disp: 90 tablet, Rfl: 3    amoxicillin (Amoxil) 500 mg capsule, Take 4 Capsules by mouth 1 hour prior to appointment time, Disp: , Rfl:     amoxicillin (Amoxil) 500 mg tablet, Take 1 Tablet by mouth 3 times daily for 7 days until gone, Disp: , Rfl:     Arexvy, PF, 120 mcg/0.5 mL suspension for reconstitution, , Disp: , Rfl:     atorvastatin (Lipitor) 10 mg tablet, TAKE 1 TABLET BY MOUTH EVERY DAY, Disp: 90 tablet, Rfl: 3    calcium carbonate (Tums) 200 mg calcium chewable tablet, Chew., Disp: , Rfl:     levothyroxine (Synthroid, Levoxyl) 75 mcg tablet, TAKE 1 TABLET BY MOUTH EVERY DAY, Disp: 90 tablet, Rfl: 3    multivitamin tablet, Take 1 tablet by mouth once daily., Disp: , Rfl:     omeprazole OTC (PriLOSEC OTC) 20 mg EC tablet, Take 1 tablet (20 mg) by mouth once daily in the morning. Take before meals. Do not crush, chew, or split., Disp: , Rfl:     ondansetron (Zofran) 4 mg tablet, Take 2 tablets (8 mg) by mouth 2 times a day as needed for nausea or vomiting., Disp: 20 tablet, Rfl: 3    ondansetron (Zofran) 4 mg tablet, Take 1 tablet (4 mg) by mouth every 8 hours if needed for nausea or vomiting., Disp: 30 tablet, Rfl: 2  No current facility-administered medications for this visit.    Facility-Administered Medications Ordered in Other Visits:     perflutren lipid microspheres (Definity) injection 0.5-10 mL of dilution, 0.5-10 mL of dilution, intravenous, Once in imaging, Jose BRIGGS MD     Lab Results   Component Value Date    WBC 6.5 03/18/2024    HGB 14.9 03/18/2024    HCT 47.5 (H) 03/18/2024    MCV 87 03/18/2024     03/18/2024     Lab  Results   Component Value Date    GLUCOSE 89 09/03/2024    CALCIUM 10.0 09/03/2024     09/03/2024    K 5.4 (H) 09/03/2024    CO2 29 09/03/2024     09/03/2024    BUN 23 09/03/2024    CREATININE 1.12 (H) 09/03/2024     Lab Results   Component Value Date    ALT 15 03/18/2024    AST 17 03/18/2024    ALKPHOS 103 03/18/2024    BILITOT 0.5 03/18/2024         Imaging:  NM PET CT NET NETSPOT;  9/4/2024 12:44 pm      INDICATION:  Signs/Symptoms:positive chromogranin A.      ,R79.9 Abnormal finding of blood chemistry, unspecified,R19.7  Diarrhea, unspecified,R50.9 Fever, unspecified      COMPARISON:  None.      ACCESSION NUMBER(S):  [id number]      ORDERING CLINICIAN:  PRINCESS CHANG      TECHNIQUE:  DIVISION OF NUCLEAR MEDICINE  POSITRON EMISSION TOMOGRAPHY (PET-CT)      The patient received an intravenous dose of 5.5 mCi of Ga-68  DOTATATE. Positron emission tomographic (PET) images from skull base  to mid-thigh were then acquired after a one hour delay. Also acquired  was a contemporaneous low dose non-contrast CT scan performed for  attenuation correction of PET images and anatomic localization.  The  PET and CT images were digitally fused for display.  All images were  acquired on a combined PET-CT scanner unit.  Some areas of FDG  accumulation may be described in standardized uptake value (SUV)  units.      CODING:  Initial treatment strategy      CALIBRATION:  Dose Injection-to-Scan Interval (mins): 57 min      FINDINGS:  NECK:  No evidence of abnormal Ga-68 dotatate uptake is seen to suggest  metastasis.      CHEST:  No evidence of abnormal Ga-68 dotatate uptake is seen in the lungs.  No abnormal lymph nodes with Ga-68 dotatate uptake seen within  axillary, hilar or mediastinal lymph nodes.      ABDOMEN AND PELVIS:      No abnormal Ga-68 Dotatate uptake within the liver.  Focal intense Ga-68 dotatate uptake within the medial aspect of the  duodenum with maximum SUV of 14.1 without definite  anatomic  correlate. Status post Adele-en-Y gastrectomy. No additional abnormal  Ga-68 Dotatate uptake within the abdomen or pelvis. There are no  lymph nodes with abnormal Ga-68 dotatate uptake. Physiologic Ga-68  dotatate uptake is seen in the liver, spleen, kidneys, adrenals and  remainder of the bowel. There is a small fat containing ventral  hernia.      MUSCULOSKELETAL:  No focus of abnormal focal Ga-68 dotatate uptake is seen to suggest  metastases.      Partially visualized metallic hardware within the proximal right  femoral diaphysis. Status post bilateral shoulder arthroplasties.  Plate and screw fixation hardware of the left clavicle is noted.  Status post anterior cervical fusion hardware.      IMPRESSION:  1. A focal Ga-68 Dotatate avid lesion within the 1st portion of the  duodenum is suspicious for a primary neuroendocrine lesion; correlate  with direct visualization as clinically indicated.  2. No evidence of distant Ga-68 Dotatate avid metastatic or jae  disease.          This study was interpreted at [facility].         ASSESSMENT/PLAN:    70 yo woman with complex history of vertical band gastroplasty, hiatal hernia and repeat surgery for lysis of adhesions.  Her post op course has been complicated by SIBO, dumping syndrome and Gastroparesis.  Recent PET NET scan shows abnormal uptake in the duodenal bulb and there is an elevated chromogranin A.    Plan  Proceed with EGD and EUS  Further plans can made after the EUS is completed    I spent 40 minutes in the professional and overall care of this patient.      Pavel Garcia MD

## 2024-09-30 NOTE — PATIENT INSTRUCTIONS
Thank you for coming in today.  We will plan on performing an endoscopic ultrasound with biopsy on Wednesday.  We can make further plans at that time depending on findings.

## 2024-10-01 NOTE — H&P
"History Of Present Illness  Marissa Mosquera \"Harman or Ms Mosquera\" is a 69 y.o. female presenting with abnormal PET scan.     Past Medical History  Past Medical History:   Diagnosis Date    Anesthesia of skin 04/20/2015    Finger numbness    Arthritis 2008    Cataract 2018    Chronic kidney disease 2023    Conjunctival cysts, left eye 08/14/2018    Conjunctival cyst of left eye    Disease of thyroid gland 1985    GERD (gastroesophageal reflux disease) 1976    Hypertension 2015    Hypertensive retinopathy, bilateral 08/20/2019    Hypertensive retinopathy of both eyes    Other conditions influencing health status 02/15/2017    Compression fracture    Peptic ulceration 2023    Personal history of diseases of the skin and subcutaneous tissue 11/01/2018    History of urticaria    Personal history of other diseases of the circulatory system 11/01/2018    History of hypertension    Personal history of other specified conditions 10/31/2018    History of diarrhea    Personal history of other specified conditions 07/27/2015    History of vertigo    Retinal hemorrhage, left eye 02/13/2018    Retinal hemorrhage of left eye    Varicella 1957    Visual impairment 1963     Surgical History  Past Surgical History:   Procedure Laterality Date    BREAST BIOPSY      rt benign core    BREAST SURGERY  2007    COLONOSCOPY  06/08/2016    Complete Colonoscopy    CT ANGIO NECK  06/28/2018    CT NECK ANGIO W AND WO IV CONTRAST 6/28/2018 [facility]    CT ANGIO NECK  08/29/2022    CT NECK ANGIO W AND WO IV CONTRAST 8/29/2022 [facility]    CT HEAD ANGIO W AND WO IV CONTRAST  06/28/2018    CT HEAD ANGIO W AND WO IV CONTRAST 6/28/2018 [facility]    CT HEAD ANGIO W AND WO IV CONTRAST  08/29/2022    CT HEAD ANGIO W AND WO IV CONTRAST 8/29/2022 [facility]ERGENCY LEGACY    FRACTURE SURGERY  1980    GASTRIC FUNDOPLICATION  06/29/2017    Esophagogastric Fundoplasty Nissen Fundoplication    JOINT REPLACEMENT  2011, 2021,2022    MR HEAD " ANGIO WO IV CONTRAST  09/29/2021    MR HEAD ANGIO WO IV CONTRAST 9/29/2021 [facility]LL[facility]    OTHER SURGICAL HISTORY  07/17/2017    Esophagogastric Fundoplasty Laparoscopic For Paraesophageal Hernia Repair    OTHER SURGICAL HISTORY  07/17/2017    Gastroplasty Longitudinal    OTHER SURGICAL HISTORY  07/17/2017    Esophageal Lengthening    OTHER SURGICAL HISTORY  07/17/2017    Repair Of Paraesophageal Hiatus Hernia    OTHER SURGICAL HISTORY  12/18/2017    Laparoscopy Repair Of Hernia    OTHER SURGICAL HISTORY  03/05/2019    Shoulder replacement    OTHER SURGICAL HISTORY  04/22/2015    Treatment Of Wrist Fracture    OTHER SURGICAL HISTORY  04/22/2015    Open Treatment Of Clavicular Fracture    OTHER SURGICAL HISTORY  04/22/2015    Breast Surgery Excision Of Breast Single Lesion    OTHER SURGICAL HISTORY  04/22/2015    Parathyroid Complete Parathyroidectomy    SMALL INTESTINE SURGERY  11/2016    SPINE SURGERY  2019    TOTAL KNEE ARTHROPLASTY  04/22/2015    Knee Replacement    TOTAL SHOULDER ARTHROPLASTY Bilateral      Social History  She reports that she has never smoked. She has never used smokeless tobacco. She reports current alcohol use. She reports that she does not use drugs.    Family History  Family History   Problem Relation Name Age of Onset    Heart failure Mother Elisabet     Hypertension Mother Elisabet     Arthritis Mother Elisabet     Depression Mother Elisabet     Early natural death Mother Elisabet     Stroke Mother Elisabet     Heart failure Father Vernon     Heart disease Father Vernon     Other (Sinus problem) Brother      Cancer Other Grandfather     Cancer Maternal Grandfather Reynaldojorge     Early natural death Maternal Grandfather Jose     Stroke Maternal Grandfather Jose     Alcohol abuse Brother Mario     Diabetes Brother Mario     Hypertension Brother Mario     Alcohol abuse Brother Junior     Drug abuse Brother Junior     Stroke Mother's Sister Katharine         Allergies  No Known Allergies  Review of Systems      Physical Exam  Vitals and nursing note reviewed.   Constitutional:       Appearance: Normal appearance.   Cardiovascular:      Rate and Rhythm: Normal rate and regular rhythm.      Pulses: Normal pulses.      Heart sounds: Normal heart sounds.   Pulmonary:      Effort: Pulmonary effort is normal.      Breath sounds: Normal breath sounds.   Abdominal:      General: Abdomen is flat.      Palpations: Abdomen is soft.   Neurological:      Mental Status: She is alert.          Last Recorded Vitals  There were no vitals taken for this visit.    Assessment/Plan   Abnormal PET scan    Proceed with EUS and EGD     Pavel Garcia MD

## 2024-10-02 ENCOUNTER — ANESTHESIA EVENT (OUTPATIENT)
Dept: GASTROENTEROLOGY | Facility: HOSPITAL | Age: 69
End: 2024-10-02
Payer: MEDICARE

## 2024-10-02 ENCOUNTER — ANESTHESIA (OUTPATIENT)
Dept: GASTROENTEROLOGY | Facility: HOSPITAL | Age: 69
End: 2024-10-02
Payer: MEDICARE

## 2024-10-02 ENCOUNTER — HOSPITAL ENCOUNTER (OUTPATIENT)
Dept: GASTROENTEROLOGY | Facility: HOSPITAL | Age: 69
Discharge: HOME | End: 2024-10-02
Payer: MEDICARE

## 2024-10-02 VITALS
OXYGEN SATURATION: 98 % | BODY MASS INDEX: 36.63 KG/M2 | SYSTOLIC BLOOD PRESSURE: 131 MMHG | HEIGHT: 61 IN | HEART RATE: 68 BPM | DIASTOLIC BLOOD PRESSURE: 77 MMHG | WEIGHT: 194 LBS | RESPIRATION RATE: 16 BRPM | TEMPERATURE: 97.2 F

## 2024-10-02 DIAGNOSIS — K31.9 SUBEPITHELIAL LESION OF DUODENUM: ICD-10-CM

## 2024-10-02 PROCEDURE — 3700000002 HC GENERAL ANESTHESIA TIME - EACH INCREMENTAL 1 MINUTE

## 2024-10-02 PROCEDURE — 2720000007 HC OR 272 NO HCPCS

## 2024-10-02 PROCEDURE — 2500000004 HC RX 250 GENERAL PHARMACY W/ HCPCS (ALT 636 FOR OP/ED): Performed by: INTERNAL MEDICINE

## 2024-10-02 PROCEDURE — 2500000004 HC RX 250 GENERAL PHARMACY W/ HCPCS (ALT 636 FOR OP/ED)

## 2024-10-02 PROCEDURE — 7100000009 HC PHASE TWO TIME - INITIAL BASE CHARGE

## 2024-10-02 PROCEDURE — 43239 EGD BIOPSY SINGLE/MULTIPLE: CPT | Performed by: INTERNAL MEDICINE

## 2024-10-02 PROCEDURE — A43237 PR ESOPHAGOGASTRODUODENOSCOPY US SCOPE W/ADJ STRXRS

## 2024-10-02 PROCEDURE — 3700000001 HC GENERAL ANESTHESIA TIME - INITIAL BASE CHARGE

## 2024-10-02 PROCEDURE — 7100000010 HC PHASE TWO TIME - EACH INCREMENTAL 1 MINUTE

## 2024-10-02 PROCEDURE — A43237 PR ESOPHAGOGASTRODUODENOSCOPY US SCOPE W/ADJ STRXRS: Performed by: ANESTHESIOLOGY

## 2024-10-02 PROCEDURE — 43259 EGD US EXAM DUODENUM/JEJUNUM: CPT | Performed by: INTERNAL MEDICINE

## 2024-10-02 RX ORDER — PROPOFOL 10 MG/ML
INJECTION, EMULSION INTRAVENOUS CONTINUOUS PRN
Status: DISCONTINUED | OUTPATIENT
Start: 2024-10-02 | End: 2024-10-02

## 2024-10-02 RX ORDER — GLYCOPYRROLATE 0.2 MG/ML
INJECTION INTRAMUSCULAR; INTRAVENOUS AS NEEDED
Status: DISCONTINUED | OUTPATIENT
Start: 2024-10-02 | End: 2024-10-02

## 2024-10-02 RX ORDER — SODIUM CHLORIDE, SODIUM LACTATE, POTASSIUM CHLORIDE, CALCIUM CHLORIDE 600; 310; 30; 20 MG/100ML; MG/100ML; MG/100ML; MG/100ML
20 INJECTION, SOLUTION INTRAVENOUS CONTINUOUS
Status: DISCONTINUED | OUTPATIENT
Start: 2024-10-02 | End: 2024-10-03 | Stop reason: HOSPADM

## 2024-10-02 RX ORDER — FENTANYL CITRATE 50 UG/ML
INJECTION, SOLUTION INTRAMUSCULAR; INTRAVENOUS AS NEEDED
Status: DISCONTINUED | OUTPATIENT
Start: 2024-10-02 | End: 2024-10-02

## 2024-10-02 RX ORDER — PHENYLEPHRINE HCL IN 0.9% NACL 1 MG/10 ML
SYRINGE (ML) INTRAVENOUS AS NEEDED
Status: DISCONTINUED | OUTPATIENT
Start: 2024-10-02 | End: 2024-10-02

## 2024-10-02 SDOH — HEALTH STABILITY: MENTAL HEALTH: CURRENT SMOKER: 0

## 2024-10-02 ASSESSMENT — PAIN SCALES - GENERAL
PAINLEVEL_OUTOF10: 0 - NO PAIN

## 2024-10-02 ASSESSMENT — COLUMBIA-SUICIDE SEVERITY RATING SCALE - C-SSRS
1. IN THE PAST MONTH, HAVE YOU WISHED YOU WERE DEAD OR WISHED YOU COULD GO TO SLEEP AND NOT WAKE UP?: NO
6. HAVE YOU EVER DONE ANYTHING, STARTED TO DO ANYTHING, OR PREPARED TO DO ANYTHING TO END YOUR LIFE?: NO
2. HAVE YOU ACTUALLY HAD ANY THOUGHTS OF KILLING YOURSELF?: NO

## 2024-10-02 ASSESSMENT — PAIN - FUNCTIONAL ASSESSMENT
PAIN_FUNCTIONAL_ASSESSMENT: 0-10

## 2024-10-02 NOTE — ANESTHESIA POSTPROCEDURE EVALUATION
"Patient: Marissa Mosquera \"Harman or Ms Mosquera\"    Procedure Summary       Date: 10/02/24 Room / Location: [facility]    Anesthesia Start: 1349 Anesthesia Stop: 1441    Procedure: ENDOSCOPIC ULTRASOUND (UPPER) Diagnosis: Subepithelial lesion of duodenum    Scheduled Providers: Pavel Garcia MD; Jay Alonso MD; APRYL Haskins-GONZALO; Violet Contreras RN; Elba Quinn RN; Panchito Cano RN Responsible Provider: Edwardo Davila MD    Anesthesia Type: general ASA Status: 3            Anesthesia Type: general    Vitals Value Taken Time   /77 10/02/24 1518   Temp 36.2 °C (97.2 °F) 10/02/24 1440   Pulse 70 10/02/24 1522   Resp 16 10/02/24 1518   SpO2 100 % 10/02/24 1522   Vitals shown include unfiled device data.    Anesthesia Post Evaluation    Patient participation: complete - patient participated  Level of consciousness: awake  Pain management: satisfactory to patient  Airway patency: patent  Cardiovascular status: acceptable and hemodynamically stable  Respiratory status: acceptable and nonlabored ventilation  Hydration status: balanced  Postoperative Nausea and Vomiting: none        There were no known notable events for this encounter.    "

## 2024-10-02 NOTE — DISCHARGE INSTRUCTIONS

## 2024-10-02 NOTE — ANESTHESIA PREPROCEDURE EVALUATION
"Patient: Marissa Mosquera \"Harman or Ms Mosquera\"    Procedure Information       Date/Time: 10/02/24 1430    Scheduled providers: Pavel Garcia MD; Jay Alonso MD; APRYL Haskins-GONZALO; Violet Contreras RN; Elba Quinn, RN; Panchito Cano RN    Procedure: ENDOSCOPIC ULTRASOUND (UPPER)    Location: [facility]            Relevant Problems   Anesthesia (within normal limits)      Cardiac   (+) Benign essential hypertension   (+) HLD (hyperlipidemia)   (+) HTN (hypertension)      Pulmonary   (+) Asthma   (+) CABRERA (dyspnea on exertion)      Neuro   (+) Carotidynia   (+) Lumbar radiculitis   (+) Peripheral neuropathy      GI   (+) Esophageal dysphagia   (+) Gastroesophageal reflux disease   (+) Hernia, hiatal   (+) Irritable bowel syndrome with diarrhea      Endocrine   (+) Class 2 drug-induced obesity with body mass index (BMI) of 35.0 to 35.9 in adult   (+) Hypothyroidism      Musculoskeletal   (+) Cervical spinal stenosis   (+) Cervical spondylosis without myelopathy   (+) Lumbosacral disc herniation      ID   (+) Infected prosthetic knee joint (CMS-HCC)       Clinical information reviewed:   Tobacco  Allergies  Meds   Med Hx  Surg Hx  OB Status  Fam Hx  Soc   Hx        NPO Detail:  NPO/Void Status  Carbohydrate Drink Given Prior to Surgery? : N  Date of Last Liquid: 10/02/24  Time of Last Liquid: 0900  Date of Last Solid: 10/01/24  Time of Last Solid: 2000  Last Intake Type: Clear fluids  Time of Last Void: 1316         Physical Exam    Airway  Mallampati: II  TM distance: >3 FB  Neck ROM: full  Comments: Patient states she is an easy airway for intubation despite cervical fusion.   Cardiovascular    Dental - normal exam     Pulmonary    Abdominal          Anesthesia Plan    History of general anesthesia?: yes  History of complications of general anesthesia?: no    ASA 3     MAC     The patient is not a current smoker.  Patient did not smoke on day of procedure.    intravenous " induction   Postoperative administration of opioids is intended.  Anesthetic plan and risks discussed with patient.    Plan discussed with CRNA and CAA.  Risks, benefits, and alternatives of monitored anesthesia care with deep sedation were explained in detail to the patient.  Specifically, the patient was told that MAC with sedation is not general anesthesia, and that memory of intraprocedural events may be possible despite the sedation drugs administered.  I explained to the patient that the role of the anesthesia team is to monitor and adjust the patient's level of sedation to provide the maximum level of comfort with keeping safety as the highest priority, but that general anesthesia with complete amnesia is not a realistic expectation.  Conversion to general anesthesia if required was also described should the procedure conditions warrant.  The patient indicated understanding and agreed to proceed.    Jay Alonso MD

## 2024-10-03 ENCOUNTER — TELEPHONE (OUTPATIENT)
Dept: GASTROENTEROLOGY | Facility: HOSPITAL | Age: 69
End: 2024-10-03
Payer: MEDICARE

## 2024-10-03 NOTE — SIGNIFICANT EVENT
Patient not feeling well post procedure.  She is complaining of N/V, coughing, HA, dizziness, achy back on left side; No fever, not tolerating PO's well.  Patient advised to seek medical assistance if symptoms do not improve or get worse over the next few hours.  Dr Garcia notified.

## 2024-10-03 NOTE — TELEPHONE ENCOUNTER
Spoke with patient.  Noting nausea and headache after upper endoscopy yesterday.  No abdominal pain or fever.  Will try antiemetics and continue to get down fluids.  No other worrisome symptoms. I will follow-up with her when pathology available.  She will contact me with worsening problems.

## 2024-10-09 ENCOUNTER — APPOINTMENT (OUTPATIENT)
Dept: PRIMARY CARE | Facility: CLINIC | Age: 69
End: 2024-10-09
Payer: MEDICARE

## 2024-10-09 VITALS
OXYGEN SATURATION: 97 % | TEMPERATURE: 98.6 F | HEART RATE: 79 BPM | HEIGHT: 61 IN | RESPIRATION RATE: 16 BRPM | SYSTOLIC BLOOD PRESSURE: 142 MMHG | DIASTOLIC BLOOD PRESSURE: 82 MMHG | WEIGHT: 195 LBS | BODY MASS INDEX: 36.82 KG/M2

## 2024-10-09 DIAGNOSIS — Z78.0 POST-MENOPAUSAL: Primary | ICD-10-CM

## 2024-10-09 DIAGNOSIS — F43.9 SITUATIONAL STRESS: ICD-10-CM

## 2024-10-09 DIAGNOSIS — D3A.8 NEUROENDOCRINE TUMOR: ICD-10-CM

## 2024-10-09 DIAGNOSIS — M48.02 CERVICAL SPINAL STENOSIS: ICD-10-CM

## 2024-10-09 PROCEDURE — 1159F MED LIST DOCD IN RCRD: CPT | Performed by: FAMILY MEDICINE

## 2024-10-09 PROCEDURE — 99214 OFFICE O/P EST MOD 30 MIN: CPT | Performed by: FAMILY MEDICINE

## 2024-10-09 PROCEDURE — 1036F TOBACCO NON-USER: CPT | Performed by: FAMILY MEDICINE

## 2024-10-09 PROCEDURE — 3077F SYST BP >= 140 MM HG: CPT | Performed by: FAMILY MEDICINE

## 2024-10-09 PROCEDURE — 3079F DIAST BP 80-89 MM HG: CPT | Performed by: FAMILY MEDICINE

## 2024-10-09 PROCEDURE — 1157F ADVNC CARE PLAN IN RCRD: CPT | Performed by: FAMILY MEDICINE

## 2024-10-09 PROCEDURE — 3008F BODY MASS INDEX DOCD: CPT | Performed by: FAMILY MEDICINE

## 2024-10-09 RX ORDER — METHYLPREDNISOLONE 4 MG/1
TABLET ORAL
Qty: 21 TABLET | Refills: 0 | Status: SHIPPED | OUTPATIENT
Start: 2024-10-09

## 2024-10-09 NOTE — PROGRESS NOTES
"Subjective   Marissa C Eveline \"Harman or Ms Mosquera\" is a 69 y.o. female who presents for No chief complaint on file..  HPI    Feels overwhelmed. Frustrated w care. Pending Bx from endo US. PET w suspected NE tumor in duodenum.   Can't eat or sleep. Very angry.   Does not want meds for mood or sleep.     Tried GLP, not compatable w NE tumor     Dentist appt recently and was told needed root canal but then resolved    B/l sharp pain in upper arms radaites down arms Hx c-spine surg   L lower gluteal pain     Current Outpatient Medications on File Prior to Visit   Medication Sig Dispense Refill    amLODIPine (Norvasc) 10 mg tablet Take 1 tablet (10 mg) by mouth once daily. 90 tablet 3    atorvastatin (Lipitor) 10 mg tablet TAKE 1 TABLET BY MOUTH EVERY DAY 90 tablet 3    calcium carbonate (Tums) 200 mg calcium chewable tablet Chew.      levothyroxine (Synthroid, Levoxyl) 75 mcg tablet TAKE 1 TABLET BY MOUTH EVERY DAY 90 tablet 3    multivitamin tablet Take 1 tablet by mouth once daily.      omeprazole OTC (PriLOSEC OTC) 20 mg EC tablet Take 1 tablet (20 mg) by mouth once daily in the morning. Take before meals. Do not crush, chew, or split.      ondansetron (Zofran) 4 mg tablet Take 2 tablets (8 mg) by mouth 2 times a day as needed for nausea or vomiting. 20 tablet 3    Arexvy, PF, 120 mcg/0.5 mL suspension for reconstitution       ondansetron (Zofran) 4 mg tablet Take 1 tablet (4 mg) by mouth every 8 hours if needed for nausea or vomiting. 30 tablet 2    saccharomyces boulardii (Florastor) 250 mg capsule Take 1 capsule (250 mg) by mouth once daily.       Current Facility-Administered Medications on File Prior to Visit   Medication Dose Route Frequency Provider Last Rate Last Admin    perflutren lipid microspheres (Definity) injection 0.5-10 mL of dilution  0.5-10 mL of dilution intravenous Once in imaging Jose BRIGGS MD                      Objective   /82 (BP Location: Left arm)   Pulse 79   Temp 37 °C " "(98.6 °F)   Resp 16   Ht 1.549 m (5' 1\")   Wt 88.5 kg (195 lb)   SpO2 97%   BMI 36.84 kg/m²    Physical Exam  General: NAD  HEENT:NCAT, PERRLA, nml OP  Neck: no cervical KALPANA  Heart: RRR no murmur, no edema   Lungs: CTA b/l, no wheeze or rhonchi   GI: abd soft, nontender, nondistended.   MSK: no c/c/e. nml gait   Skin: warm and dry  Psych: cooperative, appropriate affect  Neuro: speech clear. A&Ox3  Assessment/Plan   Problem List Items Addressed This Visit       Cervical spinal stenosis  Hx C-spine hardware w worsening UE radicular Sx   Check c spine xray  Medrol dose pack     Relevant Medications    methylPREDNISolone (Medrol Dospak) 4 mg tablets    Other Relevant Orders    XR cervical spine 2-3 views     Other Visit Diagnoses       Post-menopausal    -  Primary  Hx of prolia d/t comp Fx   Check DEXA  DEXAs nml in past       Relevant Orders    XR DEXA bone density    Neuroendocrine tumor      -abnml PET which prompted endo US w Bx   -awaiting upcoming Bx     Lumbar radiculopathy: medrol dose pack     Situational stress: d/t difficult medical conditions and no clear Dx yet about NE tumor she has been extremely stressed.   Pt Expressed concerns about her care.   Offered support in any way that I can.   Discussed briefly Rx mgnt but adamantly declines.     AWV 2 wk               "

## 2024-10-11 ENCOUNTER — APPOINTMENT (OUTPATIENT)
Dept: PRIMARY CARE | Facility: CLINIC | Age: 69
End: 2024-10-11
Payer: MEDICARE

## 2024-10-11 ENCOUNTER — TELEPHONE (OUTPATIENT)
Dept: GASTROENTEROLOGY | Facility: HOSPITAL | Age: 69
End: 2024-10-11

## 2024-10-11 NOTE — TELEPHONE ENCOUNTER
Patient called to receive results from her procedure biopsies and the patient was informed the results were still showing as active and in process. Patient expressed her frustration with the amount of time it has taken for the results to come back, but the patient did confirm understanding.

## 2024-10-15 ENCOUNTER — APPOINTMENT (OUTPATIENT)
Dept: OPHTHALMOLOGY | Facility: CLINIC | Age: 69
End: 2024-10-15
Payer: MEDICARE

## 2024-10-18 DIAGNOSIS — D3A.8 NEUROENDOCRINE TUMOR: Primary | ICD-10-CM

## 2024-10-18 LAB
LAB AP ASR DISCLAIMER: NORMAL
LABORATORY COMMENT REPORT: NORMAL
PATH REPORT.COMMENTS IMP SPEC: NORMAL
PATH REPORT.FINAL DX SPEC: NORMAL
PATH REPORT.GROSS SPEC: NORMAL
PATH REPORT.RELEVANT HX SPEC: NORMAL
PATH REPORT.TOTAL CANCER: NORMAL

## 2024-10-21 PROBLEM — D3A.8: Status: ACTIVE | Noted: 2024-10-21

## 2024-10-21 NOTE — PROGRESS NOTES
"Chief Complaint:  Duodenal NET    GI = Radha Angela  Surg = Eugeniomir    HPI:  70 yo woman from [place] referred for duod net.  Has a complex history summarized well below:    \"complex history including a vertical banded gastroplasty in 2000 as well as a hiatal hernia repair and Slava gastroplasty performed.  This was complicated by gastric dysmotility and functional dysphagia requiring PEG tube placement from [id number].  She underwent surgery in November 2017 with lysis of adhesions and reversal of her vertical band gastroplasty.  Patient's last endoscopy was in March 2023 and was notable for retained food.  She also has had a gastric emptying scan consistent with gastroparesis.  She is also been treated for SIBO multiple times with rifaximin.  She also has been diagnosed with dumping syndrome.  She was found to have an elevated chromogranin a level followed by a PET scan which did reveal uptake in the duodenal wall consistent with a possible neuroendocrine tumor. \"    Chromo A Aug 2024:  679 (nl < 311)    NETSPOT Sept 2024:  IMPRESSION:  1. A focal Ga-68 Dotatate avid lesion within the 1st portion of the  duodenum is suspicious for a primary neuroendocrine lesion; correlate  with direct visualization as clinically indicated.  2. No evidence of distant Ga-68 Dotatate avid metastatic or jae  disease.    Of note, most recent prior abd imaging was MRI in 2023:  IMPRESSION:  No significant focal narrowing of the celiac trunk or SMA or other  acute abdominal process.    EGD/EUS Oct 2024:    Result Text   Impression  Grade B esophagitis in the lower third of the esophagus  Healthy previous vertical band gastroplasty and reversal of rupesh-en-y gastric bypass in the body of the stomach  10 mm x 6 mm nodule, originating in the submucosa was visualized in the duodenal bulb; performed cold forceps biopsy with partial removal  The gallbladder was visualized.  The bile duct appeared normal.  The pancreatic " "parenchyma was visualized and appeared to have a normal salt and pepper appearance.  The parenchyma of the liver appeared normal. The parenchyma was visualized in the left lobe of the liver.         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  She has never had formal testing.    Select PMH:  HTN, HLD, hypthyroid, vit deficiencies, malabs, fxnl bowel dz, gastroparesis, GERD, CKD, hypercalcemia, arthritis, chronic pain, tremor, depression, SIBO    PSH: \"history of laparoscopic hiatal hernia repair, takedown of plication, and reversal of VBG with gastrogastrostomy in 2016. In the distant past, she had a Slava gastroplasty with plication and VBG done for management of a hiatal hernia and obesity. \"  R TKA with revisioin, 3.5 gland parathyroid, L shoulder, c-spine, R shoulder    Soc:  .  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    PE:  Obese.  Abd soft and NT with upper midline incision.  No obvious hernia.      Impression / Plan:    This patient has a 1 cm duodenal bulb neuroendocrine tumor that is well-differentiated.  This lesion is overwhelmingly likely nonfunctional.  I have a very low suspicion that this lesion has anything to do with her complex symptomatology which as I commented above she thinks is related to insulin and glucose.  She has never had a formal endocrinology evaluation she tells me.    In general we would recommend removal of a duodenal neuroendocrine tumor that is 1 cm in size.  Her neuroendocrine pet imaging does not suggest any disseminated disease.  We do not have any contemporary or recent good-quality cross-sectional imaging.  For that I would certainly get a pancreas protocol CT.      My offerings to this patient would either be a local resection possibly cherry " picking some surrounding lymph nodes.  The other option would be a very distal gastrectomy and proximal duodenectomy harvesting some lymph nodes with the specimen.  Those would be done open.  This patient was understandably quite concerned about an open operation given her previous surgery.  Her initial operation was open and her revisional operation was laparoscopic.  She wondered whether this could be removed endoscopically, and she will be discussing that with Dr. Garcia.    With a 1 cm lesion, and otherwise negative PET scan, and assuming an unremarkable pancreas protocol CT, I think it is not out of the realm of possibility for this patient to think about endoscopic resection if that is feasible.  While that would not be the classic approach, it may fit her desires better.  She would certainly see neuroendocrine medical oncology after any removal and be put on surveillance.    This patient will be following up with Marlee Angela and Dr Garcia and [place] back with me at a later time.    This note has been dictated with voice recognition software and has not been reviewed for grammar or content errors.    Update after clinic:  I spoke with our main NET MO to run this pt by him.  Again, while the std answer is surgical resection, given the particulars with this pt, he felt endoscopic resection (if feasible) and surveillance is a reasonable option to consider.

## 2024-10-22 ENCOUNTER — APPOINTMENT (OUTPATIENT)
Dept: OPHTHALMOLOGY | Facility: CLINIC | Age: 69
End: 2024-10-22
Payer: MEDICARE

## 2024-10-22 ENCOUNTER — OFFICE VISIT (OUTPATIENT)
Dept: SURGICAL ONCOLOGY | Facility: CLINIC | Age: 69
End: 2024-10-22
Payer: MEDICARE

## 2024-10-22 ENCOUNTER — APPOINTMENT (OUTPATIENT)
Dept: PRIMARY CARE | Facility: CLINIC | Age: 69
End: 2024-10-22
Payer: MEDICARE

## 2024-10-22 VITALS
WEIGHT: 194.5 LBS | DIASTOLIC BLOOD PRESSURE: 89 MMHG | HEIGHT: 61 IN | RESPIRATION RATE: 18 BRPM | BODY MASS INDEX: 36.72 KG/M2 | SYSTOLIC BLOOD PRESSURE: 148 MMHG | TEMPERATURE: 97.9 F | HEART RATE: 102 BPM

## 2024-10-22 DIAGNOSIS — D3A.8: Primary | ICD-10-CM

## 2024-10-22 DIAGNOSIS — D3A.8 NEUROENDOCRINE TUMOR: ICD-10-CM

## 2024-10-22 PROCEDURE — 3077F SYST BP >= 140 MM HG: CPT | Performed by: SURGERY

## 2024-10-22 PROCEDURE — 1036F TOBACCO NON-USER: CPT | Performed by: SURGERY

## 2024-10-22 PROCEDURE — 1159F MED LIST DOCD IN RCRD: CPT | Performed by: SURGERY

## 2024-10-22 PROCEDURE — 3079F DIAST BP 80-89 MM HG: CPT | Performed by: SURGERY

## 2024-10-22 PROCEDURE — 1126F AMNT PAIN NOTED NONE PRSNT: CPT | Performed by: SURGERY

## 2024-10-22 PROCEDURE — 99214 OFFICE O/P EST MOD 30 MIN: CPT | Performed by: SURGERY

## 2024-10-22 PROCEDURE — 3008F BODY MASS INDEX DOCD: CPT | Performed by: SURGERY

## 2024-10-22 PROCEDURE — 99204 OFFICE O/P NEW MOD 45 MIN: CPT | Performed by: SURGERY

## 2024-10-22 PROCEDURE — 1157F ADVNC CARE PLAN IN RCRD: CPT | Performed by: SURGERY

## 2024-10-22 ASSESSMENT — ENCOUNTER SYMPTOMS
OCCASIONAL FEELINGS OF UNSTEADINESS: 0
LOSS OF SENSATION IN FEET: 0
DEPRESSION: 1

## 2024-10-22 ASSESSMENT — PATIENT HEALTH QUESTIONNAIRE - PHQ9
1. LITTLE INTEREST OR PLEASURE IN DOING THINGS: NOT AT ALL
SUM OF ALL RESPONSES TO PHQ9 QUESTIONS 1 AND 2: 0
2. FEELING DOWN, DEPRESSED OR HOPELESS: NOT AT ALL

## 2024-10-22 ASSESSMENT — PAIN SCALES - GENERAL: PAINLEVEL_OUTOF10: 0-NO PAIN

## 2024-10-23 ENCOUNTER — OFFICE VISIT (OUTPATIENT)
Dept: PRIMARY CARE | Facility: CLINIC | Age: 69
End: 2024-10-23
Payer: MEDICARE

## 2024-10-23 VITALS
WEIGHT: 194 LBS | HEART RATE: 72 BPM | DIASTOLIC BLOOD PRESSURE: 82 MMHG | SYSTOLIC BLOOD PRESSURE: 132 MMHG | TEMPERATURE: 98.2 F | BODY MASS INDEX: 36.63 KG/M2 | RESPIRATION RATE: 16 BRPM | HEIGHT: 61 IN | OXYGEN SATURATION: 98 %

## 2024-10-23 DIAGNOSIS — E16.2 HYPOGLYCEMIA: ICD-10-CM

## 2024-10-23 DIAGNOSIS — D3A.8 NEUROENDOCRINE TUMOR: Primary | ICD-10-CM

## 2024-10-23 PROCEDURE — 1123F ACP DISCUSS/DSCN MKR DOCD: CPT | Performed by: FAMILY MEDICINE

## 2024-10-23 PROCEDURE — 3079F DIAST BP 80-89 MM HG: CPT | Performed by: FAMILY MEDICINE

## 2024-10-23 PROCEDURE — 3008F BODY MASS INDEX DOCD: CPT | Performed by: FAMILY MEDICINE

## 2024-10-23 PROCEDURE — 1159F MED LIST DOCD IN RCRD: CPT | Performed by: FAMILY MEDICINE

## 2024-10-23 PROCEDURE — 1158F ADVNC CARE PLAN TLK DOCD: CPT | Performed by: FAMILY MEDICINE

## 2024-10-23 PROCEDURE — 1036F TOBACCO NON-USER: CPT | Performed by: FAMILY MEDICINE

## 2024-10-23 PROCEDURE — 1170F FXNL STATUS ASSESSED: CPT | Performed by: FAMILY MEDICINE

## 2024-10-23 PROCEDURE — 1157F ADVNC CARE PLAN IN RCRD: CPT | Performed by: FAMILY MEDICINE

## 2024-10-23 PROCEDURE — 3075F SYST BP GE 130 - 139MM HG: CPT | Performed by: FAMILY MEDICINE

## 2024-10-23 PROCEDURE — 99214 OFFICE O/P EST MOD 30 MIN: CPT | Performed by: FAMILY MEDICINE

## 2024-10-23 RX ORDER — BLOOD-GLUCOSE SENSOR
EACH MISCELLANEOUS
Qty: 1 EACH | Refills: 5 | Status: SHIPPED | OUTPATIENT
Start: 2024-10-23

## 2024-10-23 ASSESSMENT — ACTIVITIES OF DAILY LIVING (ADL)
BATHING: INDEPENDENT
MANAGING_FINANCES: INDEPENDENT
DOING_HOUSEWORK: INDEPENDENT
GROCERY_SHOPPING: INDEPENDENT
TAKING_MEDICATION: INDEPENDENT
DRESSING: INDEPENDENT

## 2024-10-23 ASSESSMENT — ENCOUNTER SYMPTOMS
OCCASIONAL FEELINGS OF UNSTEADINESS: 0
LOSS OF SENSATION IN FEET: 0

## 2024-10-23 NOTE — PROGRESS NOTES
"Barry Mosquera \"Harman or Ms Mosquera\" is a 69 y.o. female who presents for insulinoma, Medicare Annual Wellness Visit Subsequent, and Procedure (Discuss PET tumor).  HPI    Here w  for f/up.     Episodes of lightheaded, diaphoresis, mood changes w eating. Getting much worse.     Confusion about Bx results. Does she\" have cancer or not\"    Mets w surg for NE tumor yesterday. She is unclear about direction to go    GI placed endo RF for hypogly episodes. Has not used CGM     Current Outpatient Medications on File Prior to Visit   Medication Sig Dispense Refill    amLODIPine (Norvasc) 10 mg tablet Take 1 tablet (10 mg) by mouth once daily. 90 tablet 3    atorvastatin (Lipitor) 10 mg tablet TAKE 1 TABLET BY MOUTH EVERY DAY 90 tablet 3    calcium carbonate (Tums) 200 mg calcium chewable tablet Chew.      levothyroxine (Synthroid, Levoxyl) 75 mcg tablet TAKE 1 TABLET BY MOUTH EVERY DAY 90 tablet 3    multivitamin tablet Take 1 tablet by mouth once daily.      omeprazole OTC (PriLOSEC OTC) 20 mg EC tablet Take 1 tablet (20 mg) by mouth once daily in the morning. Take before meals. Do not crush, chew, or split.      ondansetron (Zofran) 4 mg tablet Take 2 tablets (8 mg) by mouth 2 times a day as needed for nausea or vomiting. 20 tablet 3    [DISCONTINUED] Arexvy, PF, 120 mcg/0.5 mL suspension for reconstitution  (Patient not taking: Reported on 10/23/2024)      [DISCONTINUED] methylPREDNISolone (Medrol Dospak) 4 mg tablets Take as directed on package. (Patient not taking: Reported on 10/23/2024) 21 tablet 0    [DISCONTINUED] ondansetron (Zofran) 4 mg tablet Take 1 tablet (4 mg) by mouth every 8 hours if needed for nausea or vomiting. (Patient not taking: Reported on 10/23/2024) 30 tablet 2    [DISCONTINUED] saccharomyces boulardii (Florastor) 250 mg capsule Take 1 capsule (250 mg) by mouth once daily. (Patient not taking: Reported on 10/23/2024)       Current Facility-Administered Medications on File " "Prior to Visit   Medication Dose Route Frequency Provider Last Rate Last Admin    perflutren lipid microspheres (Definity) injection 0.5-10 mL of dilution  0.5-10 mL of dilution intravenous Once in imaging Jose BRIGGS MD                      Objective   /82   Pulse 72   Temp 36.8 °C (98.2 °F)   Resp 16   Ht 1.55 m (5' 1.02\")   Wt 88 kg (194 lb)   SpO2 98%   BMI 36.63 kg/m²    Physical Exam  General: NAD  HEENT:NCAT, PERRLA, nml OP, b/l TM clear   Neck: no cervical KALPANA  Heart: RRR no murmur, no edema   Lungs: CTA b/l, no wheeze or rhonchi   GI: abd soft, nontender, nondistended.   MSK: no c/c/e. nml gait   Skin: warm and dry  Psych: cooperative, agitated and upset.   Neuro: speech clear. A&Ox3  Assessment/Plan   Problem List Items Addressed This Visit       Hypoglycemia    Relevant Medications    FreeStyle Sophy 3 Sensor device     Other Visit Diagnoses       Neuroendocrine tumor    -  Primary  Pt and  had many questions about recent NE tumor Dx.   Discussed I am not qualified to answer all of these questions but I tried to the best of my ability.   Discussed this is a controversial tumor and its behavior is not fully known but has been recommended to be removed.   Dr Marmolejo d/w NET MO and advised the endoscopic resection is appropriate.   She is frustrated that her Sx may not improve w removal  Therefore, an endo RF was placed by GI yest. She was advised to call for appt.   I ordered a CGM to give more information about these episode, while CGM is NOT fully diagnostic for insulinoma.   I started an EPICchat w Marlee Angela/Dr Marmolejo/Dr Garcia about patients concerns/questions. All whom have been in discussion with the patient but unfortunately she remains upset about her care.   I am unclear how I can further assist her at this junction.   I Offer a patient advocate which she declines.   Discussed her mental health,  and patient feel that she is doing ok mentally but stressed " by the situation.

## 2024-10-24 ENCOUNTER — APPOINTMENT (OUTPATIENT)
Dept: NEUROLOGY | Facility: CLINIC | Age: 69
End: 2024-10-24
Payer: MEDICARE

## 2024-10-29 ENCOUNTER — APPOINTMENT (OUTPATIENT)
Dept: RADIOLOGY | Facility: HOSPITAL | Age: 69
End: 2024-10-29
Payer: MEDICARE

## 2024-10-29 ENCOUNTER — APPOINTMENT (OUTPATIENT)
Dept: CARDIOLOGY | Facility: HOSPITAL | Age: 69
End: 2024-10-29
Payer: MEDICARE

## 2024-10-29 ENCOUNTER — HOSPITAL ENCOUNTER (INPATIENT)
Facility: HOSPITAL | Age: 69
LOS: 1 days | Discharge: HOME | End: 2024-10-31
Attending: EMERGENCY MEDICINE | Admitting: STUDENT IN AN ORGANIZED HEALTH CARE EDUCATION/TRAINING PROGRAM
Payer: MEDICARE

## 2024-10-29 DIAGNOSIS — E16.2 HYPOGLYCEMIA: Primary | ICD-10-CM

## 2024-10-29 DIAGNOSIS — D13.7 INSULINOMA: ICD-10-CM

## 2024-10-29 LAB
ALBUMIN SERPL BCP-MCNC: 4.1 G/DL (ref 3.4–5)
ALP SERPL-CCNC: 89 U/L (ref 33–136)
ALT SERPL W P-5'-P-CCNC: 15 U/L (ref 7–45)
ANION GAP SERPL CALC-SCNC: 12 MMOL/L (ref 10–20)
AST SERPL W P-5'-P-CCNC: 18 U/L (ref 9–39)
B-OH-BUTYR SERPL-SCNC: 0.13 MMOL/L (ref 0.02–0.27)
B-OH-BUTYR SERPL-SCNC: 0.14 MMOL/L (ref 0.02–0.27)
BASOPHILS # BLD AUTO: 0.06 X10*3/UL (ref 0–0.1)
BASOPHILS NFR BLD AUTO: 1 %
BILIRUB SERPL-MCNC: 0.7 MG/DL (ref 0–1.2)
BUN SERPL-MCNC: 22 MG/DL (ref 6–23)
CALCIUM SERPL-MCNC: 9.4 MG/DL (ref 8.6–10.3)
CARDIAC TROPONIN I PNL SERPL HS: 4 NG/L (ref 0–13)
CHLORIDE SERPL-SCNC: 109 MMOL/L (ref 98–107)
CO2 SERPL-SCNC: 27 MMOL/L (ref 21–32)
CREAT SERPL-MCNC: 1.12 MG/DL (ref 0.5–1.05)
EGFRCR SERPLBLD CKD-EPI 2021: 53 ML/MIN/1.73M*2
EOSINOPHIL # BLD AUTO: 0.23 X10*3/UL (ref 0–0.7)
EOSINOPHIL NFR BLD AUTO: 3.7 %
ERYTHROCYTE [DISTWIDTH] IN BLOOD BY AUTOMATED COUNT: 13.8 % (ref 11.5–14.5)
GLUCOSE BLD MANUAL STRIP-MCNC: 90 MG/DL (ref 74–99)
GLUCOSE BLD MANUAL STRIP-MCNC: 99 MG/DL (ref 74–99)
GLUCOSE SERPL-MCNC: 82 MG/DL (ref 74–99)
GLUCOSE SERPL-MCNC: 99 MG/DL (ref 74–99)
HCT VFR BLD AUTO: 41.5 % (ref 36–46)
HGB BLD-MCNC: 13.4 G/DL (ref 12–16)
IMM GRANULOCYTES # BLD AUTO: 0.02 X10*3/UL (ref 0–0.7)
IMM GRANULOCYTES NFR BLD AUTO: 0.3 % (ref 0–0.9)
LYMPHOCYTES # BLD AUTO: 1.38 X10*3/UL (ref 1.2–4.8)
LYMPHOCYTES NFR BLD AUTO: 22.2 %
MCH RBC QN AUTO: 28.5 PG (ref 26–34)
MCHC RBC AUTO-ENTMCNC: 32.3 G/DL (ref 32–36)
MCV RBC AUTO: 88 FL (ref 80–100)
MONOCYTES # BLD AUTO: 0.47 X10*3/UL (ref 0.1–1)
MONOCYTES NFR BLD AUTO: 7.5 %
NEUTROPHILS # BLD AUTO: 4.07 X10*3/UL (ref 1.2–7.7)
NEUTROPHILS NFR BLD AUTO: 65.3 %
NRBC BLD-RTO: 0 /100 WBCS (ref 0–0)
PLATELET # BLD AUTO: 234 X10*3/UL (ref 150–450)
POTASSIUM SERPL-SCNC: 4.6 MMOL/L (ref 3.5–5.3)
PROT SERPL-MCNC: 6.2 G/DL (ref 6.4–8.2)
RBC # BLD AUTO: 4.71 X10*6/UL (ref 4–5.2)
SODIUM SERPL-SCNC: 143 MMOL/L (ref 136–145)
TSH SERPL-ACNC: 1.95 MIU/L (ref 0.44–3.98)
WBC # BLD AUTO: 6.2 X10*3/UL (ref 4.4–11.3)

## 2024-10-29 PROCEDURE — 84206 ASSAY OF PROINSULIN: CPT | Performed by: STUDENT IN AN ORGANIZED HEALTH CARE EDUCATION/TRAINING PROGRAM

## 2024-10-29 PROCEDURE — G0378 HOSPITAL OBSERVATION PER HR: HCPCS

## 2024-10-29 PROCEDURE — 70450 CT HEAD/BRAIN W/O DYE: CPT | Performed by: RADIOLOGY

## 2024-10-29 PROCEDURE — 2500000004 HC RX 250 GENERAL PHARMACY W/ HCPCS (ALT 636 FOR OP/ED): Performed by: STUDENT IN AN ORGANIZED HEALTH CARE EDUCATION/TRAINING PROGRAM

## 2024-10-29 PROCEDURE — 2500000005 HC RX 250 GENERAL PHARMACY W/O HCPCS: Performed by: NURSE PRACTITIONER

## 2024-10-29 PROCEDURE — 82947 ASSAY GLUCOSE BLOOD QUANT: CPT

## 2024-10-29 PROCEDURE — 83525 ASSAY OF INSULIN: CPT | Mod: AHULAB | Performed by: PHYSICIAN ASSISTANT

## 2024-10-29 PROCEDURE — 80053 COMPREHEN METABOLIC PANEL: CPT | Performed by: PHYSICIAN ASSISTANT

## 2024-10-29 PROCEDURE — 84484 ASSAY OF TROPONIN QUANT: CPT | Performed by: PHYSICIAN ASSISTANT

## 2024-10-29 PROCEDURE — 82010 KETONE BODYS QUAN: CPT | Performed by: STUDENT IN AN ORGANIZED HEALTH CARE EDUCATION/TRAINING PROGRAM

## 2024-10-29 PROCEDURE — 74177 CT ABD & PELVIS W/CONTRAST: CPT

## 2024-10-29 PROCEDURE — 71045 X-RAY EXAM CHEST 1 VIEW: CPT | Performed by: RADIOLOGY

## 2024-10-29 PROCEDURE — 85025 COMPLETE CBC W/AUTO DIFF WBC: CPT | Performed by: PHYSICIAN ASSISTANT

## 2024-10-29 PROCEDURE — 84681 ASSAY OF C-PEPTIDE: CPT | Mod: AHULAB | Performed by: PHYSICIAN ASSISTANT

## 2024-10-29 PROCEDURE — 99285 EMERGENCY DEPT VISIT HI MDM: CPT

## 2024-10-29 PROCEDURE — 83525 ASSAY OF INSULIN: CPT | Mod: AHULAB | Performed by: STUDENT IN AN ORGANIZED HEALTH CARE EDUCATION/TRAINING PROGRAM

## 2024-10-29 PROCEDURE — 36415 COLL VENOUS BLD VENIPUNCTURE: CPT | Performed by: PHYSICIAN ASSISTANT

## 2024-10-29 PROCEDURE — 74177 CT ABD & PELVIS W/CONTRAST: CPT | Performed by: RADIOLOGY

## 2024-10-29 PROCEDURE — 82947 ASSAY GLUCOSE BLOOD QUANT: CPT | Performed by: STUDENT IN AN ORGANIZED HEALTH CARE EDUCATION/TRAINING PROGRAM

## 2024-10-29 PROCEDURE — 99222 1ST HOSP IP/OBS MODERATE 55: CPT | Performed by: INTERNAL MEDICINE

## 2024-10-29 PROCEDURE — 82010 KETONE BODYS QUAN: CPT | Performed by: PHYSICIAN ASSISTANT

## 2024-10-29 PROCEDURE — 2550000001 HC RX 255 CONTRASTS: Performed by: STUDENT IN AN ORGANIZED HEALTH CARE EDUCATION/TRAINING PROGRAM

## 2024-10-29 PROCEDURE — 70450 CT HEAD/BRAIN W/O DYE: CPT

## 2024-10-29 PROCEDURE — 84443 ASSAY THYROID STIM HORMONE: CPT | Performed by: PHYSICIAN ASSISTANT

## 2024-10-29 PROCEDURE — 96372 THER/PROPH/DIAG INJ SC/IM: CPT | Performed by: STUDENT IN AN ORGANIZED HEALTH CARE EDUCATION/TRAINING PROGRAM

## 2024-10-29 PROCEDURE — 99222 1ST HOSP IP/OBS MODERATE 55: CPT | Performed by: NURSE PRACTITIONER

## 2024-10-29 PROCEDURE — 36415 COLL VENOUS BLD VENIPUNCTURE: CPT | Performed by: STUDENT IN AN ORGANIZED HEALTH CARE EDUCATION/TRAINING PROGRAM

## 2024-10-29 PROCEDURE — 93005 ELECTROCARDIOGRAM TRACING: CPT

## 2024-10-29 PROCEDURE — 71045 X-RAY EXAM CHEST 1 VIEW: CPT

## 2024-10-29 RX ORDER — LEVOTHYROXINE SODIUM 75 UG/1
75 TABLET ORAL
Status: DISCONTINUED | OUTPATIENT
Start: 2024-10-30 | End: 2024-10-31 | Stop reason: HOSPADM

## 2024-10-29 RX ORDER — POLYETHYLENE GLYCOL 3350 17 G/17G
17 POWDER, FOR SOLUTION ORAL DAILY
Status: DISCONTINUED | OUTPATIENT
Start: 2024-10-29 | End: 2024-10-31 | Stop reason: HOSPADM

## 2024-10-29 RX ORDER — ENOXAPARIN SODIUM 100 MG/ML
40 INJECTION SUBCUTANEOUS EVERY 24 HOURS
Status: DISCONTINUED | OUTPATIENT
Start: 2024-10-29 | End: 2024-10-31 | Stop reason: HOSPADM

## 2024-10-29 RX ORDER — CALCIUM CARBONATE 200(500)MG
1 TABLET,CHEWABLE ORAL 4 TIMES DAILY PRN
Status: DISCONTINUED | OUTPATIENT
Start: 2024-10-29 | End: 2024-10-31 | Stop reason: HOSPADM

## 2024-10-29 RX ORDER — ONDANSETRON 4 MG/1
8 TABLET, FILM COATED ORAL 2 TIMES DAILY PRN
Status: DISCONTINUED | OUTPATIENT
Start: 2024-10-29 | End: 2024-10-31 | Stop reason: HOSPADM

## 2024-10-29 RX ORDER — DEXTROSE 50 % IN WATER (D50W) INTRAVENOUS SYRINGE
12.5
Status: DISCONTINUED | OUTPATIENT
Start: 2024-10-29 | End: 2024-10-31 | Stop reason: HOSPADM

## 2024-10-29 RX ORDER — ATORVASTATIN CALCIUM 10 MG/1
10 TABLET, FILM COATED ORAL NIGHTLY
Status: DISCONTINUED | OUTPATIENT
Start: 2024-10-30 | End: 2024-10-31 | Stop reason: HOSPADM

## 2024-10-29 RX ORDER — DEXTROSE 50 % IN WATER (D50W) INTRAVENOUS SYRINGE
25
Status: DISCONTINUED | OUTPATIENT
Start: 2024-10-29 | End: 2024-10-31 | Stop reason: HOSPADM

## 2024-10-29 RX ORDER — PANTOPRAZOLE SODIUM 20 MG/1
20 TABLET, DELAYED RELEASE ORAL
Status: DISCONTINUED | OUTPATIENT
Start: 2024-10-30 | End: 2024-10-31 | Stop reason: HOSPADM

## 2024-10-29 RX ORDER — AMLODIPINE BESYLATE 10 MG/1
10 TABLET ORAL DAILY
Status: DISCONTINUED | OUTPATIENT
Start: 2024-10-30 | End: 2024-10-31 | Stop reason: HOSPADM

## 2024-10-29 SDOH — SOCIAL STABILITY: SOCIAL INSECURITY: DOES ANYONE TRY TO KEEP YOU FROM HAVING/CONTACTING OTHER FRIENDS OR DOING THINGS OUTSIDE YOUR HOME?: NO

## 2024-10-29 SDOH — SOCIAL STABILITY: SOCIAL INSECURITY: HAVE YOU HAD THOUGHTS OF HARMING ANYONE ELSE?: NO

## 2024-10-29 SDOH — SOCIAL STABILITY: SOCIAL INSECURITY: ARE YOU OR HAVE YOU BEEN THREATENED OR ABUSED PHYSICALLY, EMOTIONALLY, OR SEXUALLY BY ANYONE?: NO

## 2024-10-29 SDOH — SOCIAL STABILITY: SOCIAL INSECURITY: HAS ANYONE EVER THREATENED TO HURT YOUR FAMILY OR YOUR PETS?: NO

## 2024-10-29 SDOH — SOCIAL STABILITY: SOCIAL INSECURITY: ABUSE: ADULT

## 2024-10-29 SDOH — SOCIAL STABILITY: SOCIAL INSECURITY: ARE THERE ANY APPARENT SIGNS OF INJURIES/BEHAVIORS THAT COULD BE RELATED TO ABUSE/NEGLECT?: NO

## 2024-10-29 SDOH — SOCIAL STABILITY: SOCIAL INSECURITY: DO YOU FEEL ANYONE HAS EXPLOITED OR TAKEN ADVANTAGE OF YOU FINANCIALLY OR OF YOUR PERSONAL PROPERTY?: NO

## 2024-10-29 SDOH — SOCIAL STABILITY: SOCIAL INSECURITY: HAVE YOU HAD ANY THOUGHTS OF HARMING ANYONE ELSE?: NO

## 2024-10-29 SDOH — SOCIAL STABILITY: SOCIAL INSECURITY: DO YOU FEEL UNSAFE GOING BACK TO THE PLACE WHERE YOU ARE LIVING?: NO

## 2024-10-29 ASSESSMENT — ENCOUNTER SYMPTOMS
NAUSEA: 0
SHORTNESS OF BREATH: 1
DIFFICULTY URINATING: 0
DIARRHEA: 0
PALPITATIONS: 1
UNEXPECTED WEIGHT CHANGE: 0
VOMITING: 0
CONSTIPATION: 0
FREQUENCY: 0
ABDOMINAL PAIN: 0
ENDOCRINE COMMENTS: AS ABOVE

## 2024-10-29 ASSESSMENT — ACTIVITIES OF DAILY LIVING (ADL)
LACK_OF_TRANSPORTATION: NO
TOILETING: INDEPENDENT
WALKS IN HOME: INDEPENDENT
BATHING: INDEPENDENT
HEARING - RIGHT EAR: FUNCTIONAL
FEEDING YOURSELF: INDEPENDENT
DRESSING YOURSELF: INDEPENDENT
GROOMING: INDEPENDENT
HEARING - LEFT EAR: FUNCTIONAL
ADEQUATE_TO_COMPLETE_ADL: YES
JUDGMENT_ADEQUATE_SAFELY_COMPLETE_DAILY_ACTIVITIES: YES
PATIENT'S MEMORY ADEQUATE TO SAFELY COMPLETE DAILY ACTIVITIES?: YES
ASSISTIVE_DEVICE: EYEGLASSES

## 2024-10-29 ASSESSMENT — PAIN SCALES - GENERAL
PAINLEVEL_OUTOF10: 1
PAINLEVEL_OUTOF10: 1
PAINLEVEL_OUTOF10: 3

## 2024-10-29 ASSESSMENT — LIFESTYLE VARIABLES
SKIP TO QUESTIONS 9-10: 1
AUDIT-C TOTAL SCORE: 0
HOW OFTEN DO YOU HAVE 6 OR MORE DRINKS ON ONE OCCASION: NEVER
HOW OFTEN DO YOU HAVE A DRINK CONTAINING ALCOHOL: NEVER
HOW MANY STANDARD DRINKS CONTAINING ALCOHOL DO YOU HAVE ON A TYPICAL DAY: PATIENT DOES NOT DRINK
AUDIT-C TOTAL SCORE: 0

## 2024-10-29 ASSESSMENT — PAIN - FUNCTIONAL ASSESSMENT
PAIN_FUNCTIONAL_ASSESSMENT: 0-10
PAIN_FUNCTIONAL_ASSESSMENT: 0-10

## 2024-10-29 ASSESSMENT — COGNITIVE AND FUNCTIONAL STATUS - GENERAL
MOBILITY SCORE: 24
DAILY ACTIVITIY SCORE: 24
PATIENT BASELINE BEDBOUND: NO

## 2024-10-29 ASSESSMENT — COLUMBIA-SUICIDE SEVERITY RATING SCALE - C-SSRS
2. HAVE YOU ACTUALLY HAD ANY THOUGHTS OF KILLING YOURSELF?: NO
6. HAVE YOU EVER DONE ANYTHING, STARTED TO DO ANYTHING, OR PREPARED TO DO ANYTHING TO END YOUR LIFE?: NO
1. IN THE PAST MONTH, HAVE YOU WISHED YOU WERE DEAD OR WISHED YOU COULD GO TO SLEEP AND NOT WAKE UP?: NO

## 2024-10-29 ASSESSMENT — PATIENT HEALTH QUESTIONNAIRE - PHQ9
2. FEELING DOWN, DEPRESSED OR HOPELESS: SEVERAL DAYS
SUM OF ALL RESPONSES TO PHQ9 QUESTIONS 1 & 2: 2
1. LITTLE INTEREST OR PLEASURE IN DOING THINGS: SEVERAL DAYS

## 2024-10-29 ASSESSMENT — PAIN DESCRIPTION - LOCATION: LOCATION: ABDOMEN

## 2024-10-29 ASSESSMENT — PAIN DESCRIPTION - DESCRIPTORS: DESCRIPTORS: PRESSURE

## 2024-10-29 NOTE — H&P
History Of Present Illness  Marissa Mosquera is a 70 y/o Female PMH: neuroendocrine tumor, hx hypoglycemia, cervical spinal stenosis, post-menopausal,premature coronary artery disease and stroke, allergic rhinitis, s/p bariatric surgery, HTN, cervical myelopathy and radiculopathy, gout, HTN, vitamin D deficiency, hx daytime somnolence, presenting to the ED with complaints of hypoglycemia, and abdominal pain. Patient endorses two episodes where her blood glucose suddenly decreased. Patient endorses losing consciousness during this time frame. 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 Patient subsequently admitted to observation for further monitoring of above symptoms and endocrinology consult.      ED Course:  EKG on arrival: sinus bradycardia hr 58  CXR: No acute cardiopulmonary process.   Glucose: 99  Potassium: 4.6  Chloride : 109  Creatinine : 1.12  TSH : 1.95  WBC: 6.2  H.4  ED Medications Administered:   N/A       Past Medical History  Past Medical History:   Diagnosis Date    Anesthesia of skin 2015    Finger numbness    Arthritis 2008    Cataract 2018    Chronic kidney disease     Conjunctival cysts, left eye 2018    Conjunctival cyst of left eye    Disease of thyroid gland 1985    GERD (gastroesophageal reflux disease) 1976    Hypertension 2015    Hypertensive retinopathy, bilateral 2019    Hypertensive retinopathy of both eyes    Other conditions influencing health status 02/15/2017    Compression fracture    Peptic ulceration     Personal history of diseases of the skin and subcutaneous tissue 2018    History of urticaria    Personal history of other diseases of the circulatory system 2018    History of hypertension    Personal history of other specified conditions 10/31/2018    History of diarrhea    Personal history of other specified conditions  07/27/2015    History of vertigo    Retinal hemorrhage, left eye 02/13/2018    Retinal hemorrhage of left eye    Varicella 1957    Visual impairment 1963       Surgical History  Past Surgical History:   Procedure Laterality Date    BREAST BIOPSY      rt benign core    BREAST SURGERY  2007    COLONOSCOPY  06/08/2016    Complete Colonoscopy    CT ANGIO NECK  06/28/2018    CT NECK ANGIO W AND WO IV CONTRAST 6/28/2018 [facility]    CT ANGIO NECK  08/29/2022    CT NECK ANGIO W AND WO IV CONTRAST 8/29/2022 [facility]    CT HEAD ANGIO W AND WO IV CONTRAST  06/28/2018    CT HEAD ANGIO W AND WO IV CONTRAST 6/28/2018 [facility]    CT HEAD ANGIO W AND WO IV CONTRAST  08/29/2022    CT HEAD ANGIO W AND WO IV CONTRAST 8/29/2022 [facility]    FRACTURE SURGERY  1980    GASTRIC FUNDOPLICATION  06/29/2017    Esophagogastric Fundoplasty Nissen Fundoplication    JOINT REPLACEMENT  2011, 2021,2022    MR HEAD ANGIO WO IV CONTRAST  09/29/2021    MR HEAD ANGIO WO IV CONTRAST 9/29/2021 [facility]    OTHER SURGICAL HISTORY  07/17/2017    Esophagogastric Fundoplasty Laparoscopic For Paraesophageal Hernia Repair    OTHER SURGICAL HISTORY  07/17/2017    Gastroplasty Longitudinal    OTHER SURGICAL HISTORY  07/17/2017    Esophageal Lengthening    OTHER SURGICAL HISTORY  07/17/2017    Repair Of Paraesophageal Hiatus Hernia    OTHER SURGICAL HISTORY  12/18/2017    Laparoscopy Repair Of Hernia    OTHER SURGICAL HISTORY  03/05/2019    Shoulder replacement    OTHER SURGICAL HISTORY  04/22/2015    Treatment Of Wrist Fracture    OTHER SURGICAL HISTORY  04/22/2015    Open Treatment Of Clavicular Fracture    OTHER SURGICAL HISTORY  04/22/2015    Breast Surgery Excision Of Breast Single Lesion    OTHER SURGICAL HISTORY  04/22/2015    Parathyroid Complete Parathyroidectomy    SMALL INTESTINE SURGERY  11/2016    SPINE SURGERY  2019    TOTAL KNEE ARTHROPLASTY  04/22/2015    Knee Replacement    TOTAL SHOULDER ARTHROPLASTY  Bilateral         Social History  She reports that she has never smoked. She has never used smokeless tobacco. She reports that she does not currently use alcohol. She reports that she does not use drugs.    Family History  Family History   Problem Relation Name Age of Onset    Heart failure Mother Elisabet     Hypertension Mother Elisabet     Arthritis Mother Elisabet     Depression Mother Elisabet     Early natural death Mother Elisabet     Stroke Mother Elisabet     Heart failure Father Vernon     Heart disease Father Vernon     Other (Sinus problem) Brother      Cancer Other Grandfather     Cancer Maternal Grandfather Jose     Early natural death Maternal Grandfather Jose     Stroke Maternal Grandfather Jose     Alcohol abuse Brother Mario     Diabetes Brother Mario     Hypertension Brother Mario     Alcohol abuse Brother Junior     Drug abuse Brother Junior     Stroke Mother's Sister Katharine         Allergies  Patient has no known allergies.    Review of Systems     Physical Exam  Vitals reviewed.   HENT:      Head: Normocephalic.      Right Ear: Tympanic membrane normal.      Nose: Nose normal.      Mouth/Throat:      Mouth: Mucous membranes are moist.   Eyes:      Pupils: Pupils are equal, round, and reactive to light.   Cardiovascular:      Rate and Rhythm: Normal rate.   Pulmonary:      Effort: Pulmonary effort is normal.   Abdominal:      General: Abdomen is flat. There is no distension.      Tenderness: There is abdominal tenderness.   Genitourinary:     Comments: deferred  Musculoskeletal:         General: Normal range of motion.      Cervical back: Normal range of motion.   Skin:     General: Skin is warm.      Capillary Refill: Capillary refill takes less than 2 seconds.   Neurological:      General: No focal deficit present.      Mental Status: She is alert and oriented to person, place, and time. Mental status is at baseline.   Psychiatric:         Mood and Affect: Mood normal.         Behavior: Behavior normal.     "     Thought Content: Thought content normal.         Judgment: Judgment normal.          Last Recorded Vitals  Blood pressure 112/52, pulse 84, temperature 36.2 °C (97.2 °F), resp. rate 14, height 1.549 m (5' 1\"), weight 87.5 kg (193 lb), SpO2 94%.    Relevant Results  Results for orders placed or performed during the hospital encounter of 10/29/24 (from the past 24 hours)   POCT GLUCOSE   Result Value Ref Range    POCT Glucose 99 74 - 99 mg/dL   CBC and Auto Differential   Result Value Ref Range    WBC 6.2 4.4 - 11.3 x10*3/uL    nRBC 0.0 0.0 - 0.0 /100 WBCs    RBC 4.71 4.00 - 5.20 x10*6/uL    Hemoglobin 13.4 12.0 - 16.0 g/dL    Hematocrit 41.5 36.0 - 46.0 %    MCV 88 80 - 100 fL    MCH 28.5 26.0 - 34.0 pg    MCHC 32.3 32.0 - 36.0 g/dL    RDW 13.8 11.5 - 14.5 %    Platelets 234 150 - 450 x10*3/uL    Neutrophils % 65.3 40.0 - 80.0 %    Immature Granulocytes %, Automated 0.3 0.0 - 0.9 %    Lymphocytes % 22.2 13.0 - 44.0 %    Monocytes % 7.5 2.0 - 10.0 %    Eosinophils % 3.7 0.0 - 6.0 %    Basophils % 1.0 0.0 - 2.0 %    Neutrophils Absolute 4.07 1.20 - 7.70 x10*3/uL    Immature Granulocytes Absolute, Automated 0.02 0.00 - 0.70 x10*3/uL    Lymphocytes Absolute 1.38 1.20 - 4.80 x10*3/uL    Monocytes Absolute 0.47 0.10 - 1.00 x10*3/uL    Eosinophils Absolute 0.23 0.00 - 0.70 x10*3/uL    Basophils Absolute 0.06 0.00 - 0.10 x10*3/uL   Comprehensive metabolic panel   Result Value Ref Range    Glucose 99 74 - 99 mg/dL    Sodium 143 136 - 145 mmol/L    Potassium 4.6 3.5 - 5.3 mmol/L    Chloride 109 (H) 98 - 107 mmol/L    Bicarbonate 27 21 - 32 mmol/L    Anion Gap 12 10 - 20 mmol/L    Urea Nitrogen 22 6 - 23 mg/dL    Creatinine 1.12 (H) 0.50 - 1.05 mg/dL    eGFR 53 (L) >60 mL/min/1.73m*2    Calcium 9.4 8.6 - 10.3 mg/dL    Albumin 4.1 3.4 - 5.0 g/dL    Alkaline Phosphatase 89 33 - 136 U/L    Total Protein 6.2 (L) 6.4 - 8.2 g/dL    AST 18 9 - 39 U/L    Bilirubin, Total 0.7 0.0 - 1.2 mg/dL    ALT 15 7 - 45 U/L   Beta " Hydroxybutyrate   Result Value Ref Range    Beta-Hydroxybutyrate 0.14 0.02 - 0.27 mmol/L   Troponin I, High Sensitivity   Result Value Ref Range    Troponin I, High Sensitivity 4 0 - 13 ng/L   TSH with reflex to Free T4 if abnormal   Result Value Ref Range    Thyroid Stimulating Hormone 1.95 0.44 - 3.98 mIU/L   ECG 12 lead   Result Value Ref Range    Ventricular Rate 58 BPM    Atrial Rate 58 BPM    CO Interval 154 ms    QRS Duration 88 ms    QT Interval 396 ms    QTC Calculation(Bazett) 388 ms    P Axis 34 degrees    R Axis 36 degrees    T Axis 24 degrees    QRS Count 10 beats    Q Onset 217 ms    P Onset 140 ms    P Offset 202 ms    T Offset 415 ms    QTC Fredericia 391 ms   POCT GLUCOSE   Result Value Ref Range    POCT Glucose 90 74 - 99 mg/dL     *Note: Due to a large number of results and/or encounters for the requested time period, some results have not been displayed. A complete set of results can be found in Results Review.          Assessment/Plan   Assessment & Plan  Hypoglycemia      Marissa Mosquera is a 68 y/o Female PMH: neuroendocrine tumor, hx hypoglycemia, cervical spinal stenosis, post-menopausal,premature coronary artery disease and stroke, allergic rhinitis, s/p bariatric surgery, HTN, cervical myelopathy and radiculopathy, gout, HTN, vitamin D deficiency, hx daytime somnolence, presenting to the ED with complaints of hypoglycemia, and abdominal pain. Patient endorses two episodes where her blood glucose suddenly decreased. Patient endorses losing consciousness during this time frame. 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 Patient subsequently admitted to observation for further monitoring of above symptoms and endocrinology consult.      ED Course:  EKG on arrival: sinus bradycardia hr 58  CXR: No acute cardiopulmonary process.   Glucose: 99  Potassium: 4.6  Chloride : 109  Creatinine :  1.12  TSH : 1.95  WBC: 6.2  H.4  ED Medications Administered:   N/A        #hypoglycemia 2/2?  #recently diagnosed with neuroendocrine tumor  -hypoglycemia protocol   -accucheck per protocol  -hga1c, add on lipid panel  -nursing to check if CGM glucose is 55 or below: glucose, insulin, c-peptide, proinsulin, and b. Hydroxybutyrate  -Consult Endocrinology; appreciate recs      #abdominal pain  -CT a/p: pending  -PRN pain control  -PRN antiemetics      #lost of consciousness  Likely 2/2 hypoglycemia  -CTH: pending  -UA : pending  -Telemetry  -EKG on arrival : EKG on arrival: sinus bradycardia hr 58.  -last tte (24) ef 61%.  -Consider Zio /Holter on dc   -Consider Cardiology consult if warranted      #HTN  -Continue home medications  -monitor      #DVT prophylaxis  -scds    DC plan:  DC home when medically stable, pending Endocrinology consult. Interdisciplinary rounding completed with Attending Provider, Bedside RN and TCC.  Labs, results and plan of care discussed and reviewed with Dr. Santacruz.       I spent 35 minutes in the professional and overall care of this patient.  Drea Ruiz, APRN-CNP

## 2024-10-29 NOTE — CARE PLAN
The patient's goals for the shift include  be free from episodes of hypoglycemia    The clinical goals for the shift include remain free from falls; no hypoglycemic episodes

## 2024-10-29 NOTE — PROGRESS NOTES
10/29/24 1441   [facility] Disability Status   Are you deaf or do you have serious difficulty hearing? N   Are you blind or do you have serious difficulty seeing, even when wearing glasses? N   Because of a physical, mental, or emotional condition, do you have serious difficulty concentrating, remembering, or making decisions? (5 years old or older) N   Do you have serious difficulty walking or climbing stairs? N   Do you have serious difficulty dressing or bathing? N   Because of a physical, mental, or emotional condition, do you have serious difficulty doing errands alone such as visiting the doctor? N

## 2024-10-29 NOTE — CARE PLAN
Problem: Fall/Injury  Goal: Not fall by end of shift  Outcome: Progressing  Goal: Be free from injury by end of the shift  Outcome: Progressing  Goal: Verbalize understanding of personal risk factors for fall in the hospital  Outcome: Progressing  Goal: Verbalize understanding of risk factor reduction measures to prevent injury from fall in the home  Outcome: Progressing  Goal: Use assistive devices by end of the shift  Outcome: Progressing  Goal: Pace activities to prevent fatigue by end of the shift  Outcome: Progressing   The patient's goals for the shift include      The clinical goals for the shift include remain free from falls; no hypoglycemic episodes

## 2024-10-29 NOTE — PROGRESS NOTES
"Pharmacy Medication History Review    Per patient.     Marissa Mosquera \"Harman or Ms Mosquera\" is a 69 y.o. female admitted for No Principal Problem: There is no principal problem currently on the Problem List. Please update the Problem List and refresh.. Pharmacy reviewed the patient's [id number] medications and allergies for accuracy.    The list below reflectives the updated PTA list. Please review each medication in order reconciliation for additional clarification and justification.       The list below reflectives the updated allergy list. Please review each documented allergy for additional clarification and justification.  Allergies  Reviewed by Jaycee Stiles RN on 10/29/2024   No Known Allergies         Below are additional concerns with the patient's PTA list.  Prior to Admission Medications   Prescriptions Last Dose Informant   FreeStyle Sophy 3 Sensor device     Sig: Change sensor every 2 weeks   amLODIPine (Norvasc) 10 mg tablet 10/29/2024    Sig: Take 1 tablet (10 mg) by mouth once daily.   atorvastatin (Lipitor) 10 mg tablet 10/29/2024    Sig: TAKE 1 TABLET BY MOUTH EVERY DAY   calcium carbonate (Tums) 200 mg calcium chewable tablet     Sig: Chew 1 tablet (500 mg) 4 times a day as needed for indigestion or heartburn.   levothyroxine (Synthroid, Levoxyl) 75 mcg tablet 10/29/2024 Morning    Sig: TAKE 1 TABLET BY MOUTH EVERY DAY   omeprazole OTC (PriLOSEC OTC) 20 mg EC tablet 10/29/2024 Morning    Sig: Take 1 tablet (20 mg) by mouth once daily in the morning. Take before meals. Do not crush, chew, or split.   ondansetron (Zofran) 4 mg tablet     Sig: Take 2 tablets (8 mg) by mouth 2 times a day as needed for nausea or vomiting.      Facility-Administered Medications: None        Yisel Edgar    "

## 2024-10-29 NOTE — ED TRIAGE NOTES
Pt. Arrived to the ED via private car for multiple medical complaints. Pt. States that she has a tumor in her duodomen wall. She recently had a continues glucose monitor placed 4 days ag. Pt. States that she has been having blood sugars in the 200s which then will drop to mid 50s. Pt. States that she is not diabetic. Pt. Will become SOB, diaphoretic, and have abdominal pain when her sugar drops. Pt. Was informed by her primary care  To obtain an endocrinologist. Pt. Blood glucose in triage was 99

## 2024-10-29 NOTE — CONSULTS
Inpatient consult to Endocrinology  Consult performed by: Ashwin Trent MD  Consult ordered by: Alayna Santacruz MD      Reason For Consult  Hypoglycemia    History Of Present Illness  Harman or Ms Eveline Mosquera is a 69 y.o. female presenting with hypoglycemia.    Episodes for the past 1.5 year of sweats, palpitations, tachycardia, pallor with flushing at lips, weakness, responsive to food.    Episodes more frequent.  She started FreeStyle Sophy 3 CGM last week, demonstrating abrupt postprandial glucose spikes >250 mg/dl followed by hypoglycemia.  She had no prior measurement of hyper- or hypoglycemia prior to this weekend.     She is currently in a spike with glucose rising >270 mg/dl after pasta dinner.      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  History of SIBO and diarrhea    Prior history of hyperparathyroidism, resolved after 3.5 gland parathyroid surgery.       Past Medical History  She has a past medical history of Anesthesia of skin (04/20/2015), Arthritis (2008), Cataract (2018), Chronic kidney disease (2023), Conjunctival cysts, left eye (08/14/2018), Disease of thyroid gland (1985), GERD (gastroesophageal reflux disease) (1976), Hypertension (2015), Hypertensive retinopathy, bilateral (08/20/2019), Other conditions influencing health status (02/15/2017), Peptic ulceration (2023), Personal history of diseases of the skin and subcutaneous tissue (11/01/2018), Personal history of other diseases of the circulatory system (11/01/2018), Personal history of other specified conditions (10/31/2018), Personal history of other specified conditions (07/27/2015), Retinal hemorrhage, left eye (02/13/2018), Varicella (1957), and Visual impairment (1963).    Surgical History  She has a past surgical  history that includes Other surgical history (07/17/2017); Other surgical history (07/17/2017); Other surgical history (07/17/2017); Other surgical history (07/17/2017); Other surgical history (12/18/2017); Other surgical history (03/05/2019); Gastric fundoplication (06/29/2017); Colonoscopy (06/08/2016); Other surgical history (04/22/2015); Other surgical history (04/22/2015); Total knee arthroplasty (04/22/2015); Other surgical history (04/22/2015); Other surgical history (04/22/2015); CT angio head w and wo IV contrast (06/28/2018); CT angio neck (06/28/2018); MR angio head wo IV contrast (09/29/2021); CT angio head w and wo IV contrast (08/29/2022); CT angio neck (08/29/2022); Breast surgery (2007); Fracture surgery (1980); Joint replacement (2011, 2021,2022); Spine surgery (2019); Small intestine surgery (11/2016); Breast biopsy; and Total shoulder arthroplasty (Bilateral).     Social History  She reports that she has never smoked. She has never used smokeless tobacco. She reports that she does not currently use alcohol. She reports that she does not use drugs.    Family History  Family History   Problem Relation Name Age of Onset    Heart failure Mother Elisabet     Hypertension Mother Elisabet     Arthritis Mother Elisabet     Depression Mother Elisabet     Early natural death Mother Elisabet     Stroke Mother Elisabet     Heart failure Father Vernon     Heart disease Father Vernon     Other (Sinus problem) Brother      Cancer Other Grandfather     Cancer Maternal Grandfather [place]     Early natural death Maternal Grandfather [place]     Stroke Maternal Grandfather [place]     Alcohol abuse Brother Mario     Diabetes Brother Mario     Hypertension Brother Mario     Alcohol abuse Brother Junior     Drug abuse Brother Junior     Stroke Mother's Sister Katharine         Allergies  Patient has no known allergies.    Review of Systems   Constitutional:  Negative for unexpected weight change.   Eyes:  Negative for visual disturbance.  "  Respiratory:  Positive for shortness of breath (with reactions).    Cardiovascular:  Positive for palpitations (with reactions). Negative for chest pain.   Gastrointestinal:  Negative for abdominal pain, constipation, diarrhea, nausea and vomiting.   Endocrine:        As above   Genitourinary:  Negative for difficulty urinating and frequency.        Physical Exam  HENT:      Head: Normocephalic.      Mouth/Throat:      Mouth: Mucous membranes are moist.   Eyes:      Extraocular Movements: Extraocular movements intact.   Neck:      Thyroid: No thyromegaly.   Cardiovascular:      Pulses:           Radial pulses are 2+ on the right side and 2+ on the left side.   Abdominal:      Tenderness: There is no abdominal tenderness.   Musculoskeletal:      Right lower leg: No edema.      Left lower leg: No edema.   Neurological:      Mental Status: She is alert.      Motor: No tremor.   Psychiatric:         Mood and Affect: Affect normal.          ROS, PMH, FH/SH, surgical history and allergies have been reviewed.    Last Recorded Vitals  Blood pressure 120/51, pulse 84, temperature 36.2 °C (97.2 °F), resp. rate 14, height 1.549 m (5' 1\"), weight 87.5 kg (193 lb), SpO2 (!) 93%.    Relevant Results  Results from last 7 days   Lab Units 10/29/24  1356 10/29/24  0908 10/29/24  0811   POCT GLUCOSE mg/dL 90  --  99   GLUCOSE mg/dL  --  99  --       Latest Reference Range & Units 10/29/24 09:08   GLUCOSE 74 - 99 mg/dL 99   SODIUM 136 - 145 mmol/L 143   POTASSIUM 3.5 - 5.3 mmol/L 4.6   CHLORIDE 98 - 107 mmol/L 109 (H)   Bicarbonate 21 - 32 mmol/L 27   Anion Gap 10 - 20 mmol/L 12   Blood Urea Nitrogen 6 - 23 mg/dL 22   Creatinine 0.50 - 1.05 mg/dL 1.12 (H)   EGFR >60 mL/min/1.73m*2 53 (L)   Calcium 8.6 - 10.3 mg/dL 9.4   Albumin 3.4 - 5.0 g/dL 4.1   Alkaline Phosphatase 33 - 136 U/L 89   ALT 7 - 45 U/L 15   AST 9 - 39 U/L 18   Bilirubin Total 0.0 - 1.2 mg/dL 0.7   Total Protein 6.4 - 8.2 g/dL 6.2 (L)   Troponin I, High Sensitivity 0 " - 13 ng/L 4   Beta-Hydroxybutyrate 0.02 - 0.27 mmol/L 0.14   Thyroid Stimulating Hormone 0.44 - 3.98 mIU/L 1.95   WBC 4.4 - 11.3 x10*3/uL 6.2   nRBC 0.0 - 0.0 /100 WBCs 0.0   RBC 4.00 - 5.20 x10*6/uL 4.71   HEMOGLOBIN 12.0 - 16.0 g/dL 13.4   HEMATOCRIT 36.0 - 46.0 % 41.5   MCV 80 - 100 fL 88   MCH 26.0 - 34.0 pg 28.5   MCHC 32.0 - 36.0 g/dL 32.3   RED CELL DISTRIBUTION WIDTH 11.5 - 14.5 % 13.8   Platelets 150 - 450 x10*3/uL 234   Neutrophils % 40.0 - 80.0 % 65.3   Immature Granulocytes %, Automated 0.0 - 0.9 % 0.3   Lymphocytes % 13.0 - 44.0 % 22.2   Monocytes % 2.0 - 10.0 % 7.5   Eosinophils % 0.0 - 6.0 % 3.7   Basophils % 0.0 - 2.0 % 1.0   Neutrophils Absolute 1.20 - 7.70 x10*3/uL 4.07   Immature Granulocytes Absolute, Automated 0.00 - 0.70 x10*3/uL 0.02   Lymphocytes Absolute 1.20 - 4.80 x10*3/uL 1.38   Monocytes Absolute 0.10 - 1.00 x10*3/uL 0.47   Eosinophils Absolute 0.00 - 0.70 x10*3/uL 0.23   Basophils Absolute 0.00 - 0.10 x10*3/uL 0.06        CT ABDOMEN/PELVIS (10/29/24)  FINDINGS:      ABDOMINAL ORGANS:      LIVER: No focal lesion      GALL BLADDER AND BILIARY TREE: No calcified gallstone      SPLEEN: No focal lesion      PANCREAS: No focal lesion      ADRENALS: No adrenal mass      KIDNEYS AND URETERS: Rounded hypodensities arising from each kidney.  No hydronephrosis within limits of nephrogram phase images.      BOWEL: Small hiatal hernia. Dense surgical material near the GE  junction and stomach. No abnormally dilated large or small bowel  loops.      PERITONEUM, RETROPERITONEUM, NODES: No significant free fluid. No  free air. No significant retroperitoneal lymphadenopathy. Vague  increased density in the mesentery, similar to the prior exam  allowing for technical differences..      VESSELS:  Multifocal atherosclerotic calcifications. No abdominal  aortic aneurysm.      PELVIS: Urinary bladder is moderately distended without focal wall  thickening. Uterus is grossly normal in contour.      ABDOMINAL  WALL: Small fat containing supraumbilical and umbilical  hernias.      BONES: Subcentimeter sclerotic lesion in the left femoral head  similar to the previous exam. Presumed degenerative changes of the  visualized spine.      LOWER CHEST: Faint linear and dependent densities in each lung base.  No significant pleural effusion in the included areas.      IMPRESSION:  Postsurgical changes of the stomach. No evidence of bowel obstruction  or free fluid. Mild stranding in the mesentery similar to 09/04/2024.      Small hiatal and ventral hernias.      Hypodensities arising from both kidneys, probable cysts although not  fully characterized on single phase exam.      Additional findings as described above.    Assessment/Plan   Assessment & Plan  Hypoglycemia      HYPOGLYCEMIA  Symptoms of hypoglycemia for 1.5 year  CGM now indicates marked postprandial spikes followed by hypoglycemia, particular in evening.   Known neuroendocrine tumor, biopsy of which did not stain for insulin  Postprandial reactive hypoglycemic pattern, suggests relationship to bariatric/gastric surgery status.  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  No history of pituitary tumor      RECOMMENDATIONS  Anticipate spike and hypoglycemic drop  When hypoglycemic, draw glucose, insulin. c-peptide, proinsulin, beta-hydroxybutyrate  Predictable postprandial events and presence of CGM should obviate need for 72h fast.  Discussed some options for medical control if hyperinsulinemia is proven.  Will recommend genetics consultation after management of current issues.     Ashwin Trent MD

## 2024-10-29 NOTE — PROGRESS NOTES
Transitional Care Coordination Progress Note:  Plan per Medical/Surgical team: treatment of hypoglycemia, dizziness, abd pain with CTSCANs pending, endocrine consult   Status: Observation  Payor source: medicare A/B  Discharge disposition: Home with   Potential Barriers: glucose 99  ADOD: 10/30/2024  JOELLE Lam RN, BSN Transitional Care Coordinator ED# [phone]      10/29/24 1441   Discharge Planning   Living Arrangements Spouse/significant other   Support Systems Spouse/significant other   Assistance Needed endocrine work up   Type of Residence Private residence   Home or Post Acute Services None   Expected Discharge Disposition Home   Does the patient need discharge transport arranged? No   Financial Resource Strain   How hard is it for you to pay for the very basics like food, housing, medical care, and heating? Not hard   Housing Stability   In the last 12 months, was there a time when you were not able to pay the mortgage or rent on time? N   In the past 12 months, how many times have you moved where you were living? 1   At any time in the past 12 months, were you homeless or living in a shelter (including now)? N   Transportation Needs   In the past 12 months, has lack of transportation kept you from medical appointments or from getting medications? no   In the past 12 months, has lack of transportation kept you from meetings, work, or from getting things needed for daily living? No

## 2024-10-29 NOTE — ED PROVIDER NOTES
HPI   Chief Complaint   Patient presents with    Abdominal Pain       History of present illness: 69-year-old female primarily complains of changes in blood sugar.  Patient reports that she had 2 episodes where her blood sugars increased suddenly and then decrease suddenly.  When she became hypoglycemic she felt confused, her heart raced, and she had generalized weakness, and she had tingling in her bilateral hands.  She believes she may have lost consciousness during this time.  This happened twice in the last 24 hours.  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  Patient has had some similar episodes recently but is becoming more frequent.  Please note the triage complaint was for abdominal pain and patient states that she only has mild abdominal pain and that is not her primary concern currently.  Patient currently feels generalized weakness at this time.  Denies dysuria polyuria diarrhea constipation chest pain paresthesias currently, incontinence.    Review of systems: Constitutional, eye, ENT, cardiovascular, respiratory, gastrointestinal, genitourinary, neurologic, musculoskeletal, dermatologic, hematologic, endocrine systems were evaluated and were negative unless otherwise specified in history of present illness.    Medications: Reviewed and per nursing note.    Family history: Denies relevant medical conditions.    Past medical history: None per patient    Social history: Denies tobacco, alcohol, drug use.          Physical exam:    Appearance: Well-developed, well-nourished, uncomfortable-appearing, alert and oriented x3. Talking in complete sentences.    HEENT:  Head normocephalic atraumatic, extraocular movements intact, pupils equal round reactive to light, mucous membranes are moist and pink.    NECK:  Nml Inspection, no meningismus, no thyromegaly, no lymphadenopathy, no JVD, trachea is midline.    Respiratory: Clear to  auscultation bilaterally with normal bilateral excursion. No wheezes, rhonchi, crackles.    Cardiovascular: Regular rate and rhythm, no murmurs, rubs or gallops. Pulses 2+ symmetrically in the dorsalis pedis and radial pulses.    Abdomen/GI: Slight epigastric tenderness with no rebound guarding or rigidity.  Nondistended, normal bowel sounds x4. No masses or organomegaly.    :  No CVA tenderness    Neuro:  Oriented x 3, Speech Clear, cranial nerves grossly intact. Normal sensation to light touch in all 4 extremities.    Musculoskeletal: Patient spontaneously moves all 4 extremities.    Skin:  No cyanosis, clubbing, edema, open wounds, or rashes.            Patient History   Past Medical History:   Diagnosis Date    Anesthesia of skin 04/20/2015    Finger numbness    Arthritis 2008    Cataract 2018    Chronic kidney disease 2023    Conjunctival cysts, left eye 08/14/2018    Conjunctival cyst of left eye    Disease of thyroid gland 1985    GERD (gastroesophageal reflux disease) 1976    Hypertension 2015    Hypertensive retinopathy, bilateral 08/20/2019    Hypertensive retinopathy of both eyes    Other conditions influencing health status 02/15/2017    Compression fracture    Peptic ulceration 2023    Personal history of diseases of the skin and subcutaneous tissue 11/01/2018    History of urticaria    Personal history of other diseases of the circulatory system 11/01/2018    History of hypertension    Personal history of other specified conditions 10/31/2018    History of diarrhea    Personal history of other specified conditions 07/27/2015    History of vertigo    Retinal hemorrhage, left eye 02/13/2018    Retinal hemorrhage of left eye    Varicella 1957    Visual impairment 1963     Past Surgical History:   Procedure Laterality Date    BREAST BIOPSY      rt benign core    BREAST SURGERY  2007    COLONOSCOPY  06/08/2016    Complete Colonoscopy    CT ANGIO NECK  06/28/2018    CT NECK ANGIO W AND WO IV CONTRAST  6/28/2018 [facility]    CT ANGIO NECK  08/29/2022    CT NECK ANGIO W AND WO IV CONTRAST 8/29/2022 [facility]    CT HEAD ANGIO W AND WO IV CONTRAST  06/28/2018    CT HEAD ANGIO W AND WO IV CONTRAST 6/28/2018 [facility]    CT HEAD ANGIO W AND WO IV CONTRAST  08/29/2022    CT HEAD ANGIO W AND WO IV CONTRAST 8/29/2022 [facility]    FRACTURE SURGERY  1980    GASTRIC FUNDOPLICATION  06/29/2017    Esophagogastric Fundoplasty Nissen Fundoplication    JOINT REPLACEMENT  2011, 2021,2022    MR HEAD ANGIO WO IV CONTRAST  09/29/2021    MR HEAD ANGIO WO IV CONTRAST 9/29/2021 [facility]    OTHER SURGICAL HISTORY  07/17/2017    Esophagogastric Fundoplasty Laparoscopic For Paraesophageal Hernia Repair    OTHER SURGICAL HISTORY  07/17/2017    Gastroplasty Longitudinal    OTHER SURGICAL HISTORY  07/17/2017    Esophageal Lengthening    OTHER SURGICAL HISTORY  07/17/2017    Repair Of Paraesophageal Hiatus Hernia    OTHER SURGICAL HISTORY  12/18/2017    Laparoscopy Repair Of Hernia    OTHER SURGICAL HISTORY  03/05/2019    Shoulder replacement    OTHER SURGICAL HISTORY  04/22/2015    Treatment Of Wrist Fracture    OTHER SURGICAL HISTORY  04/22/2015    Open Treatment Of Clavicular Fracture    OTHER SURGICAL HISTORY  04/22/2015    Breast Surgery Excision Of Breast Single Lesion    OTHER SURGICAL HISTORY  04/22/2015    Parathyroid Complete Parathyroidectomy    SMALL INTESTINE SURGERY  11/2016    SPINE SURGERY  2019    TOTAL KNEE ARTHROPLASTY  04/22/2015    Knee Replacement    TOTAL SHOULDER ARTHROPLASTY Bilateral      Family History   Problem Relation Name Age of Onset    Heart failure Mother Elisabet     Hypertension Mother Elisabet     Arthritis Mother Elisabet     Depression Mother Elisabet     Early natural death Mother Elisabet     Stroke Mother Elisabet     Heart failure Father Vernon     Heart disease Father Vernon     Other (Sinus problem) Brother      Cancer Other Grandfather     Cancer Maternal Grandfather Jose      Early natural death Maternal Grandfather Jose     Stroke Maternal Grandfather Jose     Alcohol abuse Brother Mario     Diabetes Brother Mario     Hypertension Brother Mario     Alcohol abuse Brother Junior     Drug abuse Brother Junior     Stroke Mother's Sister Katharine      Social History     Tobacco Use    Smoking status: Never    Smokeless tobacco: Never   Vaping Use    Vaping status: Never Used   Substance Use Topics    Alcohol use: Not Currently     Comment: Social but rarely    Drug use: Never       Physical Exam   ED Triage Vitals [10/29/24 0811]   Temperature Heart Rate Respirations BP   36.2 °C (97.2 °F) 84 14 130/76      Pulse Ox Temp src Heart Rate Source Patient Position   95 % -- -- --      BP Location FiO2 (%)     -- --       Physical Exam      ED Course & MDM   Diagnoses as of 10/29/24 1238   Insulinoma   Hypoglycemia                 No data recorded     [place] Coma Scale Score: 15 (10/29/24 0945 : Emilie Jones, RN)                           Medical Decision Making  Labs Reviewed  COMPREHENSIVE METABOLIC PANEL - Abnormal     Glucose                       99                     Sodium                        143                    Potassium                     4.6                    Chloride                      109 (*)                Bicarbonate                   27                     Anion Gap                     12                     Urea Nitrogen                 22                     Creatinine                    1.12 (*)               eGFR                          53 (*)                 Calcium                       9.4                    Albumin                       4.1                    Alkaline Phosphatase          89                     Total Protein                 6.2 (*)                AST                           18                     Bilirubin, Total              0.7                    ALT                           15                  BETA HYDROXYBUTYRATE - Normal      Beta-Hydroxybutyrate          0.14                       Narrative: The beta-hydroxybutyrate test performance characteristics have been validated by  [facility]. This test has not been approved by the FDA; however,such approval is not necessary.  TROPONIN I, HIGH SENSITIVITY - Normal     Troponin I, High Sensitivity   4                          Narrative: Less than 99th percentile of normal range cutoff-                  Female and children under 18 years old <14 ng/L; Male <21 ng/L: Negative                  Repeat testing should be performed if clinically indicated.                                     Female and children under 18 years old 14-50 ng/L; Male 21-50 ng/L:                  Consistent with possible cardiac damage and possible increased clinical                   risk. Serial measurements may help to assess extent of myocardial damage.                                     >50 ng/L: Consistent with cardiac damage, increased clinical risk and                  myocardial infarction. Serial measurements may help assess extent of                   myocardial damage.                                      NOTE: Children less than 1 year old may have higher baseline troponin                   levels and results should be interpreted in conjunction with the overall                   clinical context.                                     NOTE: Troponin I testing is performed using a different                   testing methodology at [facility] than at other                   [facility]. Direct result comparisons should only                   be made within the same method.  TSH WITH REFLEX TO FREE T4 IF ABNORMAL - Normal     Thyroid Stimulating Hormone   1.95                       Narrative: TSH testing is performed using different testing methodology at [facility] than at other [facility]. Direct result comparisons should only be made  within the same method.                    POCT GLUCOSE - Normal     POCT Glucose                  99                  CBC WITH AUTO DIFFERENTIAL     WBC                           6.2                    nRBC                          0.0                    RBC                           4.71                   Hemoglobin                    13.4                   Hematocrit                    41.5                   MCV                           88                     MCH                           28.5                   MCHC                          32.3                   RDW                           13.8                   Platelets                     234                    Neutrophils %                 65.3                   Immature Granulocytes %, Automated   0.3                    Lymphocytes %                 22.2                   Monocytes %                   7.5                    Eosinophils %                 3.7                    Basophils %                   1.0                    Neutrophils Absolute          4.07                   Immature Granulocytes Absolute, Au*   0.02                   Lymphocytes Absolute          1.38                   Monocytes Absolute            0.47                   Eosinophils Absolute          0.23                   Basophils Absolute            0.06                URINALYSIS WITH REFLEX CULTURE AND MICROSCOPIC         Narrative: The following orders were created for panel order Urinalysis with Reflex Culture and Microscopic.                  Procedure                               Abnormality         Status                                     ---------                               -----------         ------                                     Urinalysis with Reflex C...[[id number]]                                                                 Extra Urine Gray Tube[[id number]]                                                                                         Please  view results for these tests on the individual orders.  C-PEPTIDE  URINALYSIS WITH REFLEX CULTURE AND MICROSCOPIC  EXTRA URINE GRAY TUBE  INSULIN, RANDOM  C-PEPTIDE    XR chest 1 view   Final Result    No acute cardiopulmonary process.          MACRO:    None          Signed by: Rehana Katelyn 10/29/2024 10:07 AM    Dictation workstation:   [id number]     CT head wo IV contrast    (Results Pending)      Patient primarily complains of changes in blood sugar with episodes of dizziness and weakness.  Differential diagnosis of reactive hypoglycemia, insulinoma, sepsis, urinary tract infection, pneumonia, electrolyte disturbance, DKA,  Coronary syndrome.  Examination shows no significant findings.    CBC CMP C-peptide beta hydroxybutyrate urinalysis TSH troponin EKG ordered.    Diagnostic studies show Troponin normal, TSH normal, chemistry with glucose 99 chloride 109 creatinine 1.12 total protein 6.2 with normal electrolytes renal liver function otherwise, beta-hydroxybutyrate normal, CBC with differential CBC with white blood cell count normal, hemoglobin normal.  Patient did not provide urine sample.  Chest x-ray shows no acute findings.  Status post open reduction internal fixation left clavicle and bilateral humeral prosthesis and cervical spine fusion.  Patient initially declined CT head.  Explained that this is being performed since she was having the dizziness and other known mass to make to evaluate for brain mass.    Spoke with Dr. Trent, endocrinologist.  Recommends to have a blood drawn during hypoglycemic episode, evaluating insulin level and C-peptide.  Also looped in gastroenterology with Marlee Angela agreeing with observation admission.  Spoke with Dr. Santacruz who will admit for observation.  Patient remains in hemodynamically stable condition.        Procedure  Procedures     Marcin Henderson PA-C  10/29/24 1473

## 2024-10-30 LAB
B-OH-BUTYR SERPL-SCNC: 0.12 MMOL/L (ref 0.02–0.27)
C PEPTIDE SERPL-MCNC: 14.2 NG/ML (ref 0.7–3.9)
C PEPTIDE SERPL-MCNC: 3.6 NG/ML (ref 0.7–3.9)
C PEPTIDE SERPL-MCNC: 4.2 NG/ML (ref 0.7–3.9)
C PEPTIDE SERPL-MCNC: 7.6 NG/ML (ref 0.7–3.9)
C PEPTIDE SERPL-MCNC: 8.2 NG/ML (ref 0.7–3.9)
GLUCOSE P FAST SERPL-MCNC: 99 MG/DL
GLUCOSE SERPL-MCNC: 109 MG/DL (ref 74–99)
GLUCOSE SERPL-MCNC: 175 MG/DL (ref 74–99)
HOLD SPECIMEN: NORMAL
HOLD SPECIMEN: NORMAL
INSULIN P FAST SERPL-ACNC: 10 UIU/ML (ref 3–25)
INSULIN SERPL-ACNC: 11 UIU/ML (ref 3–25)
INSULIN SERPL-ACNC: 12 UIU/ML (ref 3–25)
INSULIN SERPL-ACNC: 12 UIU/ML (ref 3–25)
INSULIN SERPL-ACNC: 14 UIU/ML (ref 3–25)
INSULIN SERPL-ACNC: 20 UIU/ML (ref 3–25)
INSULIN SERPL-ACNC: 56 UIU/ML (ref 3–25)

## 2024-10-30 PROCEDURE — 83525 ASSAY OF INSULIN: CPT | Mod: AHULAB | Performed by: INTERNAL MEDICINE

## 2024-10-30 PROCEDURE — 82010 KETONE BODYS QUAN: CPT | Performed by: INTERNAL MEDICINE

## 2024-10-30 PROCEDURE — G0378 HOSPITAL OBSERVATION PER HR: HCPCS

## 2024-10-30 PROCEDURE — 36415 COLL VENOUS BLD VENIPUNCTURE: CPT | Performed by: INTERNAL MEDICINE

## 2024-10-30 PROCEDURE — 82947 ASSAY GLUCOSE BLOOD QUANT: CPT | Performed by: INTERNAL MEDICINE

## 2024-10-30 PROCEDURE — 84206 ASSAY OF PROINSULIN: CPT

## 2024-10-30 PROCEDURE — 99232 SBSQ HOSP IP/OBS MODERATE 35: CPT | Performed by: STUDENT IN AN ORGANIZED HEALTH CARE EDUCATION/TRAINING PROGRAM

## 2024-10-30 PROCEDURE — 2500000001 HC RX 250 WO HCPCS SELF ADMINISTERED DRUGS (ALT 637 FOR MEDICARE OP)

## 2024-10-30 PROCEDURE — 99231 SBSQ HOSP IP/OBS SF/LOW 25: CPT | Performed by: INTERNAL MEDICINE

## 2024-10-30 PROCEDURE — 84681 ASSAY OF C-PEPTIDE: CPT | Mod: AHULAB | Performed by: INTERNAL MEDICINE

## 2024-10-30 ASSESSMENT — COGNITIVE AND FUNCTIONAL STATUS - GENERAL
CLIMB 3 TO 5 STEPS WITH RAILING: A LITTLE
MOBILITY SCORE: 22
CLIMB 3 TO 5 STEPS WITH RAILING: A LITTLE
MOBILITY SCORE: 22
WALKING IN HOSPITAL ROOM: A LITTLE
WALKING IN HOSPITAL ROOM: A LITTLE
DAILY ACTIVITIY SCORE: 24
DAILY ACTIVITIY SCORE: 24

## 2024-10-30 ASSESSMENT — PAIN SCALES - GENERAL: PAINLEVEL_OUTOF10: 0 - NO PAIN

## 2024-10-30 NOTE — CONSULTS
"Nutrition Assessment Note  Nutrition Assessment      Reason for Assessment  Reason for Assessment: Provider consult order    Pt admitted 2/2 hypoglycemia and abdominal pain.   Chart reviewed, events noted.   PMH includes NET, hypoglycemia, cervial spinal stenosis, s/p bariatric surgery (noted fundoplication per chart review) however pt stated this has been reversed, HTN.     Pt with significant amount of visits with various providers per chart review, including outpatient dietitian.     Pt pleasant, however adamant that she has tried everything diet-related and has resorted to not eating PTA d/t her \"hyperglycemic attacks.\"     Pt stated she has been using a CGM, and now there is evidence of the extremes in serum glucose levels after she eats (spikes, then significantly drops). Pt stated this happens even with foods such as broth, so this happens despite the type of food she consumes.     History:  Food and Nutrient History  Energy Intake: Poor < 50 %  Food and Nutrient History: pt stated she has just stopped eating because of the hyperglycemic extremes    Food and Nutrient Administration History  Additional Food and Nutrient Administration History: per pt, she has tried all different types of eating/diets and met with a lot of physicians, and a dietitian, and nothing has worked       Dietary Orders (From admission, onward)       Start     Ordered    10/30/24 1150  Adult diet Regular  Diet effective now        Question:  Diet type  Answer:  Regular    10/30/24 1149    10/29/24 1856  May Participate in Room Service  ( ROOM SERVICE MAY PARTICIPATE)  Once        Question:  .  Answer:  Yes    10/29/24 1855                    Anthropometrics:  Height: 154.9 cm (5' 0.98\")  Weight: 87.5 kg (192 lb 14.4 oz)  BMI (Calculated): 36.47    Weight Change  Weight History / % Weight Change: 87.4 kg 10/9/23, 86.2 kg 11/29/23, 85.7 kg 9/10/24, 88.8 kg 9/30/24, 88 kg 10/23/24, 87.5 kg 10/29/24  Significant Weight Loss: No     IBW/kg " "(Dietitian Calculated): 47.7 kg  Percent of IBW: 184 %     Scheduled medications  amLODIPine, 10 mg, oral, Daily  atorvastatin, 10 mg, oral, Nightly  enoxaparin, 40 mg, subcutaneous, q24h  levothyroxine, 75 mcg, oral, Daily before breakfast  pantoprazole, 20 mg, oral, Daily before breakfast  polyethylene glycol, 17 g, oral, Daily      Continuous medications     PRN medications  PRN medications: calcium carbonate, dextrose, dextrose, ondansetron     Latest Reference Range & Units 10/29/24 09:08 10/29/24 22:16 10/30/24 11:40 10/30/24 12:05   GLUCOSE 74 - 99 mg/dL 99 82 175 (H) 109 (H)   SODIUM 136 - 145 mmol/L 143      POTASSIUM 3.5 - 5.3 mmol/L 4.6      CHLORIDE 98 - 107 mmol/L 109 (H)      Bicarbonate 21 - 32 mmol/L 27      Anion Gap 10 - 20 mmol/L 12      Blood Urea Nitrogen 6 - 23 mg/dL 22      Creatinine 0.50 - 1.05 mg/dL 1.12 (H)      EGFR >60 mL/min/1.73m*2 53 (L)      Calcium 8.6 - 10.3 mg/dL 9.4      Albumin 3.4 - 5.0 g/dL 4.1      Alkaline Phosphatase 33 - 136 U/L 89      ALT 7 - 45 U/L 15      AST 9 - 39 U/L 18      Bilirubin Total 0.0 - 1.2 mg/dL 0.7      Total Protein 6.4 - 8.2 g/dL 6.2 (L)      Troponin I, High Sensitivity 0 - 13 ng/L 4      Beta-Hydroxybutyrate 0.02 - 0.27 mmol/L 0.14 0.13  0.12   C-Peptide 0.7 - 3.9 ng/mL 3.6 7.6 (H)     Insulin 3 - 25 uIU/mL  3 - 25 uIU/mL  12  11     (H): Data is abnormally high  (L): Data is abnormally low     Latest Reference Range & Units 10/29/24 09:08 10/29/24 22:16 10/30/24 07:58 10/30/24 11:40 10/30/24 12:05   GLUCOSE 74 - 99 mg/dL 99 82  175 (H) 109 (H)   Glucose, Fasting  mg/dL   99     (H): Data is abnormally high      Energy Needs:  Calculated Energy Needs Using Equations  Height: 154.9 cm (5' 0.98\")  Weight Used for Equation Calculations: 85.7 kg (188 lb 15 oz)  Won Hodge Equation (Overweight or Obese Patients): 1314  Equation Chosen to Use by RD: Maria Antonia Goncalves  Activity Factor: 1.2  Stress Factor: 1  Total Energy Needs: 1600  Temp: 36.1 °C (97 " °F)    Estimated Energy Needs  Method for Estimating Needs: 8539-4023 kcal/day (18-22 kcal/kg)    Estimated Protein Needs  Method for Estimating Needs: 55-70 g/day protein (1.2-1.5 g/kg IBW)    Estimated Fluid Needs  Total Fluid Estimated Needs (mL): 2200 mL  Total Fluid Estimated Needs (mL/kg): 25 mL/kg  Method for Estimating Needs: or as per MD         Nutrition Focused Physical Findings:  Subcutaneous Fat Loss  Orbital Fat Pads: Well nourished (slightly bulging fat pads)  Buccal Fat Pads: Well nourished (full, rounded cheeks)    Muscle Wasting  Temporalis: Well nourished (well-defined muscle)  Pectoralis (Clavicular Region): Well nourished (clavicle not visible)  Deltoid/Trapezius: Well nourished (rounded appearance at arm, shoulder, neck)  Interosseous: Well nourished (muscle bulges)    Edema  Edema: none         Physical Findings (Nutrition Deficiency/Toxicity)  Skin: Negative       Nutrition Diagnosis   Malnutrition Diagnosis  Patient has Malnutrition Diagnosis: Yes  Diagnosis Status: New  Malnutrition Diagnosis: Mild malnutrition related to acute disease or injury  As Evidenced by: pt report self-induced minimal oral intake for greater than five days prior to admission    Patient has Nutrition Diagnosis: Yes  Nutrition Diagnosis 1: Altered nutrition related to laboratory values  Diagnosis Status (1): New  Related to (1): unknown etiology  As Evidenced by (1): postprandial reactive hypoglycemia          Nutrition Interventions/Recommendations   Nutrition Prescription  Individualized Nutrition Prescription Provided for : contiue regular diet as d/w Dr. Trent; small frequent well balanced meals , utilizing oral nutrition supplements if needed between meals  - suggest MVI once daily (not gummy)   - follow recommendations by Endocrinology       Food and/or Nutrient Delivery Interventions  Meals and Snacks: General healthful diet  Goal: small frequent meals throughout the day, as appropriate       Coordination of  "Nutrition Care by a Nutrition Professional  Collaboration and Referral of Nutrition Care: Collaboration by nutrition professional with other providers    Education Documentation  Nutrition Care Manual, taught by Drea Patterson RD at 10/30/2024  2:54 PM.  Learner: Family, Patient  Readiness: Acceptance  Method: Explanation, Handout, Teach-back  Response: Verbalizes Understanding, Needs Reinforcement  Comment: attempted to provide pt verbally with diet education specific to hypoglycemia, however pt adamant she has attempted all \"diets\" with providers over past five years, she is now just not eating in hopes this will prevent the hypoglycemic \"attacks\"      - provided pt with written education materials:   nutrition therapy for hypoglycemia not caused by diabetes (AND) , how to treat reactive hypoglycemia ([facility]), eating right with a neuroendocrine tumor ([facility] [facility]. Pt receptive.        Nutrition Monitoring and Evaluation   Food and Nutrient Related History  Energy Intake: Estimated energy intake  Criteria: greater than 75% of meals    Fluid Intake: Estimated fluid intake  Criteria: monitor closely, at least 75% of daily fluid recommendations         Anthropometrics: Body Composition/Growth/Weight History  Weight: Weight change  Criteria: daily wts, monitor trend        Biochemical Data, Medical Tests and Procedures  Electrolyte and Renal Panel: BUN, Sodium, Calcium, serum, Chloride, Creatinine, Magnesium, Phosphorus, Potassium  Criteria: daily    Gastrointestinal Profile: Alanine aminotransferase (ALT), Alkaline phosphatase, Bilirubin, total, Aspartate aminotransferase (AST)  Criteria: as per MD    Glucose/Endocrine Profile: Glucose, casual, Glucose, fasting, Other (Comment)  Criteria: daily, or as per MD, including labs recommended by Endocrinology    Nutritional Anemia Profile: Hematocrit, Hemoglobin, Iron, serum  Criteria: as per MD    Vitamin Profile: Vitamin D, 25 " hydroxy  Criteria: as indicated    Nutrition Focused Physical Findings  Adipose: Other (Comment)  Criteria: monitor NFPE    Bones: Other (Comment)  Criteria: monitor skeletal integrity    Digestive System: Abdominal distension, Increased appetite, Nausea, Vomiting, Anorexia, Ascites, Constipation, Decrease in appetite, Diarrhea, Early satiety, Other (Comment)  Criteria: monitor GI function closely    Muscles: Muscle atrophy  Criteria: monitor NFPE    Skin: Other (Comment)  Criteria: monitor skin itegrity closely         Follow Up  Time Spent (min): 60 minutes  Last Date of Nutrition Visit: 10/30/24  Nutrition Follow-Up Needed?: Dietitian to reassess per policy  Follow up Comment: mild maln, reactive hypoglycemia ; ARMANDO

## 2024-10-30 NOTE — PROGRESS NOTES
"Marissa Mosquera \"Harman or Ms Mosquera\" is a 69 y.o. female on day 0 of admission presenting with Hypoglycemia.    Subjective   Severe reaction last night, numb and clammy.  Blood drawn but glucose was 82 mg/dl at the time.    She had another reaction following pretzels before 3AM which was around 50 mg/dl on her meter but she did not call nurses.     Scheduled medications  amLODIPine, 10 mg, oral, Daily  atorvastatin, 10 mg, oral, Nightly  enoxaparin, 40 mg, subcutaneous, q24h  levothyroxine, 75 mcg, oral, Daily before breakfast  pantoprazole, 20 mg, oral, Daily before breakfast  polyethylene glycol, 17 g, oral, Daily      Objective   Physical Exam  Constitutional:       General: She is not in acute distress.  Neurological:      Mental Status: She is alert.         Last Recorded Vitals  Blood pressure 144/72, pulse 56, temperature 36.1 °C (97 °F), temperature source Temporal, resp. rate 18, height 1.549 m (5' 1\"), weight 87.5 kg (193 lb), SpO2 96%.  Intake/Output last 3 Shifts:  No intake/output data recorded.    Relevant Results  Results from last 7 days   Lab Units 10/29/24  2216 10/29/24  1356 10/29/24  0908 10/29/24  0811   POCT GLUCOSE mg/dL  --  90  --  99   GLUCOSE mg/dL 82  --  99  --       Latest Reference Range & Units 10/29/24 22:16   GLUCOSE 74 - 99 mg/dL 82   Beta-Hydroxybutyrate 0.02 - 0.27 mmol/L 0.13         Assessment/Plan   Assessment & Plan  Hypoglycemia    HYPOGLYCEMIA  Symptoms of hypoglycemia for 1.5 year  CGM now indicates marked postprandial spikes followed by hypoglycemia  Known neuroendocrine tumor, biopsy of which did not stain for insulin  Postprandial reactive hypoglycemic pattern, suggests relationship to bariatric/gastric surgery status.  Bears confirmation of hyperinsulinemia  Events last night but still no lab correlation or confirmation of inappropriate insulin secretion.      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  No history of pituitary tumor  Prior negative testing for carcinoid and pheochromocytoma in 2018       RECOMMENDATIONS  Patient agrees to 3h glucose tolerance test  Draw glucose, insulin, c-peptide fasting and 1, 2, 3h after 75 gm glucose  If hypoglycemic reaction overtly below 55 mg/dl during the test, draw glucose, insulin. c-peptide, proinsulin, beta-hydroxybutyrate and halt the OGTT  Predictable postprandial events and presence of CGM should obviate need for 72h fast.  Discussed some options for medical control if hyperinsulinemia is proven.  Will recommend genetics consultation after management of current issues.        Ashwin Trent MD

## 2024-10-30 NOTE — PROGRESS NOTES
10/30/24 1147   Discharge Planning   Home or Post Acute Services None   Expected Discharge Disposition Home     Patient is doing a three hour glucose testing.  Endocrine following.  Plan remains for patient to return home upon discharge.  No home going needs identified at this time.  Will continue to follow for discharge planning needs.

## 2024-10-30 NOTE — CARE PLAN
The patient's goals for the shift include  safety    The clinical goals for the shift include To report better glucose control      Problem: Fall/Injury  Goal: Not fall by end of shift  Outcome: Progressing     Problem: Pain - Adult  Goal: Verbalizes/displays adequate comfort level or baseline comfort level  Outcome: Progressing     Problem: Safety - Adult  Goal: Free from fall injury  Outcome: Progressing     Problem: Chronic Conditions and Co-morbidities  Goal: Patient's chronic conditions and co-morbidity symptoms are monitored and maintained or improved  Outcome: Progressing

## 2024-10-30 NOTE — PROGRESS NOTES
"Marissa Mosquera \"Harman or Ms Mosquera\" is a 69 y.o. female on day 0 of admission presenting with Hypoglycemia.      Subjective   Overnight, pt remained uncomfortable with one drop in glucose overnight. Conditional hypoglycemia labs were drawn when glucose was in the 80s.     Patient resting this morning, remains uncomfortable. Pt did not discuss her symptoms this morning but her  describes that he is extremely nauseous, having abd pain, and gets very warm/diaphoretic and irritable after eating. States that her symptoms are worse when her sugars are high, rather than when they're low.        Objective     Last Recorded Vitals  /72 (BP Location: Left arm, Patient Position: Lying)   Pulse 56   Temp 36.4 °C (97.5 °F) (Temporal)   Resp 18   Wt 87.5 kg (193 lb)   SpO2 97%   Intake/Output last 3 Shifts:  No intake or output data in the 24 hours ending 10/30/24 1112    Admission Weight  Weight: 87.5 kg (193 lb) (10/29/24 0811)    Daily Weight  10/29/24 : 87.5 kg (193 lb)    Image Results  CT abdomen pelvis w IV contrast  Narrative: Interpreted By:  Mone Platt,   STUDY:  CT ABDOMEN PELVIS W IV CONTRAST;  10/29/2024 2:42 pm      INDICATION:  Signs/Symptoms:abdominal pain.          COMPARISON:  PET-CT 09/04/2024      ACCESSION NUMBER(S):  [id number]      ORDERING CLINICIAN:  ELDER MC      TECHNIQUE:  CT of the abdomen and pelvis was performed. Sagittal and coronal  reconstructions were generated.  75 ML Omnipaque 350 intravenous  contrast given for the exam.      FINDINGS:          ABDOMINAL ORGANS:      LIVER: No focal lesion      GALL BLADDER AND BILIARY TREE: No calcified gallstone      SPLEEN: No focal lesion      PANCREAS: No focal lesion      ADRENALS: No adrenal mass      KIDNEYS AND URETERS: Rounded hypodensities arising from each kidney.  No hydronephrosis within limits of nephrogram phase images.      BOWEL: Small hiatal hernia. Dense surgical material near the GE  junction and " stomach. No abnormally dilated large or small bowel  loops.      PERITONEUM, RETROPERITONEUM, NODES: No significant free fluid. No  free air. No significant retroperitoneal lymphadenopathy. Vague  increased density in the mesentery, similar to the prior exam  allowing for technical differences..      VESSELS:  Multifocal atherosclerotic calcifications. No abdominal  aortic aneurysm.      PELVIS: Urinary bladder is moderately distended without focal wall  thickening. Uterus is grossly normal in contour.      ABDOMINAL WALL: Small fat containing supraumbilical and umbilical  hernias.      BONES: Subcentimeter sclerotic lesion in the left femoral head  similar to the previous exam. Presumed degenerative changes of the  visualized spine.      LOWER CHEST: Faint linear and dependent densities in each lung base.  No significant pleural effusion in the included areas.      Impression: Postsurgical changes of the stomach. No evidence of bowel obstruction  or free fluid. Mild stranding in the mesentery similar to 09/04/2024.      Small hiatal and ventral hernias.      Hypodensities arising from both kidneys, probable cysts although not  fully characterized on single phase exam.      Additional findings as described above.      MACRO:  None.      Signed by: Mone Platt 10/29/2024 3:31 PM  Dictation workstation:   [id number]  CT head wo IV contrast  Narrative: Interpreted By:  Mone Platt,   STUDY:  CT HEAD WO IV CONTRAST;  10/29/2024 2:42 pm      INDICATION:  Signs/Symptoms:confusion, dizziness.          COMPARISON:  11/29/2023      ACCESSION NUMBER(S):  [id number]      ORDERING CLINICIAN:  BEBA URIBE      TECHNIQUE:  Unenhanced CT images of the head were obtained.      FINDINGS:  The ventricles, cisterns and sulci are prominent, consistent with  diffuse volume loss. There are areas of nonspecific white matter  hypodensity, which are probably age related or microvascular in  nature. These findings are similar to  the previous exam. There is no  acute intracranial hemorrhage, mass effect or midline shift. No  extraaxial fluid collection.      No focal calvarial lesion.      Partial opacification of right ethmoid sinus. Remaining visualized  paranasal sinuses are clear.              Impression: No acute intracranial hemorrhage or mass-effect.      Right ethmoid sinus disease.      MACRO:  None.      Signed by: Mone Platt 10/29/2024 3:10 PM  Dictation workstation:   [id number]  ECG 12 lead  Sinus bradycardia  Otherwise normal ECG  When compared with ECG of 29-NOV-2023 14:15,  Previous ECG has undetermined rhythm, needs review  XR chest 1 view  Narrative: Interpreted By:  Rehana Zepeda,   STUDY:  XR CHEST 1 VIEW;  10/29/2024 10:02 am      INDICATION:  Signs/Symptoms:hypoglycemia.      COMPARISON:  11/29/2023      ACCESSION NUMBER(S):  [id number]      ORDERING CLINICIAN:  JUVE VAUGHAN      FINDINGS:  CARDIOMEDIASTINAL SILHOUETTE:  The heart is enlarged but unchanged.          LUNGS:  Lungs are clear.      ABDOMEN:  No remarkable upper abdominal findings.          BONES:  No acute osseous changes.  Status post ORIF left mid clavicular fracture.  There are bilateral humeral head prostheses.  Status post ventral fusion lower cervical spine.      Impression: No acute cardiopulmonary process.      MACRO:  None      Signed by: Rehana Zepeda 10/29/2024 10:07 AM  Dictation workstation:   [id number]      Physical Exam deferred as pt is very uncomfortable    Relevant Results  Scheduled medications  amLODIPine, 10 mg, oral, Daily  atorvastatin, 10 mg, oral, Nightly  enoxaparin, 40 mg, subcutaneous, q24h  levothyroxine, 75 mcg, oral, Daily before breakfast  pantoprazole, 20 mg, oral, Daily before breakfast  polyethylene glycol, 17 g, oral, Daily      Continuous medications     PRN medications  PRN medications: calcium carbonate, dextrose, dextrose, ondansetron    Results for orders placed or performed during the hospital encounter  of 10/29/24 (from the past 24 hours)   POCT GLUCOSE   Result Value Ref Range    POCT Glucose 90 74 - 99 mg/dL   Insulin, Random   Result Value Ref Range    Insulin 12 3 - 25 uIU/mL   C-Peptide   Result Value Ref Range    C-Peptide 7.6 (H) 0.7 - 3.9 ng/mL   Insulin, Random   Result Value Ref Range    Insulin 11 3 - 25 uIU/mL   Glucose, random   Result Value Ref Range    Glucose 82 74 - 99 mg/dL   Beta Hydroxybutyrate   Result Value Ref Range    Beta-Hydroxybutyrate 0.13 0.02 - 0.27 mmol/L   Lavender Top   Result Value Ref Range    Extra Tube Hold for add-ons.    PST Top   Result Value Ref Range    Extra Tube Hold for add-ons.      *Note: Due to a large number of results and/or encounters for the requested time period, some results have not been displayed. A complete set of results can be found in Results Review.            Assessment/Plan      Marissa Mosquera is a 70 y/o Female PMH: neuroendocrine tumor, hx hypoglycemia, cervical spinal stenosis, post-menopausal,premature coronary artery disease and stroke, allergic rhinitis, s/p bariatric surgery, HTN, cervical myelopathy and radiculopathy, gout, HTN, vitamin D deficiency, hx daytime somnolence, presenting to the ED with complaints of hypoglycemia, and abdominal pain. Patient endorses two episodes where her blood glucose suddenly decreased. Patient endorses losing consciousness during this time frame. 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 Patient subsequently admitted to observation for further monitoring of above symptoms and endocrinology consult.        ED Course:  EKG on arrival: sinus bradycardia hr 58  CXR: No acute cardiopulmonary process.   Glucose: 99  Potassium: 4.6  Chloride : 109  Creatinine : 1.12  TSH : 1.95  WBC: 6.2  H.4  ED Medications Administered:   N/A     #hypoglycemia with unclear etiology  #recently diagnosed with  neuroendocrine tumor  -hypoglycemia protocol   -accucheck per protocol  -hga1c, add on lipid panel  -Consult Endocrinology; appreciate recs  >>3 hour glucose tolerance test with collection of labs every hour (cpeptide, insulin, glucose)  >>if CGM glucose is 55 or below: glucose, insulin, c-peptide, proinsulin, and b. Hydroxybutyrate     #abdominal pain  -CT a/p Postsurgical changes of the stomach. No evidence of bowel obstruction or free fluid. Mild stranding in the mesentery similar to 09/04/2024. Small hiatal and ventral hernias. Hypodensities arising from both kidneys, probable cysts although not  fully characterized on single phase exam.  -PRN pain control  -PRN antiemetics     #lost of consciousness  Likely 2/2 hypoglycemia  -CTH negative  -Telemetry  -EKG on arrival : EKG on arrival: sinus bradycardia hr 58.  -last tte (09/03/24) ef 61%.  -Consider Zio /Holter on dc   -Consider Cardiology consult if warranted     #HTN  -Continue home medications  -monitor     #DVT prophylaxis  -scds     DC plan:  DC home when medically stable      Alayna Santacruz MD

## 2024-10-31 ENCOUNTER — TELEPHONE (OUTPATIENT)
Dept: GASTROENTEROLOGY | Facility: HOSPITAL | Age: 69
End: 2024-10-31
Payer: MEDICARE

## 2024-10-31 VITALS
BODY MASS INDEX: 36.42 KG/M2 | SYSTOLIC BLOOD PRESSURE: 133 MMHG | OXYGEN SATURATION: 98 % | HEIGHT: 61 IN | WEIGHT: 192.9 LBS | RESPIRATION RATE: 17 BRPM | DIASTOLIC BLOOD PRESSURE: 73 MMHG | HEART RATE: 63 BPM | TEMPERATURE: 97.3 F

## 2024-10-31 DIAGNOSIS — K31.89 DUODENAL NODULE: Primary | ICD-10-CM

## 2024-10-31 LAB
ALBUMIN SERPL BCP-MCNC: 3.8 G/DL (ref 3.4–5)
ALP SERPL-CCNC: 79 U/L (ref 33–136)
ALT SERPL W P-5'-P-CCNC: 11 U/L (ref 7–45)
ANION GAP SERPL CALC-SCNC: 10 MMOL/L (ref 10–20)
AST SERPL W P-5'-P-CCNC: 12 U/L (ref 9–39)
B-OH-BUTYR SERPL-SCNC: 0.25 MMOL/L (ref 0.02–0.27)
BASOPHILS # BLD AUTO: 0.06 X10*3/UL (ref 0–0.1)
BASOPHILS NFR BLD AUTO: 1 %
BILIRUB SERPL-MCNC: 0.6 MG/DL (ref 0–1.2)
BUN SERPL-MCNC: 20 MG/DL (ref 6–23)
C PEPTIDE SERPL-MCNC: 2.5 NG/ML (ref 0.7–3.9)
CALCIUM SERPL-MCNC: 8.9 MG/DL (ref 8.6–10.3)
CHLORIDE SERPL-SCNC: 107 MMOL/L (ref 98–107)
CO2 SERPL-SCNC: 28 MMOL/L (ref 21–32)
CREAT SERPL-MCNC: 1.24 MG/DL (ref 0.5–1.05)
EGFRCR SERPLBLD CKD-EPI 2021: 47 ML/MIN/1.73M*2
EOSINOPHIL # BLD AUTO: 0.27 X10*3/UL (ref 0–0.7)
EOSINOPHIL NFR BLD AUTO: 4.6 %
ERYTHROCYTE [DISTWIDTH] IN BLOOD BY AUTOMATED COUNT: 13.8 % (ref 11.5–14.5)
GLUCOSE 1H P 75 G GLC PO SERPL-MCNC: 89 MG/DL
GLUCOSE SERPL-MCNC: 89 MG/DL (ref 74–99)
HCT VFR BLD AUTO: 40.9 % (ref 36–46)
HGB BLD-MCNC: 13.5 G/DL (ref 12–16)
HOLD SPECIMEN: NORMAL
IMM GRANULOCYTES # BLD AUTO: 0.01 X10*3/UL (ref 0–0.7)
IMM GRANULOCYTES NFR BLD AUTO: 0.2 % (ref 0–0.9)
INSULIN SERPL-ACNC: 6 UIU/ML (ref 3–25)
LYMPHOCYTES # BLD AUTO: 1.58 X10*3/UL (ref 1.2–4.8)
LYMPHOCYTES NFR BLD AUTO: 26.7 %
MCH RBC QN AUTO: 29 PG (ref 26–34)
MCHC RBC AUTO-ENTMCNC: 33 G/DL (ref 32–36)
MCV RBC AUTO: 88 FL (ref 80–100)
MONOCYTES # BLD AUTO: 0.49 X10*3/UL (ref 0.1–1)
MONOCYTES NFR BLD AUTO: 8.3 %
NEUTROPHILS # BLD AUTO: 3.51 X10*3/UL (ref 1.2–7.7)
NEUTROPHILS NFR BLD AUTO: 59.2 %
NRBC BLD-RTO: 0 /100 WBCS (ref 0–0)
PLATELET # BLD AUTO: 227 X10*3/UL (ref 150–450)
POTASSIUM SERPL-SCNC: 3.8 MMOL/L (ref 3.5–5.3)
PROT SERPL-MCNC: 6 G/DL (ref 6.4–8.2)
RBC # BLD AUTO: 4.66 X10*6/UL (ref 4–5.2)
SODIUM SERPL-SCNC: 141 MMOL/L (ref 136–145)
WBC # BLD AUTO: 5.9 X10*3/UL (ref 4.4–11.3)

## 2024-10-31 PROCEDURE — 82565 ASSAY OF CREATININE: CPT | Performed by: STUDENT IN AN ORGANIZED HEALTH CARE EDUCATION/TRAINING PROGRAM

## 2024-10-31 PROCEDURE — 1200000002 HC GENERAL ROOM WITH TELEMETRY DAILY

## 2024-10-31 PROCEDURE — 84681 ASSAY OF C-PEPTIDE: CPT | Mod: AHULAB | Performed by: STUDENT IN AN ORGANIZED HEALTH CARE EDUCATION/TRAINING PROGRAM

## 2024-10-31 PROCEDURE — 85025 COMPLETE CBC W/AUTO DIFF WBC: CPT | Performed by: STUDENT IN AN ORGANIZED HEALTH CARE EDUCATION/TRAINING PROGRAM

## 2024-10-31 PROCEDURE — 84206 ASSAY OF PROINSULIN: CPT | Performed by: STUDENT IN AN ORGANIZED HEALTH CARE EDUCATION/TRAINING PROGRAM

## 2024-10-31 PROCEDURE — 2500000004 HC RX 250 GENERAL PHARMACY W/ HCPCS (ALT 636 FOR OP/ED): Performed by: STUDENT IN AN ORGANIZED HEALTH CARE EDUCATION/TRAINING PROGRAM

## 2024-10-31 PROCEDURE — 99239 HOSP IP/OBS DSCHRG MGMT >30: CPT | Performed by: STUDENT IN AN ORGANIZED HEALTH CARE EDUCATION/TRAINING PROGRAM

## 2024-10-31 PROCEDURE — 36415 COLL VENOUS BLD VENIPUNCTURE: CPT | Performed by: STUDENT IN AN ORGANIZED HEALTH CARE EDUCATION/TRAINING PROGRAM

## 2024-10-31 PROCEDURE — 82950 GLUCOSE TEST: CPT | Performed by: INTERNAL MEDICINE

## 2024-10-31 PROCEDURE — 82010 KETONE BODYS QUAN: CPT | Performed by: STUDENT IN AN ORGANIZED HEALTH CARE EDUCATION/TRAINING PROGRAM

## 2024-10-31 PROCEDURE — 83525 ASSAY OF INSULIN: CPT | Mod: AHULAB | Performed by: STUDENT IN AN ORGANIZED HEALTH CARE EDUCATION/TRAINING PROGRAM

## 2024-10-31 PROCEDURE — 84075 ASSAY ALKALINE PHOSPHATASE: CPT | Performed by: STUDENT IN AN ORGANIZED HEALTH CARE EDUCATION/TRAINING PROGRAM

## 2024-10-31 ASSESSMENT — COGNITIVE AND FUNCTIONAL STATUS - GENERAL
DAILY ACTIVITIY SCORE: 24
CLIMB 3 TO 5 STEPS WITH RAILING: A LITTLE
WALKING IN HOSPITAL ROOM: A LITTLE
MOBILITY SCORE: 22
DAILY ACTIVITIY SCORE: 24
WALKING IN HOSPITAL ROOM: A LITTLE
MOBILITY SCORE: 22
CLIMB 3 TO 5 STEPS WITH RAILING: A LITTLE

## 2024-10-31 ASSESSMENT — PAIN SCALES - GENERAL
PAINLEVEL_OUTOF10: 0 - NO PAIN
PAINLEVEL_OUTOF10: 0 - NO PAIN

## 2024-10-31 NOTE — PROGRESS NOTES
"Marissa Mosquera \"Harman or Ms Mosquera\" is a 69 y.o. female on day 0 of admission presenting with Hypoglycemia.      Subjective   NAEO. Pt and  at bedside this morning. States that she did not hit glucose 55 last night so no conditional labs drawn. She is frustrated that she is still in observation. Had several questions for the endocrinologist today.        Objective     Last Recorded Vitals  /79 (BP Location: Right arm, Patient Position: Lying)   Pulse 60   Temp 36.5 °C (97.7 °F) (Temporal)   Resp 17   Wt 87.5 kg (192 lb 14.4 oz)   SpO2 95%   Intake/Output last 3 Shifts:    Intake/Output Summary (Last 24 hours) at 10/31/2024 1042  Last data filed at 10/31/2024 0903  Gross per 24 hour   Intake 500 ml   Output --   Net 500 ml       Admission Weight  Weight: 87.5 kg (193 lb) (10/29/24 0811)    Daily Weight  10/30/24 : 87.5 kg (192 lb 14.4 oz)    Image Results  CT abdomen pelvis w IV contrast  Narrative: Interpreted By:  Mone Platt,   STUDY:  CT ABDOMEN PELVIS W IV CONTRAST;  10/29/2024 2:42 pm      INDICATION:  Signs/Symptoms:abdominal pain.          COMPARISON:  PET-CT 09/04/2024      ACCESSION NUMBER(S):  [id number]      ORDERING CLINICIAN:  ELDER MC      TECHNIQUE:  CT of the abdomen and pelvis was performed. Sagittal and coronal  reconstructions were generated.  75 ML Omnipaque 350 intravenous  contrast given for the exam.      FINDINGS:          ABDOMINAL ORGANS:      LIVER: No focal lesion      GALL BLADDER AND BILIARY TREE: No calcified gallstone      SPLEEN: No focal lesion      PANCREAS: No focal lesion      ADRENALS: No adrenal mass      KIDNEYS AND URETERS: Rounded hypodensities arising from each kidney.  No hydronephrosis within limits of nephrogram phase images.      BOWEL: Small hiatal hernia. Dense surgical material near the GE  junction and stomach. No abnormally dilated large or small bowel  loops.      PERITONEUM, RETROPERITONEUM, NODES: No significant free fluid. " No  free air. No significant retroperitoneal lymphadenopathy. Vague  increased density in the mesentery, similar to the prior exam  allowing for technical differences..      VESSELS:  Multifocal atherosclerotic calcifications. No abdominal  aortic aneurysm.      PELVIS: Urinary bladder is moderately distended without focal wall  thickening. Uterus is grossly normal in contour.      ABDOMINAL WALL: Small fat containing supraumbilical and umbilical  hernias.      BONES: Subcentimeter sclerotic lesion in the left femoral head  similar to the previous exam. Presumed degenerative changes of the  visualized spine.      LOWER CHEST: Faint linear and dependent densities in each lung base.  No significant pleural effusion in the included areas.      Impression: Postsurgical changes of the stomach. No evidence of bowel obstruction  or free fluid. Mild stranding in the mesentery similar to 09/04/2024.      Small hiatal and ventral hernias.      Hypodensities arising from both kidneys, probable cysts although not  fully characterized on single phase exam.      Additional findings as described above.      MACRO:  None.      Signed by: Mone Platt 10/29/2024 3:31 PM  Dictation workstation:   [id number]  CT head wo IV contrast  Narrative: Interpreted By:  Mone Platt,   STUDY:  CT HEAD WO IV CONTRAST;  10/29/2024 2:42 pm      INDICATION:  Signs/Symptoms:confusion, dizziness.          COMPARISON:  11/29/2023      ACCESSION NUMBER(S):  [id number]      ORDERING CLINICIAN:  BEBA URIBE      TECHNIQUE:  Unenhanced CT images of the head were obtained.      FINDINGS:  The ventricles, cisterns and sulci are prominent, consistent with  diffuse volume loss. There are areas of nonspecific white matter  hypodensity, which are probably age related or microvascular in  nature. These findings are similar to the previous exam. There is no  acute intracranial hemorrhage, mass effect or midline shift. No  extraaxial fluid collection.       No focal calvarial lesion.      Partial opacification of right ethmoid sinus. Remaining visualized  paranasal sinuses are clear.              Impression: No acute intracranial hemorrhage or mass-effect.      Right ethmoid sinus disease.      MACRO:  None.      Signed by: Mone Paltt 10/29/2024 3:10 PM  Dictation workstation:   [id number]  ECG 12 lead  Sinus bradycardia  Otherwise normal ECG  When compared with ECG of 29-NOV-2023 14:15,  Previous ECG has undetermined rhythm, needs review  XR chest 1 view  Narrative: Interpreted By:  Rehana Zepeda,   STUDY:  XR CHEST 1 VIEW;  10/29/2024 10:02 am      INDICATION:  Signs/Symptoms:hypoglycemia.      COMPARISON:  11/29/2023      ACCESSION NUMBER(S):  [id number]      ORDERING CLINICIAN:  JUVE VAUGHAN      FINDINGS:  CARDIOMEDIASTINAL SILHOUETTE:  The heart is enlarged but unchanged.          LUNGS:  Lungs are clear.      ABDOMEN:  No remarkable upper abdominal findings.          BONES:  No acute osseous changes.  Status post ORIF left mid clavicular fracture.  There are bilateral humeral head prostheses.  Status post ventral fusion lower cervical spine.      Impression: No acute cardiopulmonary process.      MACRO:  None      Signed by: Rehana Zepeda 10/29/2024 10:07 AM  Dictation workstation:   [id number]      Physical Exam  Constitutional:       General: She is not in acute distress.     Appearance: Normal appearance. She is not ill-appearing.   HENT:      Mouth/Throat:      Mouth: Mucous membranes are moist.   Eyes:      Extraocular Movements: Extraocular movements intact.      Pupils: Pupils are equal, round, and reactive to light.   Cardiovascular:      Rate and Rhythm: Normal rate and regular rhythm.      Heart sounds: No murmur heard.     No friction rub. No gallop.   Pulmonary:      Effort: Pulmonary effort is normal. No respiratory distress.      Breath sounds: Normal breath sounds. No wheezing, rhonchi or rales.   Abdominal:      General: Bowel sounds  are normal. There is no distension.      Palpations: Abdomen is soft. There is no mass.      Tenderness: There is no abdominal tenderness. There is no guarding or rebound.   Musculoskeletal:         General: No swelling, tenderness or deformity.      Cervical back: Normal range of motion and neck supple.   Skin:     General: Skin is warm and dry.      Findings: No bruising or rash.   Neurological:      General: No focal deficit present.      Mental Status: She is alert and oriented to person, place, and time. Mental status is at baseline.      Motor: No weakness.   Psychiatric:         Mood and Affect: Mood normal.         Behavior: Behavior normal.         Thought Content: Thought content normal.         Relevant Results  Scheduled medications  amLODIPine, 10 mg, oral, Daily  atorvastatin, 10 mg, oral, Nightly  enoxaparin, 40 mg, subcutaneous, q24h  levothyroxine, 75 mcg, oral, Daily before breakfast  pantoprazole, 20 mg, oral, Daily before breakfast  polyethylene glycol, 17 g, oral, Daily  sodium chloride, 500 mL, intravenous, Once      Continuous medications     PRN medications  PRN medications: calcium carbonate, dextrose, dextrose, ondansetron    Results for orders placed or performed during the hospital encounter of 10/29/24 (from the past 24 hours)   Glucose, random   Result Value Ref Range    Glucose 175 (H) 74 - 99 mg/dL   Insulin, Random   Result Value Ref Range    Insulin 56 (H) 3 - 25 uIU/mL   C-Peptide   Result Value Ref Range    C-Peptide 14.2 (H) 0.7 - 3.9 ng/mL   Beta Hydroxybutyrate   Result Value Ref Range    Beta-Hydroxybutyrate 0.12 0.02 - 0.27 mmol/L   Glucose, random   Result Value Ref Range    Glucose 109 (H) 74 - 99 mg/dL   Insulin, Random   Result Value Ref Range    Insulin 20 3 - 25 uIU/mL   Insulin, Random   Result Value Ref Range    Insulin 12 3 - 25 uIU/mL   C-Peptide   Result Value Ref Range    C-Peptide 8.2 (H) 0.7 - 3.9 ng/mL   Insulin, Random   Result Value Ref Range    Insulin 14 3  - 25 uIU/mL   Glucose, 1 hour   Result Value Ref Range    Glucose, One hour 89 mg/dL   Beta Hydroxybutyrate   Result Value Ref Range    Beta-Hydroxybutyrate 0.25 0.02 - 0.27 mmol/L   CBC and Auto Differential   Result Value Ref Range    WBC 5.9 4.4 - 11.3 x10*3/uL    nRBC 0.0 0.0 - 0.0 /100 WBCs    RBC 4.66 4.00 - 5.20 x10*6/uL    Hemoglobin 13.5 12.0 - 16.0 g/dL    Hematocrit 40.9 36.0 - 46.0 %    MCV 88 80 - 100 fL    MCH 29.0 26.0 - 34.0 pg    MCHC 33.0 32.0 - 36.0 g/dL    RDW 13.8 11.5 - 14.5 %    Platelets 227 150 - 450 x10*3/uL    Neutrophils % 59.2 40.0 - 80.0 %    Immature Granulocytes %, Automated 0.2 0.0 - 0.9 %    Lymphocytes % 26.7 13.0 - 44.0 %    Monocytes % 8.3 2.0 - 10.0 %    Eosinophils % 4.6 0.0 - 6.0 %    Basophils % 1.0 0.0 - 2.0 %    Neutrophils Absolute 3.51 1.20 - 7.70 x10*3/uL    Immature Granulocytes Absolute, Automated 0.01 0.00 - 0.70 x10*3/uL    Lymphocytes Absolute 1.58 1.20 - 4.80 x10*3/uL    Monocytes Absolute 0.49 0.10 - 1.00 x10*3/uL    Eosinophils Absolute 0.27 0.00 - 0.70 x10*3/uL    Basophils Absolute 0.06 0.00 - 0.10 x10*3/uL   Comprehensive Metabolic Panel   Result Value Ref Range    Glucose 89 74 - 99 mg/dL    Sodium 141 136 - 145 mmol/L    Potassium 3.8 3.5 - 5.3 mmol/L    Chloride 107 98 - 107 mmol/L    Bicarbonate 28 21 - 32 mmol/L    Anion Gap 10 10 - 20 mmol/L    Urea Nitrogen 20 6 - 23 mg/dL    Creatinine 1.24 (H) 0.50 - 1.05 mg/dL    eGFR 47 (L) >60 mL/min/1.73m*2    Calcium 8.9 8.6 - 10.3 mg/dL    Albumin 3.8 3.4 - 5.0 g/dL    Alkaline Phosphatase 79 33 - 136 U/L    Total Protein 6.0 (L) 6.4 - 8.2 g/dL    AST 12 9 - 39 U/L    Bilirubin, Total 0.6 0.0 - 1.2 mg/dL    ALT 11 7 - 45 U/L   PST Top   Result Value Ref Range    Extra Tube Hold for add-ons.      *Note: Due to a large number of results and/or encounters for the requested time period, some results have not been displayed. A complete set of results can be found in Results Review.            Assessment/Plan       Marissa Mosquera is a 68 y/o Female PMH: neuroendocrine tumor, hx hypoglycemia, cervical spinal stenosis, post-menopausal,premature coronary artery disease and stroke, allergic rhinitis, s/p bariatric surgery, HTN, cervical myelopathy and radiculopathy, gout, HTN, vitamin D deficiency, hx daytime somnolence, presenting to the ED with complaints of hypoglycemia, and abdominal pain. Patient endorses two episodes where her blood glucose suddenly decreased. Patient endorses losing consciousness during this time frame. 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 Patient subsequently admitted to observation for further monitoring of above symptoms and endocrinology consult.        ED Course:  EKG on arrival: sinus bradycardia hr 58  CXR: No acute cardiopulmonary process.   Glucose: 99  Potassium: 4.6  Chloride : 109  Creatinine : 1.12  TSH : 1.95  WBC: 6.2  H.4  ED Medications Administered:   N/A     #hypoglycemia with unclear etiology  #recently diagnosed with neuroendocrine tumor  -hypoglycemia protocol   -accucheck per protocol  -hga1c, add on lipid panel  -Consult Endocrinology; appreciate recs  >>3 hour glucose tolerance test with collection of labs every hour (cpeptide, insulin, glucose). This was completed 10/30 around noon  >>72 hour fast per endocrinology started 3pm on 10/30/24  ---10/31: continuing with this today unless otherwise advised by endocrinology. It is medically necessary for patient to stay inpatient for this 72 hour fast, as per endocrinology specialists, as they are concerned that she would decompensate at home if done at home. Doing this out of the hospital would be a patient safely issue.  >>if CGM glucose is 55 or below during fast: glucose, insulin, c-peptide, proinsulin, and b. Hydroxybutyrate     # Elevated Cr  -Around baseline but slightly increased  -Hydrating with  fluids    #abdominal pain  -CT a/p Postsurgical changes of the stomach. No evidence of bowel obstruction or free fluid. Mild stranding in the mesentery similar to 09/04/2024. Small hiatal and ventral hernias. Hypodensities arising from both kidneys, probable cysts although not  fully characterized on single phase exam.  -PRN pain control  -PRN antiemetics     #lost of consciousness  Likely 2/2 hypoglycemia  -CTH negative  -Telemetry  -EKG on arrival : EKG on arrival: sinus bradycardia hr 58.  -last tte (09/03/24) ef 61%.  -Consider Zio /Holter on dc   -Consider Cardiology consult if warranted     #HTN  -Continue home medications  -monitor     #DVT prophylaxis  -scds     DC plan:  DC home when medically stable  -Upgraded to inpatient status      Malnutrition Diagnosis Status: New  Malnutrition Diagnosis: Mild malnutrition related to acute disease or injury  As Evidenced by: pt report self-induced minimal oral intake for greater than five days prior to admission  I agree with the dietitian's malnutrition diagnosis.         Alayna Santacruz MD

## 2024-10-31 NOTE — CARE PLAN
The patient's goals for the shift include      The clinical goals for the shift include pt to remain hds      Problem: Fall/Injury  Goal: Not fall by end of shift  Outcome: Progressing  Goal: Be free from injury by end of the shift  Outcome: Progressing  Goal: Verbalize understanding of personal risk factors for fall in the hospital  Outcome: Progressing  Goal: Verbalize understanding of risk factor reduction measures to prevent injury from fall in the home  Outcome: Progressing  Goal: Use assistive devices by end of the shift  Outcome: Progressing  Goal: Pace activities to prevent fatigue by end of the shift  Outcome: Progressing     Problem: Pain - Adult  Goal: Verbalizes/displays adequate comfort level or baseline comfort level  Outcome: Progressing     Problem: Safety - Adult  Goal: Free from fall injury  Outcome: Progressing     Problem: Discharge Planning  Goal: Discharge to home or other facility with appropriate resources  Outcome: Progressing     Problem: Chronic Conditions and Co-morbidities  Goal: Patient's chronic conditions and co-morbidity symptoms are monitored and maintained or improved  Outcome: Progressing     Problem: Nutrition  Goal: Oral intake greater 75%  Outcome: Progressing  Goal: Adequate PO fluid intake  Outcome: Progressing  Goal: BG  mg/dL  Outcome: Progressing  Goal: Lab values WNL  Outcome: Progressing  Goal: Electrolytes WNL  Outcome: Progressing  Goal: Maintain stable weight  Outcome: Progressing

## 2024-10-31 NOTE — PROGRESS NOTES
10/31/24 1035   Discharge Planning   Home or Post Acute Services None   Expected Discharge Disposition Home     Spoke with patient at bedside to answer her questions about insurance--inpatient vs obs.  Explained how [facility] reviews patients and is responsible for upgrade.  Patient feels that she should have been upgraded as of midnight on 10/31/24.  Patient was not satisfied with the answers I provided.  I asked [facility] to call patient to explain further.  Will continue to follow for discharge planning needs.

## 2024-10-31 NOTE — DISCHARGE SUMMARY
"Discharge Diagnosis  Hypoglycemia    Issues Requiring Follow-Up  Hypoglycemic episodes- Dr. Trent    Discharge Meds     Medication List      CONTINUE taking these medications     amLODIPine 10 mg tablet; Commonly known as: Norvasc; Take 1 tablet (10   mg) by mouth once daily.   atorvastatin 10 mg tablet; Commonly known as: Lipitor; TAKE 1 TABLET BY   MOUTH EVERY DAY   calcium carbonate 200 mg calcium chewable tablet; Commonly known as:   Tums   FreeStyle Sophy 3 Sensor device; Generic drug: blood-glucose sensor;   Change sensor every 2 weeks   levothyroxine 75 mcg tablet; Commonly known as: Synthroid, Levoxyl; TAKE   1 TABLET BY MOUTH EVERY DAY   omeprazole OTC 20 mg EC tablet; Commonly known as: PriLOSEC OTC   ondansetron 4 mg tablet; Commonly known as: Zofran; Take 2 tablets (8   mg) by mouth 2 times a day as needed for nausea or vomiting.       Test Results Pending At Discharge  Pending Labs       Order Current Status    Glucose Tolerance Test, 3 hour (Non-Pregnancy) In process    Proinsulin In process    Proinsulin In process    Proinsulin In process            Hospital Course    Per admission HPI \"Marissa Mosquera is a 68 y/o Female PMH: neuroendocrine tumor, hx hypoglycemia, cervical spinal stenosis, post-menopausal,premature coronary artery disease and stroke, allergic rhinitis, s/p bariatric surgery, HTN, cervical myelopathy and radiculopathy, gout, HTN, vitamin D deficiency, hx daytime somnolence, presenting to the ED with complaints of hypoglycemia, and abdominal pain. Patient endorses two episodes where her blood glucose suddenly decreased. Patient endorses losing consciousness during this time frame. 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 Patient subsequently admitted to observation for further monitoring of above symptoms and endocrinology consult.\"    Attempts were made to induce hypoglycemia " "(glucose<55 on CGM) in order to rule in/out an insulinoma: glucose, insulin. c-peptide, proinsulin, beta-hydroxybutyrate. This was done with eating normal meals, glucose tolerance test (100g drink), and 24 hour fast. Although pt was symptomatic at times throughout these tests, her glucose did not drop to 55 or below in order to be able to obtain the appropriate labs. Per Dr. Trent, \"Insulinoma will be evident within 24 hours' fasting about 2/3 of the time. Without documentation of hypoglycemia and concurrent insulinoma, we have no diagnosis of insulinoma or non-insulinoma pancreatogenic hypoglycemia syndrome.\" Dr. Trent also offered her a trial of acarbose which pt declined.     *Please see Dr. Trent's very detailed significant event note from 10/31/24 at 12:19pm for a summary of events    As pt did not become hypoglycemic with eating normal meals x3, glucose tolerance test, and fasting, she chose to go home and plans to follow up with Dr. Trent and her other specialists outpatient. She was discharged home safely on 10/31 without any changes in medication.    Pertinent Physical Exam At Time of Discharge  Physical Exam  Constitutional:       General: She is not in acute distress.     Appearance: Normal appearance. She is not ill-appearing.   HENT:      Mouth/Throat:      Mouth: Mucous membranes are moist.   Eyes:      Extraocular Movements: Extraocular movements intact.      Pupils: Pupils are equal, round, and reactive to light.   Cardiovascular:      Rate and Rhythm: Normal rate and regular rhythm.      Heart sounds: No murmur heard.     No friction rub. No gallop.   Pulmonary:      Effort: Pulmonary effort is normal. No respiratory distress.      Breath sounds: Normal breath sounds. No wheezing, rhonchi or rales.   Abdominal:      General: Bowel sounds are normal. There is no distension.      Palpations: Abdomen is soft. There is no mass.      Tenderness: There is no abdominal tenderness. There is no guarding " or rebound.   Musculoskeletal:         General: No swelling, tenderness or deformity.      Cervical back: Normal range of motion and neck supple.   Skin:     General: Skin is warm and dry.      Findings: No bruising or rash.   Neurological:      General: No focal deficit present.      Mental Status: She is alert and oriented to person, place, and time. Mental status is at baseline.      Motor: No weakness.   Psychiatric:         Mood and Affect: Mood normal.         Behavior: Behavior normal.         Thought Content: Thought content normal.         Outpatient Follow-Up  Future Appointments   Date Time Provider Department Center   11/6/2024 11:30 AM Bandar Kay MD [id number] [facility]   4/1/2025 11:00 AM Marta Storey MD [id number] [facility]   4/11/2025 10:15 AM Jose BRIGGS MD [id number] [facility]         Alayna Santacruz MD    I spent >30 minutes in the discharge planning and overall professional care of this patient.

## 2024-10-31 NOTE — CARE PLAN
The patient's goals for the shift include  get results of the glucose tolerance test    The clinical goals for the shift include Pt will remain HDS throughout the shift      Problem: Fall/Injury  Goal: Not fall by end of shift  Outcome: Progressing     Problem: Pain - Adult  Goal: Verbalizes/displays adequate comfort level or baseline comfort level  Outcome: Progressing     Problem: Nutrition  Goal: Oral intake greater 75%  Outcome: Progressing     Problem: Chronic Conditions and Co-morbidities  Goal: Patient's chronic conditions and co-morbidity symptoms are monitored and maintained or improved  Outcome: Progressing

## 2024-11-01 ENCOUNTER — TELEPHONE (OUTPATIENT)
Dept: PRIMARY CARE | Facility: CLINIC | Age: 69
End: 2024-11-01
Payer: MEDICARE

## 2024-11-01 ENCOUNTER — PATIENT OUTREACH (OUTPATIENT)
Dept: PRIMARY CARE | Facility: CLINIC | Age: 69
End: 2024-11-01
Payer: MEDICARE

## 2024-11-01 LAB
ATRIAL RATE: 58 BPM
P AXIS: 34 DEGREES
P OFFSET: 202 MS
P ONSET: 140 MS
PR INTERVAL: 154 MS
Q ONSET: 217 MS
QRS COUNT: 10 BEATS
QRS DURATION: 88 MS
QT INTERVAL: 396 MS
QTC CALCULATION(BAZETT): 388 MS
QTC FREDERICIA: 391 MS
R AXIS: 36 DEGREES
T AXIS: 24 DEGREES
T OFFSET: 415 MS
VENTRICULAR RATE: 58 BPM

## 2024-11-01 RX ORDER — ESOMEPRAZOLE MAGNESIUM 40 MG/1
40 CAPSULE, DELAYED RELEASE ORAL
COMMUNITY
End: 2024-11-06 | Stop reason: SDUPTHER

## 2024-11-04 LAB — PROINSULIN P 12H FAST SERPL-SCNC: 8.5 PMOL/L

## 2024-11-05 NOTE — H&P
"History Of Present Illness  Marissa Mosquera \"Harman or Ms Mosquera\" is a 69 y.o. female presenting with duodenal tumor.     Past Medical History  Past Medical History:   Diagnosis Date    Anesthesia of skin 04/20/2015    Finger numbness    Arthritis 2008    Cataract 2018    Chronic kidney disease 2023    Conjunctival cysts, left eye 08/14/2018    Conjunctival cyst of left eye    Disease of thyroid gland 1985    GERD (gastroesophageal reflux disease) 1976    Hypertension 2015    Hypertensive retinopathy, bilateral 08/20/2019    Hypertensive retinopathy of both eyes    Other conditions influencing health status 02/15/2017    Compression fracture    Peptic ulceration 2023    Personal history of diseases of the skin and subcutaneous tissue 11/01/2018    History of urticaria    Personal history of other diseases of the circulatory system 11/01/2018    History of hypertension    Personal history of other specified conditions 10/31/2018    History of diarrhea    Personal history of other specified conditions 07/27/2015    History of vertigo    PONV (postoperative nausea and vomiting)     Retinal hemorrhage, left eye 02/13/2018    Retinal hemorrhage of left eye    Varicella 1957    Visual impairment 1963     Surgical History  Past Surgical History:   Procedure Laterality Date    BREAST BIOPSY      rt benign core    BREAST SURGERY  2007    COLONOSCOPY  06/08/2016    Complete Colonoscopy    CT ANGIO NECK  06/28/2018    CT NECK ANGIO W AND WO IV CONTRAST 6/28/2018 [facility]    CT ANGIO NECK  08/29/2022    CT NECK ANGIO W AND WO IV CONTRAST 8/29/2022 [facility]    CT HEAD ANGIO W AND WO IV CONTRAST  06/28/2018    CT HEAD ANGIO W AND WO IV CONTRAST 6/28/2018 [facility]    CT HEAD ANGIO W AND WO IV CONTRAST  08/29/2022    CT HEAD ANGIO W AND WO IV CONTRAST 8/29/2022 [facility]    FRACTURE SURGERY  1980    GASTRIC FUNDOPLICATION  06/29/2017    Esophagogastric Fundoplasty Nissen Fundoplication    JOINT " REPLACEMENT  2011, 2021,2022    MR HEAD ANGIO WO IV CONTRAST  09/29/2021    MR HEAD ANGIO WO IV CONTRAST 9/29/2021 [facility]    OTHER SURGICAL HISTORY  07/17/2017    Esophagogastric Fundoplasty Laparoscopic For Paraesophageal Hernia Repair    OTHER SURGICAL HISTORY  07/17/2017    Gastroplasty Longitudinal    OTHER SURGICAL HISTORY  07/17/2017    Esophageal Lengthening    OTHER SURGICAL HISTORY  07/17/2017    Repair Of Paraesophageal Hiatus Hernia    OTHER SURGICAL HISTORY  12/18/2017    Laparoscopy Repair Of Hernia    OTHER SURGICAL HISTORY  03/05/2019    Shoulder replacement    OTHER SURGICAL HISTORY  04/22/2015    Treatment Of Wrist Fracture    OTHER SURGICAL HISTORY  04/22/2015    Open Treatment Of Clavicular Fracture    OTHER SURGICAL HISTORY  04/22/2015    Breast Surgery Excision Of Breast Single Lesion    OTHER SURGICAL HISTORY  04/22/2015    Parathyroid Complete Parathyroidectomy    SMALL INTESTINE SURGERY  11/2016    SPINE SURGERY  2019    TOTAL KNEE ARTHROPLASTY  04/22/2015    Knee Replacement    TOTAL SHOULDER ARTHROPLASTY Bilateral      Social History  She reports that she has never smoked. She has never used smokeless tobacco. She reports that she does not currently use alcohol. She reports that she does not use drugs.    Family History  Family History   Problem Relation Name Age of Onset    Heart failure Mother Elisabet     Hypertension Mother Elisabet     Arthritis Mother Elisabet     Depression Mother Elisabet     Early natural death Mother Elisabet     Stroke Mother Elisabet     Heart failure Father Vernon     Heart disease Father Vernon     Other (Sinus problem) Brother      Cancer Other Grandfather     Cancer Maternal Grandfather Jose     Early natural death Maternal Grandfather Reynaldojorge     Stroke Maternal Grandfather Jose     Alcohol abuse Brother Mario     Diabetes Brother Mario     Hypertension Brother Mario     Alcohol abuse Brother Junior     Drug abuse Brother Junior     Stroke Mother's Sister Katharine          Allergies  No Known Allergies  Review of Systems     Physical Exam  Vitals and nursing note reviewed.   Constitutional:       Appearance: Normal appearance.   Cardiovascular:      Rate and Rhythm: Normal rate and regular rhythm.      Pulses: Normal pulses.      Heart sounds: Normal heart sounds.   Pulmonary:      Effort: Pulmonary effort is normal.      Breath sounds: Normal breath sounds.   Abdominal:      General: Abdomen is flat.      Palpations: Abdomen is soft.   Neurological:      Mental Status: She is alert.          Last Recorded Vitals  There were no vitals taken for this visit.    Assessment/Plan   Duodenal polyp    Proceed with EMR     Pavel Garcia MD

## 2024-11-06 ENCOUNTER — ANESTHESIA (OUTPATIENT)
Dept: GASTROENTEROLOGY | Facility: HOSPITAL | Age: 69
End: 2024-11-06
Payer: MEDICARE

## 2024-11-06 ENCOUNTER — ANESTHESIA EVENT (OUTPATIENT)
Dept: GASTROENTEROLOGY | Facility: HOSPITAL | Age: 69
End: 2024-11-06
Payer: MEDICARE

## 2024-11-06 ENCOUNTER — HOSPITAL ENCOUNTER (OUTPATIENT)
Dept: GASTROENTEROLOGY | Facility: HOSPITAL | Age: 69
Discharge: HOME | End: 2024-11-06
Payer: MEDICARE

## 2024-11-06 ENCOUNTER — APPOINTMENT (OUTPATIENT)
Dept: ENDOCRINOLOGY | Facility: CLINIC | Age: 69
End: 2024-11-06
Payer: MEDICARE

## 2024-11-06 VITALS
RESPIRATION RATE: 17 BRPM | TEMPERATURE: 97.9 F | WEIGHT: 192.9 LBS | OXYGEN SATURATION: 99 % | DIASTOLIC BLOOD PRESSURE: 61 MMHG | HEIGHT: 63 IN | SYSTOLIC BLOOD PRESSURE: 127 MMHG | BODY MASS INDEX: 34.18 KG/M2 | HEART RATE: 57 BPM

## 2024-11-06 DIAGNOSIS — K31.89 DUODENAL NODULE: ICD-10-CM

## 2024-11-06 DIAGNOSIS — K31.89 DUODENAL NODULE: Primary | ICD-10-CM

## 2024-11-06 LAB
PROINSULIN P 12H FAST SERPL-SCNC: 2.1 PMOL/L
PROINSULIN P 12H FAST SERPL-SCNC: 6.1 PMOL/L

## 2024-11-06 PROCEDURE — 7100000009 HC PHASE TWO TIME - INITIAL BASE CHARGE

## 2024-11-06 PROCEDURE — 7100000010 HC PHASE TWO TIME - EACH INCREMENTAL 1 MINUTE

## 2024-11-06 PROCEDURE — 3700000001 HC GENERAL ANESTHESIA TIME - INITIAL BASE CHARGE

## 2024-11-06 PROCEDURE — A43235 PR ESOPHAGOGASTRODUODENOSCOPY TRANSORAL DIAGNOSTIC: Performed by: NURSE ANESTHETIST, CERTIFIED REGISTERED

## 2024-11-06 PROCEDURE — A43235 PR ESOPHAGOGASTRODUODENOSCOPY TRANSORAL DIAGNOSTIC: Performed by: ANESTHESIOLOGY

## 2024-11-06 PROCEDURE — 3700000002 HC GENERAL ANESTHESIA TIME - EACH INCREMENTAL 1 MINUTE

## 2024-11-06 PROCEDURE — 2500000004 HC RX 250 GENERAL PHARMACY W/ HCPCS (ALT 636 FOR OP/ED): Performed by: NURSE ANESTHETIST, CERTIFIED REGISTERED

## 2024-11-06 PROCEDURE — 2720000007 HC OR 272 NO HCPCS

## 2024-11-06 PROCEDURE — 43235 EGD DIAGNOSTIC BRUSH WASH: CPT | Performed by: INTERNAL MEDICINE

## 2024-11-06 RX ORDER — ONDANSETRON HYDROCHLORIDE 2 MG/ML
INJECTION, SOLUTION INTRAVENOUS AS NEEDED
Status: DISCONTINUED | OUTPATIENT
Start: 2024-11-06 | End: 2024-11-06

## 2024-11-06 RX ORDER — PROPOFOL 10 MG/ML
INJECTION, EMULSION INTRAVENOUS AS NEEDED
Status: DISCONTINUED | OUTPATIENT
Start: 2024-11-06 | End: 2024-11-06

## 2024-11-06 RX ORDER — LIDOCAINE HYDROCHLORIDE 20 MG/ML
INJECTION, SOLUTION INFILTRATION; PERINEURAL AS NEEDED
Status: DISCONTINUED | OUTPATIENT
Start: 2024-11-06 | End: 2024-11-06

## 2024-11-06 RX ORDER — ESOMEPRAZOLE MAGNESIUM 40 MG/1
40 CAPSULE, DELAYED RELEASE ORAL
Qty: 30 CAPSULE | Refills: 2 | Status: SHIPPED | OUTPATIENT
Start: 2024-11-06 | End: 2025-02-04

## 2024-11-06 ASSESSMENT — PAIN SCALES - GENERAL
PAINLEVEL_OUTOF10: 0 - NO PAIN

## 2024-11-06 ASSESSMENT — PAIN - FUNCTIONAL ASSESSMENT
PAIN_FUNCTIONAL_ASSESSMENT: 0-10

## 2024-11-06 ASSESSMENT — COLUMBIA-SUICIDE SEVERITY RATING SCALE - C-SSRS
2. HAVE YOU ACTUALLY HAD ANY THOUGHTS OF KILLING YOURSELF?: NO
1. IN THE PAST MONTH, HAVE YOU WISHED YOU WERE DEAD OR WISHED YOU COULD GO TO SLEEP AND NOT WAKE UP?: NO
6. HAVE YOU EVER DONE ANYTHING, STARTED TO DO ANYTHING, OR PREPARED TO DO ANYTHING TO END YOUR LIFE?: NO

## 2024-11-06 NOTE — ANESTHESIA PREPROCEDURE EVALUATION
"Patient: Marissa Mosquera \"Harman or Ms Mosquera\"    Procedure Information       Anesthesia Start Date/Time: 11/06/24 1415    Scheduled providers: Pavel Garcia MD; Abelino Carmona MD    Procedure: EGD    Location: [facility]            Relevant Problems   Cardiac   (+) Benign essential hypertension   (+) HLD (hyperlipidemia)   (+) HTN (hypertension)      Pulmonary   (+) Asthma   (+) CABRERA (dyspnea on exertion)      Neuro   (+) Carotidynia   (+) Lumbar radiculitis   (+) Peripheral neuropathy      GI   (+) Benign neuroendocrine tumor of duodenum (CMS-HCC)   (+) Esophageal dysphagia   (+) Gastroesophageal reflux disease   (+) Hernia, hiatal   (+) Irritable bowel syndrome with diarrhea      Liver   (+) Benign neuroendocrine tumor of duodenum (CMS-HCC)      Endocrine   (+) Class 2 drug-induced obesity with body mass index (BMI) of 35.0 to 35.9 in adult   (+) Hypothyroidism      Musculoskeletal   (+) Cervical spinal stenosis   (+) Cervical spondylosis without myelopathy   (+) Lumbosacral disc herniation      ID   (+) Infected prosthetic knee joint (CMS-HCC)       Clinical information reviewed:   Tobacco  Allergies  Meds   Med Hx  Surg Hx  OB Status  Fam Hx  Soc   Hx        NPO Detail:  NPO/Void Status  Carbohydrate Drink Given Prior to Surgery? : N  Date of Last Liquid: 11/05/24  Time of Last Liquid: 2200  Date of Last Solid: 11/05/24  Time of Last Solid: 2200  Last Intake Type: Clear fluids  Time of Last Void: 1319         Physical Exam    Airway  Mallampati: II  TM distance: >3 FB  Neck ROM: full     Cardiovascular - normal exam     Dental - normal exam     Pulmonary - normal exam     Abdominal - normal exam             Anesthesia Plan    History of general anesthesia?: no  History of complications of general anesthesia?: yes    ASA 3     MAC     intravenous induction   Anesthetic plan and risks discussed with patient.    Plan discussed with CAA and attending.      "

## 2024-11-06 NOTE — ANESTHESIA POSTPROCEDURE EVALUATION
"Patient: Marissa Mosquera \"Harman or Ms Mosquera\"    Procedure Summary       Date: 11/06/24 Room / Location: [facility]    Anesthesia Start: 1415 Anesthesia Stop: 1456    Procedure: EGD Diagnosis: Duodenal nodule    Scheduled Providers: Pavel Garcia MD; Abelino Carmona MD Responsible Provider: Abelino Carmona MD    Anesthesia Type: MAC ASA Status: 3            Anesthesia Type: MAC    Vitals Value Taken Time   /61 11/06/24 1523   Temp 36.6 °C (97.9 °F) 11/06/24 1523   Pulse 57 11/06/24 1523   Resp 17 11/06/24 1523   SpO2 99 % 11/06/24 1523       Anesthesia Post Evaluation    Patient location during evaluation: PACU  Patient participation: complete - patient participated  Level of consciousness: awake  Pain management: adequate  Airway patency: patent  Cardiovascular status: acceptable  Respiratory status: acceptable  Hydration status: acceptable  Postoperative Nausea and Vomiting: none        No notable events documented.    "

## 2024-11-06 NOTE — ANESTHESIA POSTPROCEDURE EVALUATION
"Patient: Marissa Mosquera \"Harman or Ms Mosquera\"    Procedure Summary       Date: 11/06/24 Room / Location: [facility]    Anesthesia Start: 1415 Anesthesia Stop: 1456    Procedure: EGD Diagnosis: Duodenal nodule    Scheduled Providers: Pavel Garcia MD; Abelino Carmona MD Responsible Provider: Abelino Carmona MD    Anesthesia Type: MAC ASA Status: Not recorded            Anesthesia Type: MAC    Vitals Value Taken Time   /61 11/06/24 1523   Temp 36.6 °C (97.9 °F) 11/06/24 1523   Pulse 57 11/06/24 1523   Resp 17 11/06/24 1523   SpO2 99 % 11/06/24 1523       Anesthesia Post Evaluation    Patient location during evaluation: PACU  Patient participation: complete - patient participated  Level of consciousness: awake  Pain management: adequate  Airway patency: patent  Cardiovascular status: acceptable  Respiratory status: acceptable  Hydration status: acceptable  Postoperative Nausea and Vomiting: none        No notable events documented.    "

## 2024-11-07 DIAGNOSIS — K91.2 HYPOGLYCEMIA AFTER GI (GASTROINTESTINAL) SURGERY: Primary | ICD-10-CM

## 2024-11-07 ASSESSMENT — PAIN SCALES - GENERAL: PAINLEVEL_OUTOF10: 0 - NO PAIN

## 2024-11-08 ENCOUNTER — TELEPHONE (OUTPATIENT)
Dept: PRIMARY CARE | Facility: CLINIC | Age: 69
End: 2024-11-08
Payer: MEDICARE

## 2024-11-08 DIAGNOSIS — D3A.8 NEUROENDOCRINE TUMOR: ICD-10-CM

## 2024-11-08 DIAGNOSIS — E16.2 HYPOGLYCEMIA: Primary | ICD-10-CM

## 2024-11-08 RX ORDER — BLOOD-GLUCOSE TRANSMITTER
EACH MISCELLANEOUS
Qty: 1 EACH | Refills: 0 | Status: SHIPPED | OUTPATIENT
Start: 2024-11-08

## 2024-11-08 RX ORDER — BLOOD-GLUCOSE SENSOR
EACH MISCELLANEOUS
Qty: 3 EACH | Refills: 11 | Status: SHIPPED | OUTPATIENT
Start: 2024-11-08

## 2024-11-08 NOTE — TELEPHONE ENCOUNTER
Pt left  regarding her having a tumor removal on 11/6 and following up Endo highly suggest she use glucose monitor. Machine on back order. She does not know what to do moving forward.     advise

## 2024-11-12 NOTE — DOCUMENTATION CLARIFICATION NOTE
"    PATIENT:               PERICO BIRD  ACCT #:                  [id number]  MRN:                       [id number]  :                       1955  ADMIT DATE:       10/29/2024 8:15 AM  DISCH DATE:        10/31/2024 8:25 PM  RESPONDING PROVIDER #:        [id number]          PROVIDER RESPONSE TEXT:    Unable to determine-pt to have continued work up as outpatient    CDI QUERY TEXT:    Clarification        Instruction:    Based on your assessment of the patient and the clinical information, please provide the requested documentation by clicking on the appropriate radio button and enter any additional information if prompted.    Question: Please further clarify the etiology of patient's hypoglycemia after work up    When answering this query, please exercise your independent professional judgment. The fact that a question is being asked, does not imply that any particular answer is desired or expected.    The patient's clinical indicators include:  Clinical Information: 69 yr old woman with hypoglycemia and abdominal pain. Pt's PMH includes bariatric surgery and neuroendocrine tumor.    Clinical Indicators:  10/31 Significant Event: Dr. Trent  \"...suspect her erratic glycemic variability is due to her gastric surgery status rather than the tumor...suggested a trial of acarbose to blunt carbohydrate absorption, and GI side effects...she does not want it.\"    10/31 DC Summary: Dr. Santacruz  \"...Attempts were made to induce hypoglycemia (glucose<55 on CGM) in order to rule in/out an insulinoma: glucose, insulin. c-peptide, proinsulin, beta-hydroxybutyrate. This was done with eating normal meals, glucose tolerance test (100g drink), and 24 hour fast...plans to follow up with Dr. Trent and her other specialists outpatient\"    Treatment:  -D50-12.5gm: 10/29  -Endocrine consult  -Lab testing including glucose tolerance test: 10/30    Risk Factors: age, neuroendocrine tumor, bariatric surgery  Options provided:  -- " Hypoglycemia is complication from prior bariatric surgery  -- Unable to determine-pt to have continued work up as outpatient  -- No evidence of hypoglycemia during this admission  -- Other - I will add my own diagnosis  -- Refer to Clinical Documentation Reviewer    Query created by: Angeli Lam on 11/6/2024 1:21 PM      Electronically signed by:  BEBA URIBE MD 11/12/2024 7:33 AM

## 2024-11-19 ENCOUNTER — OFFICE VISIT (OUTPATIENT)
Dept: HEMATOLOGY/ONCOLOGY | Facility: CLINIC | Age: 69
End: 2024-11-19
Payer: MEDICARE

## 2024-11-19 DIAGNOSIS — C7A.8 NEUROENDOCRINE CANCER: ICD-10-CM

## 2024-11-19 PROCEDURE — 99215 OFFICE O/P EST HI 40 MIN: CPT | Performed by: INTERNAL MEDICINE

## 2024-11-19 PROCEDURE — 3008F BODY MASS INDEX DOCD: CPT | Performed by: INTERNAL MEDICINE

## 2024-11-19 PROCEDURE — 3077F SYST BP >= 140 MM HG: CPT | Performed by: INTERNAL MEDICINE

## 2024-11-19 PROCEDURE — 1036F TOBACCO NON-USER: CPT | Performed by: INTERNAL MEDICINE

## 2024-11-19 PROCEDURE — 1157F ADVNC CARE PLAN IN RCRD: CPT | Performed by: INTERNAL MEDICINE

## 2024-11-19 PROCEDURE — 1159F MED LIST DOCD IN RCRD: CPT | Performed by: INTERNAL MEDICINE

## 2024-11-19 PROCEDURE — 1111F DSCHRG MED/CURRENT MED MERGE: CPT | Performed by: INTERNAL MEDICINE

## 2024-11-19 PROCEDURE — 3080F DIAST BP >= 90 MM HG: CPT | Performed by: INTERNAL MEDICINE

## 2024-11-19 PROCEDURE — 1125F AMNT PAIN NOTED PAIN PRSNT: CPT | Performed by: INTERNAL MEDICINE

## 2024-11-19 ASSESSMENT — PAIN SCALES - GENERAL: PAINLEVEL_OUTOF10: 4

## 2024-11-19 NOTE — PROGRESS NOTES
".Patient ID: Marissa Mosquera \"Harman or Ms Mosquera\" is a 69 y.o. female.  Referring Physician: Pavel Garcia MD  [id number]  Department of Medicine-Gastroenterology  [place] [id number]  Primary Care Provider: Brittany Behm, DO      Chief complaint : Duodenal NETs    HPI  68 yo woman from [place] referred for duod net.  Has a complex history summarized well below:     Complex history including a vertical banded gastroplasty in 2000 as well as a hiatal hernia repair and Slava gastroplasty performed.  This was complicated by gastric dysmotility and functional dysphagia requiring PEG tube placement from 8973-2140.    She underwent surgery in November 2017 with lysis of adhesions and reversal of her vertical band gastroplasty.    Patient's last endoscopy was in March 2023 and was notable for retained food.  She also has had a gastric emptying scan consistent with gastroparesis.  She is also been treated for SIBO multiple times with rifaximin.  She also has been diagnosed with dumping syndrome.  She was found to have an elevated chromogranin a level followed by a PET-DOTATATE scan which did reveal uptake in the first portion of the duodenal wall consistent with a possible neuroendocrine tumor and no evidence of metastatic disease.  10/2024: EUS: 10 mm x 6 mm nodule, originating in the submucosa was visualized in the duodenal bulb.  Path: Well diff. G2 NET of the duodenum.  Of note: She said she had for many years feeling palpitations, diaphoretic andsweating after she eats due to low BG.  Labs showed initially high insulin but repeat showed normal insulin, proinsulin and C peptide  11/06/2024: Pt had EMR of the duodenal polyp for complete resection    Today: She is feeling tired, lethargy, generalized muscle pain and weakness.             SYSTEMIC TREATMENT RECEIVED       Subjective   Please refer to Notes above for complete History of present illness.          Review of Systems:   All 14 systems have " been reviewed and were negative except for the HPI.       MEDICAL HISTORY  Past Medical History:   Diagnosis Date    Anesthesia of skin 04/20/2015    Finger numbness    Arthritis 2008    Cataract 2018    Chronic kidney disease 2023    Conjunctival cysts, left eye 08/14/2018    Conjunctival cyst of left eye    Disease of thyroid gland 1985    GERD (gastroesophageal reflux disease) 1976    Hypertension 2015    Hypertensive retinopathy, bilateral 08/20/2019    Hypertensive retinopathy of both eyes    Other conditions influencing health status 02/15/2017    Compression fracture    Peptic ulceration 2023    Personal history of diseases of the skin and subcutaneous tissue 11/01/2018    History of urticaria    Personal history of other diseases of the circulatory system 11/01/2018    History of hypertension    Personal history of other specified conditions 10/31/2018    History of diarrhea    Personal history of other specified conditions 07/27/2015    History of vertigo    PONV (postoperative nausea and vomiting)     Retinal hemorrhage, left eye 02/13/2018    Retinal hemorrhage of left eye    Varicella 1957    Visual impairment 1963       FAMILY HISTORY  Family History   Problem Relation Name Age of Onset    Heart failure Mother Elisabet     Hypertension Mother Elisabet     Arthritis Mother Elisabet     Depression Mother Elisabet     Early natural death Mother Elisabet     Stroke Mother Elisabet     Heart failure Father Vernon     Heart disease Father Vernon     Other (Sinus problem) Brother      Cancer Other Grandfather     Cancer Maternal Grandfather [facility]     Early natural death Maternal Grandfather [facility]     Stroke Maternal Grandfather [facility]     Alcohol abuse Brother Mario     Diabetes Brother Mario     Hypertension Brother Mario     Alcohol abuse Brother Junior     Drug abuse Brother Junior     Stroke Mother's Sister Katharine        TOBACCO HISTORY  Tobacco Use: Low Risk  (11/6/2024)    Patient History     Smoking Tobacco Use: Never      Smokeless Tobacco Use: Never     Passive Exposure: Not on file       SOCIAL HISTORY  Social Connections: Not on file        Outpatient Medication Profile:  Current Outpatient Medications on File Prior to Visit   Medication Sig Dispense Refill    amLODIPine (Norvasc) 10 mg tablet Take 1 tablet (10 mg) by mouth once daily. 90 tablet 3    atorvastatin (Lipitor) 10 mg tablet TAKE 1 TABLET BY MOUTH EVERY DAY 90 tablet 3    calcium carbonate (Tums) 200 mg calcium chewable tablet Chew 1 tablet (500 mg) 4 times a day as needed for indigestion or heartburn.      Dexcom G6 Sensor device Change every 10 days 3 each 11    Dexcom G6 Transmitter device Use as instructed 1 each 0    esomeprazole (NexIUM) 40 mg DR capsule Take 1 capsule (40 mg) by mouth once daily in the morning. Take before meals. Do not open capsule. 30 capsule 2    FreeStyle Sophy 3 Sensor device Change sensor every 2 weeks 1 each 5    levothyroxine (Synthroid, Levoxyl) 75 mcg tablet TAKE 1 TABLET BY MOUTH EVERY DAY 90 tablet 3    omeprazole OTC (PriLOSEC OTC) 20 mg EC tablet Take 1 tablet (20 mg) by mouth once daily in the morning. Take before meals. Do not crush, chew, or split.      ondansetron (Zofran) 4 mg tablet Take 2 tablets (8 mg) by mouth 2 times a day as needed for nausea or vomiting. 20 tablet 3     Current Facility-Administered Medications on File Prior to Visit   Medication Dose Route Frequency Provider Last Rate Last Admin    perflutren lipid microspheres (Definity) injection 0.5-10 mL of dilution  0.5-10 mL of dilution intravenous Once in imaging Jose BRIGGS MD             Performance Status:  Symptomatic; fully ambulatory     Vitals and Measurements:   There were no vitals taken for this visit.      Physical Exam:   General: Appears well and in NAD  Eyes: PERRL, EOMI, clear sclera  ENMT: mucous membranes moist, no apparent injury, no lesions seen  Head/Neck: Neck supple, no apparent injury, thyroid without mass or tenderness, No JVD,  trachea midline,   Thorax: Patent airways, CTAB, normal breath sounds with good chest expansion, thorax symmetric  Cardiovascular: Regular, rate and rhythm, no murmurs, 2+ equal pulses of the extremities, normal S 1and S 2  Gastrointestinal: Nondistended, soft, non-tender, no rebound tenderness or guarding, no masses palpable, no organomegaly, +BS  Musculoskeletal: ROM intact, no joint swelling, normal strength  Extremities: normal extremities, no cyanosis edema, contusions or wounds, no clubbing  Neurological: alert and oriented x3, intact senses, motor, response and reflexes, normal strength  Lymphatic: No significant lymphadenopathy  Psychological: Appropriate mood and behavior  Skin: Warm and dry, no lesions, no rashes         Lab Results:  I have reviewed these laboratory results:     Lab Results   Component Value Date    WBC 5.9 10/31/2024    HGB 13.5 10/31/2024    HCT 40.9 10/31/2024    MCV 88 10/31/2024     10/31/2024         Chemistry    Lab Results   Component Value Date/Time     10/31/2024 0429    K 3.8 10/31/2024 0429     10/31/2024 0429    CO2 28 10/31/2024 0429    BUN 20 10/31/2024 0429    CREATININE 1.24 (H) 10/31/2024 0429    Lab Results   Component Value Date/Time    CALCIUM 8.9 10/31/2024 0429    ALKPHOS 79 10/31/2024 0429    AST 12 10/31/2024 0429    ALT 11 10/31/2024 0429    BILITOT 0.6 10/31/2024 0429            Lab Results   Component Value Date    TSH 1.95 10/29/2024        Radiology Result:  I have reviewed the latest Imaging in PACS and the findings are noted in this note. I discussed the results of the latest imaging with the patient. All previous imaging were reviewed at the time it was completed. Full records are available in the EMR for review as well.     === 10/29/24 ===    CT ABDOMEN PELVIS W IV CONTRAST    - Impression -  Postsurgical changes of the stomach. No evidence of bowel obstruction  or free fluid. Mild stranding in the mesentery similar to  09/04/2024.    Small hiatal and ventral hernias.    Hypodensities arising from both kidneys, probable cysts although not  fully characterized on single phase exam.    Additional findings as described above.    MACRO:  None.    Signed by: Mone Platt 10/29/2024 3:31 PM  Dictation workstation:   [id number]    === 04/13/24 ===    MR ANKLE LEFT WO CONTRAST    - Impression -  1. Mild sprain of the deep deltoid ligament complex which may be  acute on chronic with acute component with reactive marrow change at  the tibial and talar attachments. Subtle associated avulsion  fracturing particularly from the tibial attachment is not entirely  excluded.  2. Patchy reactive marrow change and multiple bones in the midfoot  favored to be secondary to degenerative change at multiple joints in  the midfoot, severe at the 2nd tarsometatarsal articulation, although  a component of contusion versus stress reaction is not entirely  excluded with the bone of possible greatest concern for  contusion/stress reaction being the navicular bone. No  macrotrabecular fracture line is evident.  3. Findings most compatible with a degree of chronic sprains of the  anterior inferior tibiofibular ligament, anterior talofibular  ligament, and calcaneofibular ligament with sequelae of prior  avulsion injuries at the anterior inferior tibiofibular and anterior  talofibular ligaments.  4. Mild tibialis posterior tendon sheath tenosynovitis.  5. Edema in the subcutaneous tissues which may be related to  lymphedema versus contusion. This is greatest over the medial  malleolus.  6. Other findings discussed above.    Signed by: Jesus Hernandez 4/13/2024 11:27 AM  Dictation workstation:   [id number]           Pathology Results:  I have reviewed the full pathology report recorded in the EMR. The pertinent portions indicating diagnosis are listed here in the note. for details please refer to the full report recorded in the EMR.    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  Has a complex history summarized well below:     Complex history including a vertical banded gastroplasty in 2000 as well as a hiatal hernia repair and Slava gastroplasty performed.  This was complicated by gastric dysmotility and functional dysphagia requiring PEG tube placement from 8469-3763.    She underwent surgery in November 2017 with lysis of adhesions and reversal of her vertical band gastroplasty.    Patient's last endoscopy was in March 2023 and was notable for retained food.  She also has had a gastric emptying scan consistent with gastroparesis.  She is also been treated for SIBO multiple times with rifaximin.  She also has been diagnosed with dumping syndrome.  She was found to have an elevated chromogranin a level followed by a PET-DOTATATE scan which did reveal uptake in the first portion of the duodenal wall consistent with a possible neuroendocrine tumor and no evidence of metastatic disease.  10/2024: EUS: 10 mm x 6 mm nodule, originating in the submucosa was visualized in the duodenal bulb.  Path: Well diff. G2 NET of the duodenum.  Of note: She said she had for many years feeling palpitations, diaphoretic andsweating after she eats due to low BG.  Labs showed initially high insulin but repeat showed normal insulin, proinsulin and C peptide  11/06/2024: Pt had EMR of the duodenal polyp for complete resection    Today: She is feeling tired, lethargy, generalized muscle pain and weakness.    Plan:  - Will order serum glucagon given high BG in her lexie free style device she showed to us today  - Periodic endoscopic surveillance in 6-12 months  - PET-Ga68 in 1 year for follow up     DISCLAIMER:   In preparing for this visit and writing this note, I reviewed all the previous electronic medical records (labs, imaging and medical charts) of the patient available in the physician portal. Significant findings  which helped in decision making are recorded  in this chart.     The plan was discussed with the patient. We gave him ample opportunities to ask questions. All questions were answered to his satisfaction and he verbalized understanding.       Catalina Sofia M.D.   and Director of the Neuroendocrine Tumor Program  Gastrointestinal Medical Oncology  Department of Hematology and Oncology  [facility], [place] [address]  Phone (Office): [phone]  Fax:  [phone]   Email: [email or web address]  Learn more about [facility] Comprehensive Neuroendocrine Tumor Program: Click Here  Learn more about [place] [employer]: [email or web address]

## 2024-11-20 ENCOUNTER — LAB (OUTPATIENT)
Dept: LAB | Facility: HOSPITAL | Age: 69
End: 2024-11-20
Payer: MEDICARE

## 2024-11-20 ENCOUNTER — APPOINTMENT (OUTPATIENT)
Dept: ENDOCRINOLOGY | Facility: CLINIC | Age: 69
End: 2024-11-20
Payer: MEDICARE

## 2024-11-20 VITALS
SYSTOLIC BLOOD PRESSURE: 150 MMHG | DIASTOLIC BLOOD PRESSURE: 83 MMHG | HEIGHT: 61 IN | BODY MASS INDEX: 36.63 KG/M2 | HEART RATE: 69 BPM | WEIGHT: 194 LBS

## 2024-11-20 VITALS
BODY MASS INDEX: 36.8 KG/M2 | TEMPERATURE: 97.5 F | RESPIRATION RATE: 18 BRPM | SYSTOLIC BLOOD PRESSURE: 153 MMHG | OXYGEN SATURATION: 97 % | DIASTOLIC BLOOD PRESSURE: 91 MMHG | HEART RATE: 72 BPM | HEIGHT: 61 IN | WEIGHT: 194.9 LBS

## 2024-11-20 DIAGNOSIS — C7A.8 NEUROENDOCRINE CANCER: ICD-10-CM

## 2024-11-20 DIAGNOSIS — E78.2 MIXED HYPERLIPIDEMIA: ICD-10-CM

## 2024-11-20 DIAGNOSIS — E53.8 LOW SERUM VITAMIN B12: ICD-10-CM

## 2024-11-20 DIAGNOSIS — R73.03 PREDIABETES: ICD-10-CM

## 2024-11-20 DIAGNOSIS — R68.89 ABNORMAL ENDOCRINE LABORATORY TEST FINDING: ICD-10-CM

## 2024-11-20 DIAGNOSIS — E16.2 HYPOGLYCEMIA: ICD-10-CM

## 2024-11-20 DIAGNOSIS — E16.2 HYPOGLYCEMIA: Primary | ICD-10-CM

## 2024-11-20 LAB
ALBUMIN SERPL BCP-MCNC: 4.7 G/DL (ref 3.4–5)
ALP SERPL-CCNC: 99 U/L (ref 33–136)
ALT SERPL W P-5'-P-CCNC: 13 U/L (ref 7–45)
ANION GAP SERPL CALC-SCNC: 15 MMOL/L (ref 10–20)
AST SERPL W P-5'-P-CCNC: 14 U/L (ref 9–39)
BASOPHILS # BLD AUTO: 0.06 X10*3/UL (ref 0–0.1)
BASOPHILS NFR BLD AUTO: 0.9 %
BILIRUB SERPL-MCNC: 0.6 MG/DL (ref 0–1.2)
BUN SERPL-MCNC: 18 MG/DL (ref 6–23)
C PEPTIDE SERPL-MCNC: 2.8 NG/ML (ref 0.7–3.9)
CALCIUM SERPL-MCNC: 9.7 MG/DL (ref 8.6–10.3)
CHLORIDE SERPL-SCNC: 104 MMOL/L (ref 98–107)
CHOLEST SERPL-MCNC: 187 MG/DL (ref 0–199)
CHOLESTEROL/HDL RATIO: 2.2
CO2 SERPL-SCNC: 28 MMOL/L (ref 21–32)
CREAT SERPL-MCNC: 1.2 MG/DL (ref 0.5–1.05)
EGFRCR SERPLBLD CKD-EPI 2021: 49 ML/MIN/1.73M*2
EOSINOPHIL # BLD AUTO: 0.16 X10*3/UL (ref 0–0.7)
EOSINOPHIL NFR BLD AUTO: 2.5 %
ERYTHROCYTE [DISTWIDTH] IN BLOOD BY AUTOMATED COUNT: 13.6 % (ref 11.5–14.5)
EST. AVERAGE GLUCOSE BLD GHB EST-MCNC: 114 MG/DL
GLUCOSE SERPL-MCNC: 101 MG/DL (ref 74–99)
HBA1C MFR BLD: 5.6 %
HCT VFR BLD AUTO: 44.2 % (ref 36–46)
HDLC SERPL-MCNC: 86.8 MG/DL
HGB BLD-MCNC: 14.8 G/DL (ref 12–16)
IMM GRANULOCYTES # BLD AUTO: 0.03 X10*3/UL (ref 0–0.7)
IMM GRANULOCYTES NFR BLD AUTO: 0.5 % (ref 0–0.9)
INSULIN P FAST SERPL-ACNC: 9 UIU/ML (ref 3–25)
LDLC SERPL CALC-MCNC: 84 MG/DL
LYMPHOCYTES # BLD AUTO: 1.47 X10*3/UL (ref 1.2–4.8)
LYMPHOCYTES NFR BLD AUTO: 22.6 %
MCH RBC QN AUTO: 29.1 PG (ref 26–34)
MCHC RBC AUTO-ENTMCNC: 33.5 G/DL (ref 32–36)
MCV RBC AUTO: 87 FL (ref 80–100)
MONOCYTES # BLD AUTO: 0.46 X10*3/UL (ref 0.1–1)
MONOCYTES NFR BLD AUTO: 7.1 %
NEUTROPHILS # BLD AUTO: 4.32 X10*3/UL (ref 1.2–7.7)
NEUTROPHILS NFR BLD AUTO: 66.4 %
NON HDL CHOLESTEROL: 100 MG/DL (ref 0–149)
NRBC BLD-RTO: 0 /100 WBCS (ref 0–0)
PLATELET # BLD AUTO: 301 X10*3/UL (ref 150–450)
POTASSIUM SERPL-SCNC: 4.3 MMOL/L (ref 3.5–5.3)
PROT SERPL-MCNC: 7.4 G/DL (ref 6.4–8.2)
RBC # BLD AUTO: 5.09 X10*6/UL (ref 4–5.2)
SODIUM SERPL-SCNC: 143 MMOL/L (ref 136–145)
TRIGL SERPL-MCNC: 79 MG/DL (ref 0–149)
TSH SERPL-ACNC: 2.45 MIU/L (ref 0.44–3.98)
VIT B12 SERPL-MCNC: 323 PG/ML (ref 211–911)
VLDL: 16 MG/DL (ref 0–40)
WBC # BLD AUTO: 6.5 X10*3/UL (ref 4.4–11.3)

## 2024-11-20 PROCEDURE — 82607 VITAMIN B-12: CPT

## 2024-11-20 PROCEDURE — 80061 LIPID PANEL: CPT

## 2024-11-20 PROCEDURE — 83520 IMMUNOASSAY QUANT NOS NONAB: CPT

## 2024-11-20 PROCEDURE — 99205 OFFICE O/P NEW HI 60 MIN: CPT | Performed by: INTERNAL MEDICINE

## 2024-11-20 PROCEDURE — 1111F DSCHRG MED/CURRENT MED MERGE: CPT | Performed by: INTERNAL MEDICINE

## 2024-11-20 PROCEDURE — 84681 ASSAY OF C-PEPTIDE: CPT

## 2024-11-20 PROCEDURE — 3077F SYST BP >= 140 MM HG: CPT | Performed by: INTERNAL MEDICINE

## 2024-11-20 PROCEDURE — 1159F MED LIST DOCD IN RCRD: CPT | Performed by: INTERNAL MEDICINE

## 2024-11-20 PROCEDURE — 84586 ASSAY OF VIP: CPT

## 2024-11-20 PROCEDURE — 80053 COMPREHEN METABOLIC PANEL: CPT

## 2024-11-20 PROCEDURE — 3079F DIAST BP 80-89 MM HG: CPT | Performed by: INTERNAL MEDICINE

## 2024-11-20 PROCEDURE — 1125F AMNT PAIN NOTED PAIN PRSNT: CPT | Performed by: INTERNAL MEDICINE

## 2024-11-20 PROCEDURE — 3008F BODY MASS INDEX DOCD: CPT | Performed by: INTERNAL MEDICINE

## 2024-11-20 PROCEDURE — 84443 ASSAY THYROID STIM HORMONE: CPT

## 2024-11-20 PROCEDURE — 82943 ASSAY OF GLUCAGON: CPT

## 2024-11-20 PROCEDURE — 83036 HEMOGLOBIN GLYCOSYLATED A1C: CPT

## 2024-11-20 PROCEDURE — 36415 COLL VENOUS BLD VENIPUNCTURE: CPT

## 2024-11-20 PROCEDURE — 85025 COMPLETE CBC W/AUTO DIFF WBC: CPT

## 2024-11-20 PROCEDURE — 83525 ASSAY OF INSULIN: CPT

## 2024-11-20 PROCEDURE — 82941 ASSAY OF GASTRIN: CPT

## 2024-11-20 PROCEDURE — 1157F ADVNC CARE PLAN IN RCRD: CPT | Performed by: INTERNAL MEDICINE

## 2024-11-20 PROCEDURE — 86316 IMMUNOASSAY TUMOR OTHER: CPT

## 2024-11-20 ASSESSMENT — PAIN SCALES - GENERAL: PAINLEVEL_OUTOF10: 4

## 2024-11-20 NOTE — PROGRESS NOTES
Chief complaint: hypoglycemia    HPI:    The patient is a 69 year old female with a past medical history significant of but not limited to primary hyperparathyroidism s/p parathyroidectomy 3 gland removal seen by Dr. Kay in the past, hypothyroidism, HTN, HLD, hypoglycemia, NET of the duodenum resected in 11/2024 ( pathology: well differentiated producing gastrin and serotonin with focal pancreatic polypeptide, grade 2), hx of bariatric and reversal in 2017 presents to the office today to establish care with endocrinology for hypoglycemia.     She was evaluated for the same by Dr. Trent at [facility] where she was admitted for hypoglycemia in 10/2024. She states she first started noticed diaphoretic, tachycardiac sx which were relieved with a piece of sugar about 3 sugars. She also had an severe SIBO which was finally treated in 2024 which finally helped differentiate her symptoms. She kept craving for craving for sugar as only that was the relieving factor. She also noticed that sx such as diaphoresis, irritability, tachycardia, mental fogginess, nausea, orthostatic changes etc mostly occurred 40 minutes after she would eat ( be it carb or protein). Alleviating factors were sugar and cold water. She had extensive testing done by her neurologist to r/o neurological disorders but nothing was found. She finally reached out to her PCP to get a CGM which she was able to get only in 10/2024.    Currently, she has flushing, spontaneous muscle pain but denies appetite, diarrhea, muscle cramps. Her Sophy just started working since 11/18 so we do not have a whole lot of data from Sophy after her surgery. But it does go as:      11/18  11:59 PM: 69  10:07 PM: 69  06:26 PM: 107    11/19  07:54 AM: 67  07:58 AM: 54  09:47 AM: 65  10:56 AM:67    The patient states she is not on any glipizide, metformin or herbal medications. The symptoms are more frequent and happen every time she eats. She had a 3 Hr OGTT test during her  hospital admission but no hypoglycemic reaction was found. She also had a prolonged fast to r/o insulinoma, and she did not have any spike or hypoglycemic reactions at 21 hours fasting.    ROS;  Negative unless noted above    Patient Active Problem List   Diagnosis    Abnormal brain MRI    Abnormal finding in urine    Abnormal intestinal absorption (HHS-HCC)    Abnormality of gait    Achilles tendinitis of right lower extremity    Adjustment disorder with depressed mood    Arthritis of first MTP joint    Allopurinol adverse reaction    Asthma    Benign essential hypertension    Benign positional vertigo    Dry eyes, bilateral    Carotidynia    Cervical spinal stenosis    Cervical spondylosis without myelopathy    Choroidal nevus of left eye    Chronic pain    Chronic pain of both shoulders    Stage 3b chronic kidney disease (Multi)    Combined form of age-related cataract, right eye    Compression fracture of L1 lumbar vertebra (Multi)    Dizziness    Dyslipidemia    Essential tremor    Fatigue    Fracture of shaft of ulna    Functional bowel disease    Gastroparesis    Gout    H/O diplopia    History of bariatric surgery    HLD (hyperlipidemia)    HTN (hypertension)    Hypercalcemia    Hypertrophy of breast    Hypoglycemia    Hypothyroidism    Infected prosthetic knee joint (CMS-HCC)    Insomnia with sleep apnea    Intracranial arachnoid cyst    Irritable bowel syndrome with diarrhea    Lumbar radiculitis    Lumbosacral disc herniation    Macular drusen    Mild renal insufficiency    Myalgia    Myopia of left eye    Nevus of iris of right eye    Arthritis    Esophageal dysphagia    Gastroesophageal reflux disease    Hernia, hiatal    Gastritis    Nonsurgical dumping syndrome    Radial styloid tenosynovitis    Shoulder arthritis    Tremor of right hand    Orthostatic lightheadedness    Osteopenia    Osteoporosis    Other bursal cyst, unspecified hand    Overweight    Palpitations    Periorbital edema of right eye     Peripheral neuropathy    Pre-diabetes    Retinal hemorrhage, left eye    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    CABRERA (dyspnea on exertion)    Shortness of breath at rest    Malabsorption (HHS-HCC)    History of replacement of both shoulder joints    Syncope    Vision changes    Visual disturbance    Vitamin B12 deficiency    Vitamin D insufficiency    Weight loss    Second degree burn of right hand    Burn, first degree    Class 2 drug-induced obesity with body mass index (BMI) of 35.0 to 35.9 in adult    Early dry stage nonexudative age-related macular degeneration of both eyes    Combined form of age-related cataract, left eye    Hypertensive retinopathy of both eyes    Presbyopia    Benign neuroendocrine tumor of duodenum (CMS-HCC)     Family History   Problem Relation Name Age of Onset    Heart failure Mother Elisabet     Hypertension Mother Elisabet     Arthritis Mother Elisabet     Depression Mother Elisabet     Early natural death Mother Elisabet     Stroke Mother Elisabet     Heart failure Father Vernon     Heart disease Father Vernon     Other (Sinus problem) Brother      Cancer Other Grandfather     Cancer Maternal Grandfather [facility]     Early natural death Maternal Grandfather [facility]     Stroke Maternal Grandfather [facility]     Alcohol abuse Brother Mario     Diabetes Brother Mario     Hypertension Brother Mario     Alcohol abuse Brother Junior     Drug abuse Brother Junior     Stroke Mother's Sister Katharine      Past Surgical History:   Procedure Laterality Date    BREAST BIOPSY      rt benign core    BREAST SURGERY  2007    COLONOSCOPY  06/08/2016    Complete Colonoscopy    CT ANGIO NECK  06/28/2018    CT NECK ANGIO W AND WO IV CONTRAST 6/28/2018 [facility]    CT ANGIO NECK  08/29/2022    CT NECK ANGIO W AND WO IV CONTRAST 8/29/2022 [facility]GENCY [facility]GAC[facility]    CT HEAD ANGIO W AND WO IV CONTRAST  06/28/2018    CT HEAD ANGIO W AND WO IV CONTRAST 6/28/2018 [facility]    CT HEAD ANGIO W AND WO IV CONTRAST   08/29/2022    CT HEAD ANGIO W AND WO IV CONTRAST 8/29/2022 [facility] EMERGENCY LEGAC[facility]    FRACTURE SURGERY  1980    GASTRIC FUNDOPLICATION  06/29/2017    Esophagogastric Fundoplasty Nissen Fundoplication    JOINT REPLACEMENT  2011, 2021,2022    MR HEAD ANGIO WO IV CONTRAST  09/29/2021    MR HEAD ANGIO WO IV CONTRAST 9/29/2021 [facility]CILLARY LEGAC[facility]    OTHER SURGICAL HISTORY  07/17/2017    Esophagogastric Fundoplasty Laparoscopic For Paraesophageal Hernia Repair    OTHER SURGICAL HISTORY  07/17/2017    Gastroplasty Longitudinal    OTHER SURGICAL HISTORY  07/17/2017    Esophageal Lengthening    OTHER SURGICAL HISTORY  07/17/2017    Repair Of Paraesophageal Hiatus Hernia    OTHER SURGICAL HISTORY  12/18/2017    Laparoscopy Repair Of Hernia    OTHER SURGICAL HISTORY  03/05/2019    Shoulder replacement    OTHER SURGICAL HISTORY  04/22/2015    Treatment Of Wrist Fracture    OTHER SURGICAL HISTORY  04/22/2015    Open Treatment Of Clavicular Fracture    OTHER SURGICAL HISTORY  04/22/2015    Breast Surgery Excision Of Breast Single Lesion    OTHER SURGICAL HISTORY  04/22/2015    Parathyroid Complete Parathyroidectomy    SMALL INTESTINE SURGERY  11/2016    SPINE SURGERY  2019    TOTAL KNEE ARTHROPLASTY  04/22/2015    Knee Replacement    TOTAL SHOULDER ARTHROPLASTY Bilateral      No Known Allergies  Social History     Socioeconomic History    Marital status:      Spouse name: Not on file    Number of children: Not on file    Years of education: Not on file    Highest education level: Not on file   Occupational History    Not on file   Tobacco Use    Smoking status: Never    Smokeless tobacco: Never   Vaping Use    Vaping status: Never Used   Substance and Sexual Activity    Alcohol use: Not Currently     Comment: Social but rarely    Drug use: Never    Sexual activity: Not Currently     Partners: Male   Other Topics Concern    Not on file   Social History Narrative    Not on file     Social Drivers of Health     Financial  "Resource Strain: Low Risk  (10/29/2024)    Overall Financial Resource Strain (CARDIA)     Difficulty of Paying Living Expenses: Not hard at all   Food Insecurity: No Food Insecurity (12/5/2023)    Hunger Vital Sign     Worried About Running Out of Food in the Last Year: Never true     Ran Out of Food in the Last Year: Never true   Transportation Needs: No Transportation Needs (10/29/2024)    PRAPARE - Transportation     Lack of Transportation (Medical): No     Lack of Transportation (Non-Medical): No   Physical Activity: Not on file   Stress: Not on file   Social Connections: Not on file   Intimate Partner Violence: Not on file   Housing Stability: Low Risk  (10/29/2024)    Housing Stability Vital Sign     Unable to Pay for Housing in the Last Year: No     Number of Times Moved in the Last Year: 1     Homeless in the Last Year: No     Vitals:  Vitals:    11/20/24 0747   BP: 150/83   BP Location: Right arm   Patient Position: Sitting   BP Cuff Size: Large adult   Pulse: 69   Weight: 88 kg (194 lb)   Height: 1.537 m (5' 0.5\")     Physical Exam:    General: elderly female patient in NAD  HEENT: AT/NC  Neck: trachea in midline, small firm rubbery thyroid  Resp: CTA B/L  CVS: normal s1 and s2  Abdomen: soft and non tender to palpation, BS+  Skin: warm, dry and intact  Neuro: AAO x3, DTR 2+, - chvostek's  Psych: cooperative    Medications:    Current Outpatient Medications on File Prior to Visit   Medication Sig Dispense Refill    amLODIPine (Norvasc) 10 mg tablet Take 1 tablet (10 mg) by mouth once daily. 90 tablet 3    atorvastatin (Lipitor) 10 mg tablet TAKE 1 TABLET BY MOUTH EVERY DAY 90 tablet 3    calcium carbonate (Tums) 200 mg calcium chewable tablet Chew 1 tablet (500 mg) 4 times a day as needed for indigestion or heartburn.      esomeprazole (NexIUM) 40 mg DR capsule Take 1 capsule (40 mg) by mouth once daily in the morning. Take before meals. Do not open capsule. 30 capsule 2    FreeStyle Sophy 3 Sensor device " Change sensor every 2 weeks 1 each 5    levothyroxine (Synthroid, Levoxyl) 75 mcg tablet TAKE 1 TABLET BY MOUTH EVERY DAY 90 tablet 3    ondansetron (Zofran) 4 mg tablet Take 2 tablets (8 mg) by mouth 2 times a day as needed for nausea or vomiting. 20 tablet 3    Dexcom G6 Sensor device Change every 10 days 3 each 11    Dexcom G6 Transmitter device Use as instructed 1 each 0    omeprazole OTC (PriLOSEC OTC) 20 mg EC tablet Take 1 tablet (20 mg) by mouth once daily in the morning. Take before meals. Do not crush, chew, or split.       Current Facility-Administered Medications on File Prior to Visit   Medication Dose Route Frequency Provider Last Rate Last Admin    perflutren lipid microspheres (Definity) injection 0.5-10 mL of dilution  0.5-10 mL of dilution intravenous Once in imaging Jose BRIGGS MD         Labs:     Latest Reference Range & Units 12/01/17 06:30 03/09/18 08:33 03/12/18 08:33 06/27/18 16:54 06/28/18 05:14 08/02/18 13:26 09/30/18 21:45 10/11/18 15:22 10/24/18 08:28 12/12/18 21:47 12/14/18 08:50 03/14/19 13:48 06/14/19 13:12 06/02/20 08:22 08/26/20 13:27 02/12/21 07:49 04/05/21 11:08 05/18/21 10:26 07/14/21 10:49 09/09/21 13:04 09/28/21 14:14 10/28/21 13:16 04/26/22 09:22 05/19/22 14:37 08/29/22 18:20 08/30/22 06:16 03/06/23 08:04 03/13/23 09:34 06/26/23 11:49 09/19/23 10:02 09/19/23 10:24 11/29/23 13:42 03/18/24 12:28 08/13/24 16:39 09/03/24 08:37 10/29/24 09:08 10/29/24 22:16 10/30/24 07:58 10/30/24 11:40 10/30/24 12:05 10/30/24 12:06 10/30/24 12:53 10/30/24 12:54 10/30/24 13:03 10/31/24 04:29   Hemoglobin A1C see below %   5.6                    5.9 !        5.7 !  5.7 (H)               Parathyroid Hormone, Intact 18.5 - 88.0 pg/mL             55.5           102.6 (H)      57.9                  Beta-Hydroxybutyrate 0.02 - 0.27 mmol/L                                    0.14 0.13   0.12     0.25   C-Peptide 0.7 - 3.9 ng/mL                                    3.6 7.6 (H) 4.2 (H) 14.2 (H)   8.2 (H)    2.5   Insulin 3 - 25 uIU/mL                                     11  12  56 (H)  20 12 14  6   Thyroid Stimulating Hormone 0.44 - 3.98 mIU/L    1.84   3.09       3.63 1.98 2.27       1.65   2.42 2.30    2.67  2.50   1.95            Vitamin D, 25-Hydroxy, Total ng/mL             21 ! 27 !  16 !  29 ! 31 33   26 ! 30      30                  Insulin, Fasting 3 - 25 uIU/mL                                      10          GLUCOSE 74 - 99 mg/dL 84 93  74 85 92 116 (H) 95 92 88 95 97  98 125 (H) 86 92 90 97  207 (H) 85 91 82 89  89 78 102 (H) 86 83 86 85  89 99 82  175 (H) 109 (H)     89   Glucose, One hour mg/dL                                             89   Estimated Average Glucose Not Established mg/dL   114                    123        117  117               Glucose, Fasting  mg/dL                                      99          CHROMOGRANIN A,LC/MS/MS ADULTS: <311 ng/mL                                  679 (H)              Proinsulin, Intact <=7.2 pmol/L                                     8.5 (H)       6.1 2.1   !: Data is abnormal  (H): Data is abnormally high      Assessment and Plan:  The patient is a 69 year old female who presents to the office today for evaluation and management of hypoglycemia 40 minutes after meals in the setting of bariatric surgery. Discussed with the patient the likely etiology of her symptoms and the lack of in her case of, also discussed the previous endocrine labs in her case. We spoke about how her previous bariatric surgery could be causing a lot of these symptoms in her case but nothing can be said with certainty. Explained to her that we will do some labs before coming with a final diagnosis, but for now we can call it as dumping only since nothing else in endocrinology would explain her symptoms.    - Ordered Gastrin, VIP, Tryptase, Glucagon, C-Peptide, Insulin, CMP, Chromogranin ( she is on PPI which she does not take), A1c.   - Will call her once her labs are resulted to  discuss the next steps of action.  - Encouraged her to eat small bolus meals which will help avoid these bouts of hypoglycemia.    The patient was seen and discussed with Dr. Gant.    Raegan Avilez MD  PGY-4 Endocrinology Fellow

## 2024-11-21 ENCOUNTER — OFFICE VISIT (OUTPATIENT)
Dept: PRIMARY CARE | Facility: CLINIC | Age: 69
End: 2024-11-21
Payer: MEDICARE

## 2024-11-21 VITALS
HEART RATE: 70 BPM | HEIGHT: 61 IN | OXYGEN SATURATION: 97 % | BODY MASS INDEX: 36.63 KG/M2 | WEIGHT: 194 LBS | DIASTOLIC BLOOD PRESSURE: 68 MMHG | SYSTOLIC BLOOD PRESSURE: 124 MMHG | TEMPERATURE: 97.7 F | RESPIRATION RATE: 16 BRPM

## 2024-11-21 DIAGNOSIS — E55.9 VITAMIN D DEFICIENCY: Primary | ICD-10-CM

## 2024-11-21 DIAGNOSIS — M70.62 TROCHANTERIC BURSITIS OF LEFT HIP: ICD-10-CM

## 2024-11-21 DIAGNOSIS — E16.2 HYPOGLYCEMIA: ICD-10-CM

## 2024-11-21 DIAGNOSIS — M79.601 RIGHT ARM PAIN: ICD-10-CM

## 2024-11-21 LAB — GASTRIN SERPL-MCNC: 211 PG/ML (ref 0–100)

## 2024-11-21 PROCEDURE — 3078F DIAST BP <80 MM HG: CPT | Performed by: FAMILY MEDICINE

## 2024-11-21 PROCEDURE — 1111F DSCHRG MED/CURRENT MED MERGE: CPT | Performed by: FAMILY MEDICINE

## 2024-11-21 PROCEDURE — 3074F SYST BP LT 130 MM HG: CPT | Performed by: FAMILY MEDICINE

## 2024-11-21 PROCEDURE — 1036F TOBACCO NON-USER: CPT | Performed by: FAMILY MEDICINE

## 2024-11-21 PROCEDURE — 1157F ADVNC CARE PLAN IN RCRD: CPT | Performed by: FAMILY MEDICINE

## 2024-11-21 PROCEDURE — 99214 OFFICE O/P EST MOD 30 MIN: CPT | Performed by: FAMILY MEDICINE

## 2024-11-21 PROCEDURE — 3008F BODY MASS INDEX DOCD: CPT | Performed by: FAMILY MEDICINE

## 2024-11-21 PROCEDURE — 1159F MED LIST DOCD IN RCRD: CPT | Performed by: FAMILY MEDICINE

## 2024-11-21 RX ORDER — METHYLPREDNISOLONE 4 MG/1
TABLET ORAL
Qty: 21 TABLET | Refills: 0 | Status: SHIPPED | OUTPATIENT
Start: 2024-11-21 | End: 2024-11-28

## 2024-11-21 NOTE — PROGRESS NOTES
"Subjective   Marissa RIVERA Eveline \"Harman or Ms Mosquera\" is a 69 y.o. female who presents for Immobility (Sore spot on upper femur when touch or bearing weight on right leg/).  HPI      L left pain can't walk or go up or down stairs. Outer hip pain. Off and on worse last 2 wk. Worse if lays on L side. No numbness or tingling.     R inside upper arm tenderness, no shoulder pain. Endo thought could be vit D def.     Saw endo yest for hypogly. Got addtl labs. Wearing CGM but current device malfunctioned  ?dumping syndrome     Saw [facility] onc this wk. Will check PET In a yr     Rpt endoscopy in feb for [facility] survellience   Current Outpatient Medications on File Prior to Visit   Medication Sig Dispense Refill    amLODIPine (Norvasc) 10 mg tablet Take 1 tablet (10 mg) by mouth once daily. 90 tablet 3    atorvastatin (Lipitor) 10 mg tablet TAKE 1 TABLET BY MOUTH EVERY DAY 90 tablet 3    calcium carbonate (Tums) 200 mg calcium chewable tablet Chew 1 tablet (500 mg) 4 times a day as needed for indigestion or heartburn.      esomeprazole (NexIUM) 40 mg DR capsule Take 1 capsule (40 mg) by mouth once daily in the morning. Take before meals. Do not open capsule. 30 capsule 2    FreeStyle Sophy 3 Sensor device Change sensor every 2 weeks 1 each 5    levothyroxine (Synthroid, Levoxyl) 75 mcg tablet TAKE 1 TABLET BY MOUTH EVERY DAY 90 tablet 3    ondansetron (Zofran) 4 mg tablet Take 2 tablets (8 mg) by mouth 2 times a day as needed for nausea or vomiting. 20 tablet 3    [DISCONTINUED] Dexcom G6 Sensor device Change every 10 days 3 each 11    [DISCONTINUED] Dexcom G6 Transmitter device Use as instructed 1 each 0    [DISCONTINUED] omeprazole OTC (PriLOSEC OTC) 20 mg EC tablet Take 1 tablet (20 mg) by mouth once daily in the morning. Take before meals. Do not crush, chew, or split.       Current Facility-Administered Medications on File Prior to Visit   Medication Dose Route Frequency Provider Last Rate Last Admin    perflutren lipid microspheres " "(Definity) injection 0.5-10 mL of dilution  0.5-10 mL of dilution intravenous Once in imaging Jose BRIGGS MD                      Objective   /68   Pulse 70   Temp 36.5 °C (97.7 °F)   Resp 16   Ht 1.537 m (5' 0.5\")   Wt 88 kg (194 lb)   SpO2 97%   BMI 37.26 kg/m²    Physical Exam  General: NAD  HEENT:NCAT, PERRLA, nml OP  Neck: no cervical KALPANA  Heart: RRR no murmur, no edema   Lungs: CTA b/l, no wheeze or rhonchi   MSK: no c/c/e.   L lateral hip pain, no lumbar pain  Nml LE strength/sens  Nml R shoulder ROM, pain along biceps. Nml strength   Skin: warm and dry  Psych: cooperative, appropriate affect  Neuro: speech clear. A&Ox3  Assessment/Plan   Problem List Items Addressed This Visit    None  Visit Diagnoses       Vitamin D deficiency    -  Primary  Check Vit D     Relevant Orders    Vitamin D 25 hydroxy    Trochanteric bursitis of left hip      Exam c/w busitis  Start medrol   If no improvement, [facility] ortho RF for inj       Relevant Medications    methylPREDNISolone (Medrol Dospak) 4 mg tablets    Other Relevant Orders    Referral to Orthopaedic Surgery    R UE pain: d/t overuse since hip pain   Medrol dose pack     Hypogly: undergoing endo w/up   Called jong rep and gave number to troubleshoot since her recent device defective  I rec to cont w CGM to give more data about episodes             "

## 2024-11-22 ENCOUNTER — APPOINTMENT (OUTPATIENT)
Dept: ENDOCRINOLOGY | Facility: CLINIC | Age: 69
End: 2024-11-22
Payer: MEDICARE

## 2024-11-22 VITALS — HEIGHT: 60 IN | WEIGHT: 194 LBS | BODY MASS INDEX: 38.09 KG/M2

## 2024-11-22 DIAGNOSIS — E03.9 HYPOTHYROIDISM, UNSPECIFIED TYPE: ICD-10-CM

## 2024-11-22 DIAGNOSIS — K91.2 HYPOGLYCEMIA AFTER GI (GASTROINTESTINAL) SURGERY: Primary | ICD-10-CM

## 2024-11-22 DIAGNOSIS — I10 BENIGN ESSENTIAL HYPERTENSION: ICD-10-CM

## 2024-11-22 DIAGNOSIS — M81.0 OSTEOPOROSIS, UNSPECIFIED OSTEOPOROSIS TYPE, UNSPECIFIED PATHOLOGICAL FRACTURE PRESENCE: ICD-10-CM

## 2024-11-22 LAB — TRYPTASE SERPL-MCNC: 10.7 UG/L

## 2024-11-22 PROCEDURE — 1111F DSCHRG MED/CURRENT MED MERGE: CPT | Performed by: INTERNAL MEDICINE

## 2024-11-22 PROCEDURE — 99215 OFFICE O/P EST HI 40 MIN: CPT | Performed by: INTERNAL MEDICINE

## 2024-11-22 PROCEDURE — 1157F ADVNC CARE PLAN IN RCRD: CPT | Performed by: INTERNAL MEDICINE

## 2024-11-22 PROCEDURE — 1159F MED LIST DOCD IN RCRD: CPT | Performed by: INTERNAL MEDICINE

## 2024-11-22 PROCEDURE — 3008F BODY MASS INDEX DOCD: CPT | Performed by: INTERNAL MEDICINE

## 2024-11-22 RX ORDER — METFORMIN HYDROCHLORIDE 500 MG/1
500 TABLET, EXTENDED RELEASE ORAL DAILY
Qty: 120 TABLET | Refills: 5 | Status: SHIPPED | OUTPATIENT
Start: 2024-11-22 | End: 2025-11-22

## 2024-11-22 NOTE — PROGRESS NOTES
"Patient ID: Marissa Mosquera \"Harman or Ms Mosquera\" is a 69 y.o. female who presents for New Patient Visit (Endocrine consult. Referred by Marlee Angela CNP for hypoglycemia.).  HPI  The patient is referred for evaluation of hypoglycemia.    This is a 69-year-old female known to me from 2022 when she was assessed for hypercalcemia with a history of 3.5 gland removal low vitamin D hypothyroidism prediabetes gastroparesis and late stage dumping.    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  It was well-differentiated.  Ascending gastrin and serotonin.    About 1.5 years ago she started developing hypoglycemic episodes which respond within 15 minutes to glucose.    She has started wearing the lexie 3 and that hypoglycemic events typically occur after glucose spikes as high as 250.    She was hospitalized end of October.  She had a mixed meal but did not become hypoglycemic then had a 100 g glucose tolerance test and did not become hypoglycemic and had a 21-hour prolonged fast was no spikes or hypoglycemia.    Of note in August she had a PET scan GA-68 dotatate scan which lit up in the duodenum only.    She does have a history of bariatric surgery and PEG 11 years ago that was reversed because of gastroparesis and late stage dumping.    She has had prediabetes although her hemoglobin A1c 2 days ago was 5.6%.    She also had SIBO.    She has hypothyroidism adequately replaced.    She has had compression fractures.    She states her diet is fairly balanced and she avoids simple sugar except when her blood sugar is low.    She has a past history as above.    Socially he is  retired non-smoker nondrinker.    Family history negative for thyroid or diabetes.      ROS  Comprehensive review of systems is negative.    Objective   Physical Exam  There were no vitals taken for this visit.   Vitals:    11/22/24 1451   Weight: 88 kg (194 lb)      Body mass " index is 37.57 kg/m².      Alert and oriented x3  In no distress  No focal neurologic deficits  No supraclavicular, or dorsal fat  No purple striae  Integument intact  Eyes normal  ENT normal. No adenopathy  Thyroid palpable and normal. No nodules  Chest clear to auscultation  Heart sounds are normal  Abdomen nontender. Bowel sounds normal. No organomegaly  Feet are okay  Reflexes normal with normal return    Current Outpatient Medications   Medication Sig Dispense Refill    amLODIPine (Norvasc) 10 mg tablet Take 1 tablet (10 mg) by mouth once daily. 90 tablet 3    atorvastatin (Lipitor) 10 mg tablet TAKE 1 TABLET BY MOUTH EVERY DAY 90 tablet 3    calcium carbonate (Tums) 200 mg calcium chewable tablet Chew 1 tablet (500 mg) 4 times a day as needed for indigestion or heartburn.      esomeprazole (NexIUM) 40 mg DR capsule Take 1 capsule (40 mg) by mouth once daily in the morning. Take before meals. Do not open capsule. 30 capsule 2    FreeStyle Sophy 3 Sensor device Change sensor every 2 weeks 1 each 5    levothyroxine (Synthroid, Levoxyl) 75 mcg tablet TAKE 1 TABLET BY MOUTH EVERY DAY 90 tablet 3    methylPREDNISolone (Medrol Dospak) 4 mg tablets Take as directed on package. 21 tablet 0    ondansetron (Zofran) 4 mg tablet Take 2 tablets (8 mg) by mouth 2 times a day as needed for nausea or vomiting. 20 tablet 3     No current facility-administered medications for this visit.     Facility-Administered Medications Ordered in Other Visits   Medication Dose Route Frequency Provider Last Rate Last Admin    perflutren lipid microspheres (Definity) injection 0.5-10 mL of dilution  0.5-10 mL of dilution intravenous Once in imaging Jose BRIGGS MD           Assessment/Plan     1.  Hypoglycemia  2.  Hypothyroidism  3.  Osteoporosis  4.  Hypertension  5.  Neuroendocrine tumor  6.  Prediabetes    We discussed the causes of hypoglycemia.    Given that they only occur after high blood sugars this appears to be more reactive  than anything else.    Given that there is no room for improvement with her diet would look at options for treatment.    We did discuss acarbose but she wants to hold off on that due to the side effects.    Will try metformin  mg once daily to see what impact that has.    She will await the results of the blood work drawn from 2 days ago.    She will follow-up with me in 3 months sooner as needed.    I spent 60 minutes with this patient.  Greater than 50% of this time was spent in counseling and/or coordination of care.

## 2024-11-24 LAB — GLUCAGON SERPL-MCNC: 176 NG/L

## 2024-11-25 LAB — VIP SERPL-MCNC: 51.2 PG/ML (ref 0–89.1)

## 2024-11-25 NOTE — PROGRESS NOTES
Latest Reference Range & Units 12/01/17 06:30 03/09/18 08:33 03/12/18 08:33 06/27/18 16:54 06/28/18 05:14 08/02/18 13:26 09/30/18 21:45 10/11/18 15:22 10/24/18 08:28 12/12/18 21:47 12/14/18 08:50 03/14/19 13:48 06/14/19 13:12 06/02/20 08:22 08/26/20 13:27 02/12/21 07:49 04/05/21 11:08 05/18/21 10:26 07/14/21 10:49 09/09/21 13:04 09/28/21 14:14 10/28/21 13:16 04/26/22 09:22 05/19/22 14:37 08/29/22 18:20 08/30/22 06:16 03/06/23 08:04 03/13/23 09:34 06/26/23 11:49 09/19/23 10:02 09/19/23 10:24 11/29/23 13:42 03/18/24 12:28 08/13/24 16:39 09/03/24 08:37 10/29/24 09:08 10/29/24 22:16 10/30/24 07:58 10/30/24 11:40 10/30/24 12:05 10/30/24 12:06 10/30/24 12:53 10/30/24 12:54 10/30/24 13:03 10/31/24 04:29 11/20/24 10:02   Hemoglobin A1C See comment %   5.6                    5.9 !        5.7 !  5.7 (H)             5.6   Parathyroid Hormone, Intact 18.5 - 88.0 pg/mL             55.5           102.6 (H)      57.9                   Beta-Hydroxybutyrate 0.02 - 0.27 mmol/L                                    0.14 0.13   0.12     0.25    C-Peptide 0.7 - 3.9 ng/mL                                    3.6 7.6 (H) 4.2 (H) 14.2 (H)   8.2 (H)   2.5 2.8   Insulin 3 - 25 uIU/mL                                     11  12  56 (H)  20 12 14  6    Thyroid Stimulating Hormone 0.44 - 3.98 mIU/L    1.84   3.09       3.63 1.98 2.27       1.65   2.42 2.30    2.67  2.50   1.95          2.45   Gastrin 0 - 100 pg/mL                                              211 (H)   Vitamin D, 25-Hydroxy, Total ng/mL             21 ! 27 !  16 !  29 ! 31 33   26 ! 30      30                   Insulin, Fasting 3 - 25 uIU/mL                                      10        9   GLUCOSE 74 - 99 mg/dL 84 93  74 85 92 116 (H) 95 92 88 95 97  98 125 (H) 86 92 90 97  207 (H) 85 91 82 89  89 78 102 (H) 86 83 86 85  89 99 82  175 (H) 109 (H)     89 101 (H)   Glucose, One hour mg/dL                                             89    Estimated Average Glucose Not Established  mg/dL   114                    123        117  117             114   Glucose, Fasting  mg/dL                                      99           CHROMOGRANIN A,LC/MS/MS ADULTS: <311 ng/mL                                  679 (H)               Glucagon <=208 ng/L                                              176   Proinsulin, Intact <=7.2 pmol/L                                     8.5 (H)       6.1 2.1    !: Data is abnormal  (H): Data is abnormally high    Reviewed her results. Her Gastrin his high as she is on PPI which can cause rebound affect. Her chromogranin is likely going to be high as well and unreliable due to PPI. We do not have a reliable cause from endocrinology perspective at this point for her. She saw Dr. Kay who started her on Metformin, she can continue to follow up with him.     Latest Reference Range & Units 11/20/24 10:02   CHROMOGRANIN A,LC/MS/MS ADULTS: <311 ng/mL 621 (H)   (H): Data is abnormally high      Chromogranin is high as expected, she was called and explained that it is likely due to her PPI. She was explained that we can not find any endocrine cause for her hypoglycemia.

## 2024-11-27 ENCOUNTER — TELEPHONE (OUTPATIENT)
Dept: HEMATOLOGY/ONCOLOGY | Facility: HOSPITAL | Age: 69
End: 2024-11-27

## 2024-12-02 LAB — CHROMOGRANIN A, LC/MS/MS: 621 NG/ML

## 2024-12-04 ENCOUNTER — APPOINTMENT (OUTPATIENT)
Dept: HEMATOLOGY/ONCOLOGY | Facility: HOSPITAL | Age: 69
End: 2024-12-04
Payer: MEDICARE

## 2024-12-04 ENCOUNTER — PATIENT MESSAGE (OUTPATIENT)
Dept: PRIMARY CARE | Facility: CLINIC | Age: 69
End: 2024-12-04
Payer: MEDICARE

## 2024-12-04 DIAGNOSIS — D3A.8: ICD-10-CM

## 2024-12-04 DIAGNOSIS — K31.84 GASTROPARESIS: ICD-10-CM

## 2024-12-04 DIAGNOSIS — K58.0 IRRITABLE BOWEL SYNDROME WITH DIARRHEA: ICD-10-CM

## 2024-12-04 DIAGNOSIS — K90.9 ABNORMAL INTESTINAL ABSORPTION (HHS-HCC): ICD-10-CM

## 2024-12-06 ENCOUNTER — OFFICE VISIT (OUTPATIENT)
Dept: ORTHOPEDIC SURGERY | Facility: HOSPITAL | Age: 69
End: 2024-12-06
Payer: MEDICARE

## 2024-12-06 ENCOUNTER — APPOINTMENT (OUTPATIENT)
Dept: RADIOLOGY | Facility: HOSPITAL | Age: 69
End: 2024-12-06
Payer: MEDICARE

## 2024-12-06 ENCOUNTER — HOSPITAL ENCOUNTER (OUTPATIENT)
Dept: RADIOLOGY | Facility: HOSPITAL | Age: 69
Discharge: HOME | End: 2024-12-06
Payer: MEDICARE

## 2024-12-06 ENCOUNTER — APPOINTMENT (OUTPATIENT)
Dept: ORTHOPEDIC SURGERY | Facility: HOSPITAL | Age: 69
End: 2024-12-06
Payer: MEDICARE

## 2024-12-06 DIAGNOSIS — M25.552 LEFT HIP PAIN: ICD-10-CM

## 2024-12-06 DIAGNOSIS — M76.892 HIP ABDUCTOR TENDONITIS, LEFT: Primary | ICD-10-CM

## 2024-12-06 DIAGNOSIS — M25.552 HIP PAIN, LEFT: ICD-10-CM

## 2024-12-06 PROCEDURE — 1157F ADVNC CARE PLAN IN RCRD: CPT | Performed by: FAMILY MEDICINE

## 2024-12-06 PROCEDURE — 99213 OFFICE O/P EST LOW 20 MIN: CPT | Performed by: FAMILY MEDICINE

## 2024-12-06 PROCEDURE — 73502 X-RAY EXAM HIP UNI 2-3 VIEWS: CPT | Mod: LT

## 2024-12-06 PROCEDURE — 1125F AMNT PAIN NOTED PAIN PRSNT: CPT | Performed by: FAMILY MEDICINE

## 2024-12-06 RX ORDER — DICLOFENAC SODIUM 10 MG/G
4 GEL TOPICAL 4 TIMES DAILY PRN
Qty: 200 G | Refills: 2 | Status: SHIPPED | OUTPATIENT
Start: 2024-12-06

## 2024-12-06 ASSESSMENT — PAIN - FUNCTIONAL ASSESSMENT: PAIN_FUNCTIONAL_ASSESSMENT: 0-10

## 2024-12-06 ASSESSMENT — PAIN SCALES - GENERAL: PAINLEVEL_OUTOF10: 6

## 2024-12-06 NOTE — PROGRESS NOTES
Sports Medicine Office Note    Today's Date:  12/06/2024     HPI: Marissa Mosquera is a 69 y.o. 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 Steven and lumbar back pain who presents today for left lateral hip pain.    Today, 12/6/2024, patient presents with chronic left lateral hip pain.  This started 6 months ago of glutes pain.  Denies trauma or injury.  Worse with prolonged standing, stairs, prolonged sitting.  Denies numbness, tingling, urinary or bowel incontinence.  She has not done any formal physical therapy or injection to this area.  She has been doing Tylenol 325 mg twice daily, heat, ice with minimal improvement.  She has history of back pain that received 1 epidural injection in 2023 that was ineffective.    She has no other complaints.    Physical Examination:     The Left hip and pelvis are without obvious signs of acute bony deformity or instability. Active and passive range of motion are full and pain-free.  Tender over gluteus medius and greater trochanter.  No tenderness over IT band.  Log roll is negative. Straight leg raise test is negative. Baltazar is negative. Crossover is positive.  Pain with resisted abduction and extension of left hip. Hip strength is normal as compared to the opposite hip. The opposite hip is otherwise nontender and stable. Gait is antalgic and tandem.    Imaging:  Radiographs of the left hip with pelvis obtained on 12/6/2024 were reviewed and revealed no acute fracture or dislocation.  Minimal degenerative changes of left hips.  The studies were reviewed by me personally in the office today.    Problem List Items Addressed This Visit    None  Visit Diagnoses         Codes    Hip abductor tendonitis, left    -  Primary M76.892    Relevant Medications    diclofenac sodium (Voltaren Arthritis Pain) 1 % gel    Other Relevant Orders    Referral to Physical Therapy    Hip pain, left     M25.552    Relevant Medications    diclofenac sodium (Voltaren  Arthritis Pain) 1 % gel    Other Relevant Orders    XR hip left with pelvis when performed 2 or 3 views    Referral to Physical Therapy            Assessment and Plan:  We reviewed the exam and x-ray findings and discussed the conservative and surgical treatment options.  Her left lateral hip pain is most likely due to left gluteus tendinitis. We agreed to optimize Voltaren and Tylenol use with referral to physical therapy for strengthening.  Follow-up in 6 weeks.    **This note was dictated using Dragon speech recognition software and was not corrected for spelling or grammatical errors**.    Francois Andrew MD  Primary Care Sports Medicine Fellow  [facility] [place]  [facility]

## 2024-12-06 NOTE — LETTER
December 6, 2024     Brittany Behm, DO  [address] [facility], [address]  [place] [address]    Patient: Harman or Ms Eveline Mosquera   YOB: 1955   Date of Visit: 12/6/2024       Dear Dr. Brittany Behm, DO:    Thank you for referring Harman or Ms Eveline Mosquera to me for evaluation. Below are my notes for this consultation.  If you have questions, please do not hesitate to call me. I look forward to following your patient along with you.       Sincerely,     Marcin Shirley MD      CC: No Recipients  ______________________________________________________________________________________    Sports Medicine Office Note    Today's Date:  12/06/2024     HPI: Marissa Mosquera is a 69 y.o. 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 Steven and lumbar back pain who presents today for left lateral hip pain.    Today, 12/6/2024, patient presents with chronic left lateral hip pain.  This started 6 months ago of glutes pain.  Denies trauma or injury.  Worse with prolonged standing, stairs, prolonged sitting.  Denies numbness, tingling, urinary or bowel incontinence.  She has not done any formal physical therapy or injection to this area.  She has been doing Tylenol 325 mg twice daily, heat, ice with minimal improvement.  She has history of back pain that received 1 epidural injection in 2023 that was ineffective.    She has no other complaints.    Physical Examination:     The Left hip and pelvis are without obvious signs of acute bony deformity or instability. Active and passive range of motion are full and pain-free.  Tender over gluteus medius and greater trochanter.  No tenderness over IT band.  Log roll is negative. Straight leg raise test is negative. Baltazar is negative. Crossover is positive.  Pain with resisted abduction and extension of left hip. Hip strength is normal as compared to the opposite hip. The opposite hip is otherwise nontender and stable. Gait is  antalgic and tandem.    Imaging:  Radiographs of the left hip with pelvis obtained on 12/6/2024 were reviewed and revealed no acute fracture or dislocation.  Minimal degenerative changes of left hips.  The studies were reviewed by me personally in the office today.    Problem List Items Addressed This Visit    None  Visit Diagnoses         Codes    Hip abductor tendonitis, left    -  Primary M76.892    Relevant Medications    diclofenac sodium (Voltaren Arthritis Pain) 1 % gel    Other Relevant Orders    Referral to Physical Therapy    Hip pain, left     M25.552    Relevant Medications    diclofenac sodium (Voltaren Arthritis Pain) 1 % gel    Other Relevant Orders    XR hip left with pelvis when performed 2 or 3 views    Referral to Physical Therapy            Assessment and Plan:  We reviewed the exam and x-ray findings and discussed the conservative and surgical treatment options.  Her left lateral hip pain is most likely due to left gluteus tendinitis. We agreed to optimize Voltaren and Tylenol use with referral to physical therapy for strengthening.  Follow-up in 6 weeks.    **This note was dictated using Dragon speech recognition software and was not corrected for spelling or grammatical errors**.    Francois Andrew MD  Primary Care Sports Medicine Fellow  [facility] [place]  [facility]    Attestation with edits by Marcin Shirley MD at 12/6/2024  2:14 PM:    Attending Note     Trainee role: Fellow    Trainee discussed patient with Dr. Shirley          I saw and evaluated the patient. I personally obtained the key and critical portions of the history and physical exam or was physically present for key and critical portions performed by the trainee. I reviewed the trainee's documentation and discussed the patient with the trainee. I agree with the trainee's medical decision making, as documented on the trainee's note.     **She has classic hip abductor tendinitis that  should do well with topical medication and physical therapy.    Marcin Shirley MD  Sports Medicine Specialist  [facility] [place]

## 2024-12-07 ENCOUNTER — OFFICE VISIT (OUTPATIENT)
Dept: URGENT CARE | Age: 69
End: 2024-12-07
Payer: MEDICARE

## 2024-12-07 VITALS
OXYGEN SATURATION: 97 % | WEIGHT: 194 LBS | TEMPERATURE: 97.8 F | BODY MASS INDEX: 37.57 KG/M2 | DIASTOLIC BLOOD PRESSURE: 81 MMHG | HEART RATE: 82 BPM | SYSTOLIC BLOOD PRESSURE: 137 MMHG

## 2024-12-07 DIAGNOSIS — J40 BRONCHITIS: Primary | ICD-10-CM

## 2024-12-07 RX ORDER — AZITHROMYCIN 250 MG/1
TABLET, FILM COATED ORAL
Qty: 6 TABLET | Refills: 0 | Status: SHIPPED | OUTPATIENT
Start: 2024-12-07 | End: 2024-12-11

## 2024-12-07 RX ORDER — BENZONATATE 200 MG/1
200 CAPSULE ORAL 3 TIMES DAILY PRN
Qty: 20 CAPSULE | Refills: 0 | Status: SHIPPED | OUTPATIENT
Start: 2024-12-07 | End: 2024-12-14

## 2024-12-07 ASSESSMENT — ENCOUNTER SYMPTOMS: COUGH: 1

## 2024-12-07 NOTE — PROGRESS NOTES
"Subjective   Patient ID: Marissa Mosquera \"Harman or Ms Mosquera\" is a 69 y.o. female. They present today with a chief complaint of Cough (Productive cough x 7 days).    History of Present Illness  Marissa is a 69 year old female pmhx of HTN presents to  with progressive cough x 7 days. Seems to be worsening the past few days. Cough at times productive of yellow sputum. No fevers, SOB or CP. She has been using coricidin HBP.       Cough        Past Medical History  Allergies as of 12/07/2024    (No Known Allergies)       (Not in a hospital admission)       Past Medical History:   Diagnosis Date    Anesthesia of skin 04/20/2015    Finger numbness    Arthritis 2008    Cataract 2018    Chronic kidney disease 2023    Conjunctival cysts, left eye 08/14/2018    Conjunctival cyst of left eye    Disease of thyroid gland 1985    GERD (gastroesophageal reflux disease) 1976    Hypertension 2015    Hypertensive retinopathy, bilateral 08/20/2019    Hypertensive retinopathy of both eyes    Other conditions influencing health status 02/15/2017    Compression fracture    Peptic ulceration 2023    Personal history of diseases of the skin and subcutaneous tissue 11/01/2018    History of urticaria    Personal history of other diseases of the circulatory system 11/01/2018    History of hypertension    Personal history of other specified conditions 10/31/2018    History of diarrhea    Personal history of other specified conditions 07/27/2015    History of vertigo    PONV (postoperative nausea and vomiting)     Retinal hemorrhage, left eye 02/13/2018    Retinal hemorrhage of left eye    Varicella 1957    Visual impairment 1963       Past Surgical History:   Procedure Laterality Date    BREAST BIOPSY      rt benign core    BREAST SURGERY  2007    COLONOSCOPY  06/08/2016    Complete Colonoscopy    CT ANGIO NECK  06/28/2018    CT NECK ANGIO W AND WO IV CONTRAST 6/28/2018 [facility]    CT ANGIO NECK  08/29/2022    CT NECK ANGIO W AND WO " IV CONTRAST 8/29/2022 [facility]    CT HEAD ANGIO W AND WO IV CONTRAST  06/28/2018    CT HEAD ANGIO W AND WO IV CONTRAST 6/28/2018 [facility]    CT HEAD ANGIO W AND WO IV CONTRAST  08/29/2022    CT HEAD ANGIO W AND WO IV CONTRAST 8/29/2022 [facility]    FRACTURE SURGERY  1980    GASTRIC FUNDOPLICATION  06/29/2017    Esophagogastric Fundoplasty Nissen Fundoplication    JOINT REPLACEMENT  2011, 2021,2022    MR HEAD ANGIO WO IV CONTRAST  09/29/2021    MR HEAD ANGIO WO IV CONTRAST 9/29/2021 [facility]    OTHER SURGICAL HISTORY  07/17/2017    Esophagogastric Fundoplasty Laparoscopic For Paraesophageal Hernia Repair    OTHER SURGICAL HISTORY  07/17/2017    Gastroplasty Longitudinal    OTHER SURGICAL HISTORY  07/17/2017    Esophageal Lengthening    OTHER SURGICAL HISTORY  07/17/2017    Repair Of Paraesophageal Hiatus Hernia    OTHER SURGICAL HISTORY  12/18/2017    Laparoscopy Repair Of Hernia    OTHER SURGICAL HISTORY  03/05/2019    Shoulder replacement    OTHER SURGICAL HISTORY  04/22/2015    Treatment Of Wrist Fracture    OTHER SURGICAL HISTORY  04/22/2015    Open Treatment Of Clavicular Fracture    OTHER SURGICAL HISTORY  04/22/2015    Breast Surgery Excision Of Breast Single Lesion    OTHER SURGICAL HISTORY  04/22/2015    Parathyroid Complete Parathyroidectomy    SMALL INTESTINE SURGERY  11/2016    SPINE SURGERY  2019    TOTAL KNEE ARTHROPLASTY  04/22/2015    Knee Replacement    TOTAL SHOULDER ARTHROPLASTY Bilateral         reports that she has never smoked. She has never used smokeless tobacco. She reports that she does not currently use alcohol. She reports that she does not use drugs.    Review of Systems  Review of Systems   Respiratory:  Positive for cough.                                   Objective    Vitals:    12/07/24 0821   BP: 137/81   Pulse: 82   Temp: 36.6 °C (97.8 °F)   TempSrc: Oral   SpO2: 97%   Weight: 88 kg (194 lb)     No LMP recorded. Patient is  postmenopausal.    Physical Exam  Vitals and nursing note reviewed.   Constitutional:       General: She is not in acute distress.     Appearance: Normal appearance.   HENT:      Head: Normocephalic and atraumatic.      Right Ear: Tympanic membrane and ear canal normal.      Left Ear: Tympanic membrane and ear canal normal.      Nose: No congestion or rhinorrhea.      Mouth/Throat:      Mouth: Mucous membranes are moist.      Pharynx: No oropharyngeal exudate or posterior oropharyngeal erythema.   Eyes:      Extraocular Movements: Extraocular movements intact.      Conjunctiva/sclera: Conjunctivae normal.      Pupils: Pupils are equal, round, and reactive to light.   Cardiovascular:      Rate and Rhythm: Normal rate and regular rhythm.      Heart sounds: No murmur heard.  Pulmonary:      Effort: Pulmonary effort is normal.      Breath sounds: Normal breath sounds. No wheezing.      Comments: Harsh frequent cough   Skin:     General: Skin is warm and dry.   Neurological:      General: No focal deficit present.      Mental Status: She is alert and oriented to person, place, and time.   Psychiatric:         Mood and Affect: Mood normal.         Procedures    Point of Care Test & Imaging Results from this visit  No results found for this visit on 12/07/24.   No results found.    Diagnostic study results (if any) were reviewed by [facility].    Assessment/Plan   Allergies, medications, history, and pertinent labs/EKGs/Imaging reviewed by Nelida Del Rio PA-C.     Medical Decision Making  Pt presents with cough and chest congestion. Discussed symptoms and clinical presentation findings suggestive of an acute bronchitis likely secondary to viral URI. Advised continued close symptom monitoring and supportive treatment measures. Recommend OTC cough medication as needed for added symptom relief. Given the patient's duration of symptoms and lack of improvement with supportive treatment we agreed to initiate  antimicrobial coverage and prescribed zithromax, medrol.  Close follow up with PCP as needed.      Orders and Diagnoses  Diagnoses and all orders for this visit:  Bronchitis  -     benzonatate (Tessalon) 200 mg capsule; Take 1 capsule (200 mg) by mouth 3 times a day as needed for cough for up to 7 days. Do not crush or chew.  -     azithromycin (Zithromax) 250 mg tablet; Take 2 tablets (500 mg) by mouth once daily for 1 day, THEN 1 tablet (250 mg) once daily for 4 days.      Medical Admin Record      Patient disposition: Home    Electronically signed by [facility]  8:48 AM

## 2024-12-10 ENCOUNTER — APPOINTMENT (OUTPATIENT)
Dept: HEMATOLOGY/ONCOLOGY | Facility: CLINIC | Age: 69
End: 2024-12-10
Payer: MEDICARE

## 2024-12-11 DIAGNOSIS — K91.2 HYPOGLYCEMIA AFTER GI (GASTROINTESTINAL) SURGERY: Primary | ICD-10-CM

## 2024-12-13 DIAGNOSIS — K91.2 HYPOGLYCEMIA AFTER GI (GASTROINTESTINAL) SURGERY: Primary | ICD-10-CM

## 2024-12-18 ENCOUNTER — HOSPITAL ENCOUNTER (OUTPATIENT)
Dept: RADIOLOGY | Facility: HOSPITAL | Age: 69
Discharge: HOME | End: 2024-12-18
Payer: MEDICARE

## 2024-12-18 DIAGNOSIS — K91.2 HYPOGLYCEMIA AFTER GI (GASTROINTESTINAL) SURGERY: ICD-10-CM

## 2024-12-18 PROCEDURE — 74183 MRI ABD W/O CNTR FLWD CNTR: CPT

## 2024-12-18 PROCEDURE — 74183 MRI ABD W/O CNTR FLWD CNTR: CPT | Performed by: RADIOLOGY

## 2024-12-18 PROCEDURE — 72197 MRI PELVIS W/O & W/DYE: CPT | Performed by: RADIOLOGY

## 2024-12-18 PROCEDURE — A9575 INJ GADOTERATE MEGLUMI 0.1ML: HCPCS | Performed by: INTERNAL MEDICINE

## 2024-12-18 PROCEDURE — 2550000001 HC RX 255 CONTRASTS: Performed by: INTERNAL MEDICINE

## 2024-12-18 RX ORDER — GADOTERATE MEGLUMINE 376.9 MG/ML
18 INJECTION INTRAVENOUS
Status: COMPLETED | OUTPATIENT
Start: 2024-12-18 | End: 2024-12-18

## 2025-01-17 ENCOUNTER — APPOINTMENT (OUTPATIENT)
Dept: ORTHOPEDIC SURGERY | Facility: HOSPITAL | Age: 70
End: 2025-01-17
Payer: MEDICARE

## 2025-01-24 ENCOUNTER — APPOINTMENT (OUTPATIENT)
Dept: PRIMARY CARE | Facility: CLINIC | Age: 70
End: 2025-01-24
Payer: MEDICARE

## 2025-01-28 DIAGNOSIS — K31.89 DUODENAL NODULE: ICD-10-CM

## 2025-01-28 RX ORDER — ESOMEPRAZOLE MAGNESIUM 40 MG/1
40 CAPSULE, DELAYED RELEASE ORAL
Qty: 30 CAPSULE | Refills: 2 | Status: SHIPPED | OUTPATIENT
Start: 2025-01-28 | End: 2025-04-28

## 2025-02-03 ENCOUNTER — EVALUATION (OUTPATIENT)
Dept: PHYSICAL THERAPY | Facility: CLINIC | Age: 70
End: 2025-02-03
Payer: MEDICARE

## 2025-02-03 DIAGNOSIS — M76.892 HIP ABDUCTOR TENDONITIS, LEFT: ICD-10-CM

## 2025-02-03 DIAGNOSIS — M25.552 HIP PAIN, LEFT: ICD-10-CM

## 2025-02-03 PROCEDURE — 97161 PT EVAL LOW COMPLEX 20 MIN: CPT | Mod: GP

## 2025-02-03 PROCEDURE — 97110 THERAPEUTIC EXERCISES: CPT | Mod: GP

## 2025-02-03 ASSESSMENT — ENCOUNTER SYMPTOMS
DEPRESSION: 0
LOSS OF SENSATION IN FEET: 0
OCCASIONAL FEELINGS OF UNSTEADINESS: 1

## 2025-02-03 NOTE — PROGRESS NOTES
"  Physical Therapy  Physical Therapy Orthopedic Evaluation    Patient Name: Marissa Mosquera \"Harman or Ms Mosquera\"  MRN: [id number]  Today's Date: 2/3/2025  Time Calculation  Start Time: 1231  Stop Time: 1325  Time Calculation (min): 54 min    Insurance:  Visit number: 1 of [place]  Authorization info: No Auth  Insurance Type: Medicare and MMO Medicare Supp  Medicare Certification Period: Beginnin/3/2025 Endin2025  Onset date: 2024    General:  Reason for visit: Left Hip Pain  Referred by: Marcin Shirley    Current Problem:  1. Hip pain, left  Referral to Physical Therapy    Follow Up In Physical Therapy      2. Hip abductor tendonitis, left  Referral to Physical Therapy    Follow Up In Physical Therapy          Precautions: Osteoporosis  Precautions  STEADI Fall Risk Score (The score of 4 or more indicates an increased risk of falling): 6      Medical History Form: Reviewed (scanned into chart)    Subjective:   Subjective   Chief Complaint: Patient presents to clinic with L trochanteric bursitis.  Symptoms began in late August/early 2024.  Initially thought it was due to back issues and dismissed it.  Worsened and was unable to lie on her left side and ascend stairs due to pain.  Saw Dr. Shirley and was prescribed Diclofenac Sodium gel.  This has helped some, but pain is still severe and significantly limits her activity tolerance.   Onset Date: 2024  ULYSSES: Insidious    Current Condition:   Better    Pain:  Pain level currently: 0/10 (sitting)  Pain level at worst: 10/10  Pain level at best: 0/10  Location: Localized to left lateral hip, occasional lateral lower leg pain  Description: sharp, throbbing  Aggravating Factors: Walking, sidelying left, stairs  Relieving Factors:  Sitting    Relevant Information:  PMH: Hx of complicated R TKA with infection in .  R knee instability. Hx of herniated disc with L sciatica.  R shoulder pain.  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 Having surgery with EDG on Friday . " Gout left foot. Osteoporosis with previous fx of L1 and C4-C5-C6 (surgery 2018)  Previous Tests/Imaging: X-ray  Previous Interventions/Treatments: Diclofenac Sodium Gel 2x/day, heat, Tylenol    Prior Level of Function (PLOF)  Patient previously independent with all ADLs. Currently at 25% of PLOF.  Functional limitations: Walking > household distances.  Leans on cart when shopping.  Standing >5-10 min. Non-reciprocal stairs with use of railing. Difficulty getting in and out of the car.  Exercise/Physical Activity: None currently, Member of Hipbone  Work/School: Retired Nurse    Patients Living Environment: [address].  Lives with .     Primary Language: English    Patient's Goal(s) for Therapy: Improve mobility    Red Flags: Do you have any of the following? No  Fever/chills, unexplained weight changes, dizziness/fainting, unexplained change in bowel or bladder functions, unexplained malaise or muscle weakness, night pain/sweats, numbness or tingling    Objective:  Objective     Posture: Rounded shoulders, mild kyphosis    Gait: Ambulates without assistive device, decreased stride length on LLE with uncompensated Trendelenburg. Intermittent shuffling/reduced clearance of R foot during swing phase    Palpation: Very tender L GTB      ROM    Lumbar AROM (%)    Flexion: 100%  Extension: 85%, ERP  (R) Side Bend: 80%  (L) Side Bend: 80%  (R) Rotation: 90%  (L) Rotation: 90%      Hip AROM (Degrees)       (R)  (L)  ER:   40  30, tight    IR:   38  45             Hip PROM (Degrees)       (R)  (L)  Flexion:  120  120, P! Lateral hip      Abduction:  30  30      ER:   50  40, pain    IR:   40  45      Knee AROM (Degrees)       (R)  (L)  Flexion:  119  120   Extension:  0  0        Strength Testing    Hip     (R)  (L)  Flexion:  4  4     Extension:  Good lift with bridge; pushes more with RLE than LLE    Abduction:  3-  3             Knee     (R)  (L)  Flexion:  5  5     Extension:  4  4     Core  Control:  Abdominals: 3/5  Pelvic Bridge: Good lift; pushes more with RLE than LLE         Flexibility       (R)  (L)  Hamstrings:  WNL  WNL         Functional Movement  Sit to stand: UE assist  Bed Mobility: Independent, discomfort with side-lying       Balance  Double limb support with eyes closed: Increased sway  Single Leg Balance:  (R)Unable (L)Unable      Neuro:    Dermatomes: Intact to light touch    Special Tests  Slump Test: (-)  Radicular Symptoms: (-)  ADRIEL Test: Pain and limited mobility on L       Outcome Measures:  Other Measures  Lower Extremity Funtional Score (LEFS): 5/80       Goals: Set and discussed today  Active       PT Problem       PT Goal 1       Start:  02/03/25    Expected End:  03/03/25       Patient will demonstrate home exercise program adherence to supplement symptom reduction and functional gains made in clinic.  Sit-stand with minimal UE reliance.          PT Goal 2       Start:  02/03/25    Expected End:  04/28/25         Stand 15 minutes with minimal difficulty.  Walk 15 minutes with minimal difficulty and with demonstration of good gait mechanics.  Left hip abductor strength improved to at least 4/5 on L and R for improvements in gait and functional mobility.  Able to lie on her left side without pain > 4/10 in L hip region.   Ascend/descend stairs with reciprocal pattern with use of railing.   LEFS score improved by at least 15 points.  Patient will rate pain < 4/10 at worst in L lateral hip to improve ADL tolerance.  Independent with home exercise program for symptom reduction and functional gains.              Plan of care was developed with input and agreement by the patient    Treatment Performed:    Therapeutic Exercise:    15 min  Instructed in personalized Home Exercise program  Access Code: [email or web address]  URL: [email or web address]  Date: 02/03/2025  Prepared by: Blanche Raza    Exercises  - Beginner Bridge  - 1 x daily - 7 x weekly - 3 sets - 10  reps - 5 second hold  - Supine Hip Adduction Isometric with Ball  - 1 x daily - 7 x weekly - 20 reps - 5 seconds hold  - Supine March  - 1 x daily - 7 x weekly - 3 sets - 10 reps  - Supine Hip Abduction  - 1 x daily - 7 x weekly - 3 sets - 10 reps  - Small Range Straight Leg Raise  - 1 x daily - 7 x weekly - 3 sets - 10 reps  - Clamshell  - 1 x daily - 7 x weekly - 3 sets - 10 reps    Self Care/Education:     5 min  Evaluation findings  Plan of care  Falls risk  Oriented to pool        Charges: LC Eval x 1, TE x 1    Assessment:   Patient presents to PT with with signs and symptoms consistent with L greater trochanteric bursitis and hip abductor tendinopathy. Upon examination, patient demonstrates altered gait, core and glut weakness (especially in glut med), tenderness to GTB/lateral hip, and decreased balance control.  Functional limitations include difficulty with walking, standing, stairs, sit-stand and car tranfers, and performance of ADLs.  Patient would benefit from course of PT to address these impairments, reduce pain, and assist patient in returning to [facility].           Clinical Presentation: Stable and/or uncomplicated characteristics  Level of Complexity: Low    Plan:     Planned Interventions include: therapeutic exercise, self-care home management, manual therapy, therapeutic activities, gait training, neuromuscular coordination, aquatic therapy    Frequency: 1-2 x Week (1st couple of sessions of aquatic therapy then transition to land based program)  Duration: 10-12 Weeks  Rehab Potential/Prognosis: Franco Raza, PT

## 2025-02-04 ENCOUNTER — OFFICE VISIT (OUTPATIENT)
Dept: ORTHOPEDIC SURGERY | Facility: HOSPITAL | Age: 70
End: 2025-02-04
Payer: MEDICARE

## 2025-02-04 DIAGNOSIS — M76.892 HIP ABDUCTOR TENDONITIS, LEFT: Primary | ICD-10-CM

## 2025-02-04 PROCEDURE — 1157F ADVNC CARE PLAN IN RCRD: CPT | Performed by: FAMILY MEDICINE

## 2025-02-04 PROCEDURE — 1159F MED LIST DOCD IN RCRD: CPT | Performed by: FAMILY MEDICINE

## 2025-02-04 PROCEDURE — 99213 OFFICE O/P EST LOW 20 MIN: CPT | Performed by: FAMILY MEDICINE

## 2025-02-04 NOTE — PROGRESS NOTES
Sports Medicine Office Note    Today's Date:  02/04/2025     HPI: Marissa Mosquera is a 69 y.o. 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 Steven and lumbar back pain who presents today for left lateral hip pain.    Today, 12/6/2024, patient presents with chronic left lateral hip pain.  This started 6 months ago of glutes pain.  Denies trauma or injury.  Worse with prolonged standing, stairs, prolonged sitting.  Denies numbness, tingling, urinary or bowel incontinence.  She has not done any formal physical therapy or injection to this area.  She has been doing Tylenol 325 mg twice daily, heat, ice with minimal improvement.  She has history of back pain that received 1 epidural injection in 2023 that was ineffective.    She has no other complaints.    Physical Examination:     The Left hip and pelvis are without obvious signs of acute bony deformity or instability. Active and passive range of motion are full and pain-free.  Tender over gluteus medius and greater trochanter.  No tenderness over IT band.  Log roll is negative. Straight leg raise test is negative. Baltazar is negative. Crossover is positive.  Pain with resisted abduction and extension of left hip. Hip strength is normal as compared to the opposite hip. The opposite hip is otherwise nontender and stable. Gait is antalgic and tandem.    Imaging:  Radiographs of the left hip with pelvis obtained on 12/6/2024 were reviewed and revealed no acute fracture or dislocation.  Minimal degenerative changes of left hips.  The studies were reviewed by me personally in the office today.    1. Hip abductor tendonitis, left          Assessment and Plan:  We reviewed the exam and x-ray findings and discussed the conservative and surgical treatment options.  Her left lateral hip pain is most likely due to left gluteus tendinitis. We agreed to optimize Voltaren and Tylenol use with referral to physical therapy for strengthening.  Follow-up in 6  weeks.    **This note was dictated using Dragon speech recognition software and was not corrected for spelling or grammatical errors**.    Marcin Shirley MD  Sports Medicine Specialist  [facility] [place]

## 2025-02-11 ENCOUNTER — APPOINTMENT (OUTPATIENT)
Dept: PHYSICAL THERAPY | Facility: CLINIC | Age: 70
End: 2025-02-11
Payer: MEDICARE

## 2025-02-12 ENCOUNTER — TELEPHONE (OUTPATIENT)
Dept: PRIMARY CARE | Facility: CLINIC | Age: 70
End: 2025-02-12
Payer: MEDICARE

## 2025-02-12 ENCOUNTER — APPOINTMENT (OUTPATIENT)
Dept: GASTROENTEROLOGY | Facility: HOSPITAL | Age: 70
End: 2025-02-12
Payer: MEDICARE

## 2025-02-12 NOTE — TELEPHONE ENCOUNTER
Patient called to cancel here her appointment with Dr Behm for Tuesday . The reason is her cancer has come back ,and she is scheduled for treatment on tuesdays. Patient would like to know if she should reschedule now  or wait until after a round of treatment?   Please advise

## 2025-02-18 ENCOUNTER — HOSPITAL ENCOUNTER (OUTPATIENT)
Dept: RADIOLOGY | Facility: HOSPITAL | Age: 70
Discharge: HOME | End: 2025-02-18
Payer: MEDICARE

## 2025-02-18 ENCOUNTER — APPOINTMENT (OUTPATIENT)
Dept: PRIMARY CARE | Facility: CLINIC | Age: 70
End: 2025-02-18
Payer: MEDICARE

## 2025-02-18 ENCOUNTER — APPOINTMENT (OUTPATIENT)
Dept: PHYSICAL THERAPY | Facility: CLINIC | Age: 70
End: 2025-02-18
Payer: MEDICARE

## 2025-02-18 DIAGNOSIS — E16.2 HYPOGLYCEMIA, UNSPECIFIED: ICD-10-CM

## 2025-02-18 DIAGNOSIS — D3A.8 OTHER BENIGN NEUROENDOCRINE TUMORS (CMS-HCC): ICD-10-CM

## 2025-02-18 PROCEDURE — 78815 PET IMAGE W/CT SKULL-THIGH: CPT | Mod: PS

## 2025-02-24 DIAGNOSIS — E78.5 HYPERLIPIDEMIA, UNSPECIFIED: ICD-10-CM

## 2025-02-24 DIAGNOSIS — E03.9 HYPOTHYROIDISM, UNSPECIFIED: ICD-10-CM

## 2025-02-24 RX ORDER — ATORVASTATIN CALCIUM 10 MG/1
10 TABLET, FILM COATED ORAL DAILY
Qty: 90 TABLET | Refills: 3 | Status: SHIPPED | OUTPATIENT
Start: 2025-02-24

## 2025-02-24 RX ORDER — LEVOTHYROXINE SODIUM 75 UG/1
75 TABLET ORAL DAILY
Qty: 90 TABLET | Refills: 3 | Status: SHIPPED | OUTPATIENT
Start: 2025-02-24

## 2025-02-26 ENCOUNTER — TELEPHONE (OUTPATIENT)
Dept: HEMATOLOGY/ONCOLOGY | Facility: HOSPITAL | Age: 70
End: 2025-02-26
Payer: MEDICARE

## 2025-02-26 DIAGNOSIS — C7A.8 NEUROENDOCRINE CANCER: Primary | ICD-10-CM

## 2025-02-26 NOTE — TELEPHONE ENCOUNTER
"Spoke with patient who states she feels her tumor is getting worse.   She reports dramatic swings in blood glucose throughout the day.   Yesterday she states she went into \"insulin shock\" 5 times.   She is unable to function.  Patient reports endoscopy done in February by Dr. Mann did not show anything.   However, she feels something is not right based on her blood glucose levels.   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   A PET CT net netspot was done on 2/18/25.   Message sent to Dr. Sofia.      Per Dr. Sofia patients last PET scan done 2/11/25 was negative.   He ordered labs to be done fasting for her.   Patient informed.   She will have them drawn tomorrow.   "

## 2025-02-27 ENCOUNTER — TELEPHONE (OUTPATIENT)
Dept: ENDOCRINOLOGY | Facility: CLINIC | Age: 70
End: 2025-02-27
Payer: MEDICARE

## 2025-02-28 ENCOUNTER — TELEPHONE (OUTPATIENT)
Dept: HEMATOLOGY/ONCOLOGY | Facility: HOSPITAL | Age: 70
End: 2025-02-28
Payer: MEDICARE

## 2025-02-28 LAB
C PEPTIDE SERPL-MCNC: 2.91 NG/ML (ref 0.8–3.85)
INSULIN SERPL-ACNC: 7 UIU/ML
PROINSULIN SERPL-SCNC: NORMAL PMOL/L

## 2025-03-03 ENCOUNTER — LAB (OUTPATIENT)
Dept: LAB | Facility: CLINIC | Age: 70
End: 2025-03-03
Payer: MEDICARE

## 2025-03-03 ENCOUNTER — TELEPHONE (OUTPATIENT)
Dept: HEMATOLOGY/ONCOLOGY | Facility: HOSPITAL | Age: 70
End: 2025-03-03
Payer: MEDICARE

## 2025-03-03 ENCOUNTER — HOSPITAL ENCOUNTER (OUTPATIENT)
Dept: RADIOLOGY | Facility: CLINIC | Age: 70
Discharge: HOME | End: 2025-03-03
Payer: MEDICARE

## 2025-03-03 DIAGNOSIS — D3A.8 OTHER BENIGN NEUROENDOCRINE TUMORS (CMS-HCC): ICD-10-CM

## 2025-03-03 DIAGNOSIS — C7A.8 NEUROENDOCRINE CANCER: Primary | ICD-10-CM

## 2025-03-03 DIAGNOSIS — D13.7 BENIGN NEOPLASM OF ENDOCRINE PANCREAS: ICD-10-CM

## 2025-03-03 DIAGNOSIS — E16.2 HYPOGLYCEMIA, UNSPECIFIED: ICD-10-CM

## 2025-03-03 DIAGNOSIS — C7A.8 NEUROENDOCRINE CANCER: ICD-10-CM

## 2025-03-03 LAB
ALBUMIN SERPL BCP-MCNC: 4.3 G/DL (ref 3.4–5)
ALP SERPL-CCNC: 97 U/L (ref 33–136)
ALT SERPL W P-5'-P-CCNC: 11 U/L (ref 7–45)
ANION GAP SERPL CALC-SCNC: 15 MMOL/L (ref 10–20)
AST SERPL W P-5'-P-CCNC: 15 U/L (ref 9–39)
BASOPHILS # BLD AUTO: 0.01 X10*3/UL (ref 0–0.1)
BASOPHILS NFR BLD AUTO: 0.2 %
BILIRUB SERPL-MCNC: 0.5 MG/DL (ref 0–1.2)
BUN SERPL-MCNC: 18 MG/DL (ref 6–23)
CALCIUM SERPL-MCNC: 9.3 MG/DL (ref 8.6–10.6)
CHLORIDE SERPL-SCNC: 105 MMOL/L (ref 98–107)
CO2 SERPL-SCNC: 27 MMOL/L (ref 21–32)
CREAT SERPL-MCNC: 1.29 MG/DL (ref 0.5–1.05)
CREAT SERPL-MCNC: 1.3 MG/DL (ref 0.6–1.3)
EGFRCR SERPLBLD CKD-EPI 2021: 45 ML/MIN/1.73M*2
EOSINOPHIL # BLD AUTO: 0.17 X10*3/UL (ref 0–0.7)
EOSINOPHIL NFR BLD AUTO: 2.9 %
ERYTHROCYTE [DISTWIDTH] IN BLOOD BY AUTOMATED COUNT: 13.4 % (ref 11.5–14.5)
GFR SERPL CREATININE-BSD FRML MDRD: 44 ML/MIN/1.73M*2
GLUCOSE SERPL-MCNC: 89 MG/DL (ref 74–99)
HCT VFR BLD AUTO: 42.2 % (ref 36–46)
HGB BLD-MCNC: 13.7 G/DL (ref 12–16)
IMM GRANULOCYTES # BLD AUTO: 0.01 X10*3/UL (ref 0–0.7)
IMM GRANULOCYTES NFR BLD AUTO: 0.2 % (ref 0–0.9)
LYMPHOCYTES # BLD AUTO: 1.38 X10*3/UL (ref 1.2–4.8)
LYMPHOCYTES NFR BLD AUTO: 23.2 %
MCH RBC QN AUTO: 27.9 PG (ref 26–34)
MCHC RBC AUTO-ENTMCNC: 32.5 G/DL (ref 32–36)
MCV RBC AUTO: 86 FL (ref 80–100)
MONOCYTES # BLD AUTO: 0.43 X10*3/UL (ref 0.1–1)
MONOCYTES NFR BLD AUTO: 7.2 %
NEUTROPHILS # BLD AUTO: 3.95 X10*3/UL (ref 1.2–7.7)
NEUTROPHILS NFR BLD AUTO: 66.3 %
NRBC BLD-RTO: NORMAL /100{WBCS}
PLATELET # BLD AUTO: 281 X10*3/UL (ref 150–450)
POTASSIUM SERPL-SCNC: 4.7 MMOL/L (ref 3.5–5.3)
PROT SERPL-MCNC: 6.5 G/DL (ref 6.4–8.2)
RBC # BLD AUTO: 4.91 X10*6/UL (ref 4–5.2)
SODIUM SERPL-SCNC: 142 MMOL/L (ref 136–145)
WBC # BLD AUTO: 6 X10*3/UL (ref 4.4–11.3)

## 2025-03-03 PROCEDURE — 82565 ASSAY OF CREATININE: CPT | Performed by: INTERNAL MEDICINE

## 2025-03-03 PROCEDURE — 85025 COMPLETE CBC W/AUTO DIFF WBC: CPT

## 2025-03-03 PROCEDURE — 74177 CT ABD & PELVIS W/CONTRAST: CPT | Performed by: RADIOLOGY

## 2025-03-03 PROCEDURE — 82565 ASSAY OF CREATININE: CPT

## 2025-03-03 PROCEDURE — 36415 COLL VENOUS BLD VENIPUNCTURE: CPT

## 2025-03-03 PROCEDURE — 74177 CT ABD & PELVIS W/CONTRAST: CPT

## 2025-03-03 PROCEDURE — 2550000001 HC RX 255 CONTRASTS: Performed by: INTERNAL MEDICINE

## 2025-03-03 RX ADMIN — IOHEXOL 75 ML: 350 INJECTION, SOLUTION INTRAVENOUS at 10:35

## 2025-03-03 NOTE — PROGRESS NOTES
New lab orders needed for imaging scan today. Lab orders pended to provider and requested signature via secure chat.

## 2025-03-04 ENCOUNTER — TELEPHONE (OUTPATIENT)
Dept: HEMATOLOGY/ONCOLOGY | Facility: HOSPITAL | Age: 70
End: 2025-03-04
Payer: MEDICARE

## 2025-03-05 ENCOUNTER — APPOINTMENT (OUTPATIENT)
Dept: PHYSICAL THERAPY | Facility: CLINIC | Age: 70
End: 2025-03-05
Payer: MEDICARE

## 2025-03-05 NOTE — TELEPHONE ENCOUNTER
Spoke with patient who stated she wanted to go over her biopsy results with Dr. Sofia, was advised by Dr. Mann who ordered the biopsy to check with Dr. Sofia regarding follow up.  Okayed scheduling follow up with Dr. Sofia team, appointment made for 3/18 at [facility].

## 2025-03-06 ENCOUNTER — APPOINTMENT (OUTPATIENT)
Dept: RADIOLOGY | Facility: CLINIC | Age: 70
End: 2025-03-06
Payer: MEDICARE

## 2025-03-06 ENCOUNTER — APPOINTMENT (OUTPATIENT)
Dept: ENDOCRINOLOGY | Facility: CLINIC | Age: 70
End: 2025-03-06
Payer: MEDICARE

## 2025-03-06 LAB
C PEPTIDE SERPL-MCNC: 2.91 NG/ML (ref 0.8–3.85)
INSULIN SERPL-ACNC: 7 UIU/ML
PROINSULIN SERPL-SCNC: 4.8 PMOL/L

## 2025-03-12 ENCOUNTER — APPOINTMENT (OUTPATIENT)
Dept: PHYSICAL THERAPY | Facility: CLINIC | Age: 70
End: 2025-03-12
Payer: MEDICARE

## 2025-03-15 ASSESSMENT — EXTERNAL EXAM - RIGHT EYE: OD_EXAM: NORMAL

## 2025-03-15 ASSESSMENT — CUP TO DISC RATIO
OD_RATIO: .1
OS_RATIO: .1

## 2025-03-15 ASSESSMENT — SLIT LAMP EXAM - LIDS
COMMENTS: GOOD POSITION
COMMENTS: GOOD POSITION

## 2025-03-15 ASSESSMENT — EXTERNAL EXAM - LEFT EYE: OS_EXAM: NORMAL

## 2025-03-16 NOTE — PROGRESS NOTES
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 No edema OU. Small to medium drusen OU. Stable from 4/16/24, possible worsening OS compared to 9/16/22.   -In 2021 diagnosed with gastritis with late stage dumping and also gastroparesis. Now diagnosis may be delayed gastric emptying. Patient will consult with her medical team to see if can take lutein supplement. Cannot eat raw vegetables. Advised to wear UV blocking sunglasses.   -F/u 6 months for cataract evaluation -- recheck refraction, glare/BAT, dilation/OCT macula. Plan for Lenstar/Pentacam only if cataract(s) visually significant and if patient would like to proceed with cataract surgery.    Combined form of age-related cataract, right eyeH25.811  Combined form of age-related cataract, left eyeH25.812  -Patient is noticing worsening of vision. Symptoms: Gradual worsening over the past several months. Having more trouble seeing road signs when driving. Glare from bright sun. Avoids driving at night when possible.    -Lenstar done at previous visit - will need to repeat when ready for cataract surgery.   -Dominance: Right  -F/u 6 months for cataract evaluation -- recheck refraction, glare/BAT, dilation/OCT macula. Plan for Lenstar/Pentacam only if cataract(s) visually significant and if patient would like to proceed with cataract surgery.    Bilateral dry eyesH04.123  -Warm compresses and artificial tears PRN    Hypertensive retinopathy of both eyesH35.033  Retinal hemorrhage, left eyeH35.62   -History of retinal hemorrhages OS in 2016 - resolved with no recurrence since then.   -No history of diabetes or high cholesterol. Has HTN - BP has been elevated lately. Continue close monitoring of blood pressure.   -Had CTA neck and ECHO in June 2018 - WNL.  -Seen by Dr. Garces August 2018. Monitor.     Nevus of iris of right  "eyeD31.41  Choroidal nevus of left eyeD31.32  -Optos (4/1/25) - OD: Clear media, pink and sharp disc, macula flat/drusen, vessels WNL, periphery flat. OS: Clear mediate, pink and sharp disc, macula flat/drusen, vessels WNL, periphery flat. 1DD choroidal nevus temporal disc margin - flat, no SRF, no OP, no drusen.   -Small risk of melanoma with these type of lesions. Plan for dilated exam at least annually to monitor.     H52.10 Myopia with presbyopia  -Rx given (3/5/19). Did not fill, but happy with OTC reading glasses at this time (+1.25)  -Refraction performed today for diagnostic purposes only and without specific intent to dispense Rx. Glasses Rx not dispensed today.       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 Glucose levels had remained labile, fluctuating from 200's to 50 in a span of 40 minutes.         PRIOR OCULAR HISTORY:    Periorbital edema of right eyeR60.0  -Had 3 episodes of right periorbital edema preceded by irritation/FBS in 2018. All episodes resolved within about 24 hours. Possibly related to bacterial overgrowth in bowel, was hospitalized and treated and symptoms have resolved. No periorbital edema since 2018.     H/O fiolcznaS88.69  -Happened a couple of years ago. Per patient, does not think it was truly diplopia.  Patient feels this was associated with an episode of intraocular bleeding in 2016.  No episodes since last visit. Monitor.    [id number]  -Headaches in 2020 - about 3 times a week. Symptoms have decreased over time, but had episode last week of \"film\" over eyes lasting about 36 hours, then resolved. Also concurrently had numbness/tingling of 4th/5th digits of left hand - took 62 hours to resolve. Last MRI brain w/wo contrast was 2016 and was found to have left parietal arachnoid cyst. Also FH of aneurysm - will discuss further management with imaging and/or anticoagulation with PMD. Per patient she bruises easily though not on anticoagulant.  "

## 2025-03-18 ENCOUNTER — OFFICE VISIT (OUTPATIENT)
Dept: HEMATOLOGY/ONCOLOGY | Facility: CLINIC | Age: 70
End: 2025-03-18
Payer: MEDICARE

## 2025-03-18 VITALS
BODY MASS INDEX: 37.03 KG/M2 | SYSTOLIC BLOOD PRESSURE: 148 MMHG | WEIGHT: 191.2 LBS | HEART RATE: 75 BPM | DIASTOLIC BLOOD PRESSURE: 90 MMHG | TEMPERATURE: 97.3 F | OXYGEN SATURATION: 98 % | RESPIRATION RATE: 16 BRPM

## 2025-03-18 DIAGNOSIS — R73.9 ELEVATED BLOOD SUGAR: Primary | ICD-10-CM

## 2025-03-18 PROCEDURE — 99215 OFFICE O/P EST HI 40 MIN: CPT | Performed by: INTERNAL MEDICINE

## 2025-03-18 PROCEDURE — 3077F SYST BP >= 140 MM HG: CPT | Performed by: INTERNAL MEDICINE

## 2025-03-18 PROCEDURE — 1125F AMNT PAIN NOTED PAIN PRSNT: CPT | Performed by: INTERNAL MEDICINE

## 2025-03-18 PROCEDURE — 1157F ADVNC CARE PLAN IN RCRD: CPT | Performed by: INTERNAL MEDICINE

## 2025-03-18 PROCEDURE — 1036F TOBACCO NON-USER: CPT | Performed by: INTERNAL MEDICINE

## 2025-03-18 PROCEDURE — 1159F MED LIST DOCD IN RCRD: CPT | Performed by: INTERNAL MEDICINE

## 2025-03-18 PROCEDURE — 3080F DIAST BP >= 90 MM HG: CPT | Performed by: INTERNAL MEDICINE

## 2025-03-18 ASSESSMENT — PAIN SCALES - GENERAL: PAINLEVEL_OUTOF10: 5

## 2025-03-18 NOTE — PROGRESS NOTES
".Patient ID: Marissa Mosquera \"Harman or Ms Mosquera\" is a 70 y.o. female.  Referring Physician: No referring provider defined for this encounter.  Primary Care Provider: Brittany Behm, DO      Chief complaint : Duodenal NETs    HPI  70 yo woman from [place] referred for duod net.  Has a complex history summarized well below:     Complex history including a vertical banded gastroplasty in 2000 as well as a hiatal hernia repair and Slava gastroplasty performed.  This was complicated by gastric dysmotility and functional dysphagia requiring PEG tube placement from 3040-2560.    She underwent surgery in November 2017 with lysis of adhesions and reversal of her vertical band gastroplasty.    Patient's last endoscopy was in March 2023 and was notable for retained food.  She also has had a gastric emptying scan consistent with gastroparesis.  She is also been treated for SIBO multiple times with rifaximin.  She also has been diagnosed with dumping syndrome.  She was found to have an elevated chromogranin a level followed by a PET-DOTATATE scan which did reveal uptake in the first portion of the duodenal wall consistent with a possible neuroendocrine tumor and no evidence of metastatic disease.  10/2024: EUS: 10 mm x 6 mm nodule, originating in the submucosa was visualized in the duodenal bulb.  Path: Well diff. G2 NET of the duodenum.  Of note: She said she had for many years feeling palpitations, diaphoretic andsweating after she eats due to low BG.  Labs showed initially high insulin but repeat showed normal insulin, proinsulin and C peptide  11/06/2024: Pt had EMR of the duodenal polyp for complete resection    Today: She is feeling tired, lethargy, generalized muscle pain and weakness.     SYSTEMIC TREATMENT RECEIVED       Subjective   Please refer to Notes above for complete History of present illness.          Review of Systems:   All 14 systems have been reviewed and were negative except for the HPI.     "   MEDICAL HISTORY  Past Medical History:   Diagnosis Date    Anesthesia of skin 04/20/2015    Finger numbness    Arthritis 2008    Cataract 2018    Chronic kidney disease 2023    Conjunctival cysts, left eye 08/14/2018    Conjunctival cyst of left eye    Disease of thyroid gland 1985    GERD (gastroesophageal reflux disease) 1976    Hypertension 2015    Hypertensive retinopathy, bilateral 08/20/2019    Hypertensive retinopathy of both eyes    Other conditions influencing health status 02/15/2017    Compression fracture    Peptic ulceration 2023    Personal history of diseases of the skin and subcutaneous tissue 11/01/2018    History of urticaria    Personal history of other diseases of the circulatory system 11/01/2018    History of hypertension    Personal history of other specified conditions 10/31/2018    History of diarrhea    Personal history of other specified conditions 07/27/2015    History of vertigo    PONV (postoperative nausea and vomiting)     Retinal hemorrhage, left eye 02/13/2018    Retinal hemorrhage of left eye    Varicella 1957    Visual impairment 1963       FAMILY HISTORY  Family History   Problem Relation Name Age of Onset    Heart failure Mother Elisabet     Hypertension Mother Elisabet     Arthritis Mother Elisabet     Depression Mother Elisabet     Early natural death Mother Elisabet     Stroke Mother Elisabet     Heart failure Father Vernon     Heart disease Father Vernon     Other (Sinus problem) Brother      Cancer Other Grandfather     Cancer [facility]     Early natural death Maternal Grand[facility]     Stroke Maternal Grandfather [facility]     Alcohol abuse Brother Mario     Diabetes Brother Mario     Hypertension Brother Mario     Alcohol abuse Brother Junior     Drug abuse Brother Junior     Stroke Mother's Sister Katharine        TOBACCO HISTORY  Tobacco Use: Low Risk  (2/7/2025)    Received from [facility]    Patient History     Smoking Tobacco Use: Never     Smokeless Tobacco Use:  Never     Passive Exposure: Not on file       SOCIAL HISTORY  Social Connections: Not on file        Outpatient Medication Profile:  Current Outpatient Medications on File Prior to Visit   Medication Sig Dispense Refill    amLODIPine (Norvasc) 10 mg tablet Take 1 tablet (10 mg) by mouth once daily. 90 tablet 3    atorvastatin (Lipitor) 10 mg tablet TAKE 1 TABLET BY MOUTH EVERY DAY 90 tablet 3    blood-glucose sensor (FreeStyle Sophy 3 Plus Sensor) device Change sensor every 15 days 1 each 11    calcium carbonate (Tums) 200 mg calcium chewable tablet Chew 1 tablet (500 mg) 4 times a day as needed for indigestion or heartburn.      diclofenac sodium (Voltaren Arthritis Pain) 1 % gel Apply 4.5 inches (4 g) topically 4 times a day as needed (pain). 200 g 2    esomeprazole (NexIUM) 40 mg DR capsule Take 1 capsule (40 mg) by mouth once daily in the morning. Take before meals. Do not open capsule. 30 capsule 2    FreeStyle Sophy 3 Sensor device Change sensor every 2 weeks 1 each 5    levothyroxine (Synthroid, Levoxyl) 75 mcg tablet TAKE 1 TABLET BY MOUTH EVERY DAY 90 tablet 3    metFORMIN  mg 24 hr tablet Take 1 tablet (500 mg) by mouth once daily. Do not crush, chew, or split. 120 tablet 5    ondansetron (Zofran) 4 mg tablet Take 2 tablets (8 mg) by mouth 2 times a day as needed for nausea or vomiting. 20 tablet 3     Current Facility-Administered Medications on File Prior to Visit   Medication Dose Route Frequency Provider Last Rate Last Admin    perflutren lipid microspheres (Definity) injection 0.5-10 mL of dilution  0.5-10 mL of dilution intravenous Once in imaging Jose BRIGGS MD             Performance Status:  Symptomatic; fully ambulatory     Vitals and Measurements:   There were no vitals taken for this visit.      Physical Exam:   General: Appears well and in NAD  Eyes: PERRL, EOMI, clear sclera  ENMT: mucous membranes moist, no apparent injury, no lesions seen  Head/Neck: Neck supple, no apparent  injury, thyroid without mass or tenderness, No JVD, trachea midline,   Thorax: Patent airways, CTAB, normal breath sounds with good chest expansion, thorax symmetric  Cardiovascular: Regular, rate and rhythm, no murmurs, 2+ equal pulses of the extremities, normal S 1and S 2  Gastrointestinal: Nondistended, soft, non-tender, no rebound tenderness or guarding, no masses palpable, no organomegaly, +BS  Musculoskeletal: ROM intact, no joint swelling, normal strength  Extremities: normal extremities, no cyanosis edema, contusions or wounds, no clubbing  Neurological: alert and oriented x3, intact senses, motor, response and reflexes, normal strength  Lymphatic: No significant lymphadenopathy  Psychological: Appropriate mood and behavior  Skin: Warm and dry, no lesions, no rashes         Lab Results:  I have reviewed these laboratory results:     Lab Results   Component Value Date    WBC 6.0 03/03/2025    HGB 13.7 03/03/2025    HCT 42.2 03/03/2025    MCV 86 03/03/2025     03/03/2025         Chemistry    Lab Results   Component Value Date/Time     03/03/2025 1005    K 4.7 03/03/2025 1005     03/03/2025 1005    CO2 27 03/03/2025 1005    BUN 18 03/03/2025 1005    CREATININE 1.29 (H) 03/03/2025 1005    Lab Results   Component Value Date/Time    CALCIUM 9.3 03/03/2025 1005    ALKPHOS 97 03/03/2025 1005    AST 15 03/03/2025 1005    ALT 11 03/03/2025 1005    BILITOT 0.5 03/03/2025 1005            Lab Results   Component Value Date    TSH 2.45 11/20/2024        Radiology Result:  I have reviewed the latest Imaging in PACS and the findings are noted in this note. I discussed the results of the latest imaging with the patient. All previous imaging were reviewed at the time it was completed. Full records are available in the EMR for review as well.     === 10/29/24 ===    CT ABDOMEN PELVIS W IV CONTRAST    - Impression -  Postsurgical changes of the stomach. No evidence of bowel obstruction  or free fluid. Mild  stranding in the mesentery similar to 09/04/2024.    Small hiatal and ventral hernias.    Hypodensities arising from both kidneys, probable cysts although not  fully characterized on single phase exam.    Additional findings as described above.    MACRO:  None.    Signed by: Mone Platt 10/29/2024 3:31 PM  Dictation workstation:   [id number]    === 04/13/24 ===    MR ANKLE LEFT WO CONTRAST    - Impression -  1. Mild sprain of the deep deltoid ligament complex which may be  acute on chronic with acute component with reactive marrow change at  the tibial and talar attachments. Subtle associated avulsion  fracturing particularly from the tibial attachment is not entirely  excluded.  2. Patchy reactive marrow change and multiple bones in the midfoot  favored to be secondary to degenerative change at multiple joints in  the midfoot, severe at the 2nd tarsometatarsal articulation, although  a component of contusion versus stress reaction is not entirely  excluded with the bone of possible greatest concern for  contusion/stress reaction being the navicular bone. No  macrotrabecular fracture line is evident.  3. Findings most compatible with a degree of chronic sprains of the  anterior inferior tibiofibular ligament, anterior talofibular  ligament, and calcaneofibular ligament with sequelae of prior  avulsion injuries at the anterior inferior tibiofibular and anterior  talofibular ligaments.  4. Mild tibialis posterior tendon sheath tenosynovitis.  5. Edema in the subcutaneous tissues which may be related to  lymphedema versus contusion. This is greatest over the medial  malleolus.  6. Other findings discussed above.    Signed by: Jesus Hernandez 4/13/2024 11:27 AM  Dictation workstation:   [id number]           Pathology Results:  I have reviewed the full pathology report recorded in the EMR. The pertinent portions indicating diagnosis are listed here in the note. for details please refer to the full report recorded in  the EMR.    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  Has a complex history summarized well below:     Complex history including a vertical banded gastroplasty in 2000 as well as a hiatal hernia repair and Slava gastroplasty performed.  This was complicated by gastric dysmotility and functional dysphagia requiring PEG tube placement from 9407-6166.    She underwent surgery in November 2017 with lysis of adhesions and reversal of her vertical band gastroplasty.    Patient's last endoscopy was in March 2023 and was notable for retained food.  She also has had a gastric emptying scan consistent with gastroparesis.  She is also been treated for SIBO multiple times with rifaximin.  She also has been diagnosed with dumping syndrome.  She was found to have an elevated chromogranin a level followed by a PET-DOTATATE scan which did reveal uptake in the first portion of the duodenal wall consistent with a possible neuroendocrine tumor and no evidence of metastatic disease.  10/2024: EUS: 10 mm x 6 mm nodule, originating in the submucosa was visualized in the duodenal bulb. Did not involve the muscularis propria.   Path: Well diff. G2 NET of the duodenum.  Of note: She said she had for many years feeling palpitations, diaphoretic andsweating after she eats due to low BG.  Labs showed initially high insulin but repeat showed normal insulin, proinsulin and C peptide  11/06/2024: Pt had EMR of the duodenal polyp for complete resection    I ordered serum glucagon given high BG in her lexie free style device which came normal   Gastrin was high due to PPI  02/18/2025: Restaging PET-Ga68: No PET evidence of residual disease   03/03/2025: Restaging CT scan: No new evidence to suggest metastatic disease throughout the abdomen and pelvis  02/07/2025: EGD didn't show evidence of NET  Today: She is feeling tired, lethargy, generalized muscle pain and  weakness.    Plan:  - Periodic endoscopic surveillance in 6-12 months: Due for one   - CT AP every 2 years for follow up given that the lesion didn't invade the ms  - Will send to endocrinology for her uncontrolled BG levels      DISCLAIMER:   In preparing for this visit and writing this note, I reviewed all the previous electronic medical records (labs, imaging and medical charts) of the patient available in the physician portal. Significant findings which helped in decision making are recorded  in this chart.     The plan was discussed with the patient. We gave him ample opportunities to ask questions. All questions were answered to his satisfaction and he verbalized understanding.       Catalina Sofia M.D.   and Director of the Neuroendocrine Tumor Program  Gastrointestinal Medical Oncology  Department of Hematology and Oncology  [facility], [place] [address]  Phone (Office): [phone]  Fax:  [phone]   Email: [email or web address]  Learn more about [facility] Comprehensive Neuroendocrine Tumor Program: Click Here  Learn more about [place] [employer]: [email or web address]

## 2025-03-19 ENCOUNTER — APPOINTMENT (OUTPATIENT)
Dept: PHYSICAL THERAPY | Facility: CLINIC | Age: 70
End: 2025-03-19
Payer: MEDICARE

## 2025-03-25 ENCOUNTER — APPOINTMENT (OUTPATIENT)
Dept: ENDOCRINOLOGY | Facility: CLINIC | Age: 70
End: 2025-03-25
Payer: MEDICARE

## 2025-04-01 ENCOUNTER — APPOINTMENT (OUTPATIENT)
Dept: ORTHOPEDIC SURGERY | Facility: HOSPITAL | Age: 70
End: 2025-04-01
Payer: MEDICARE

## 2025-04-01 ENCOUNTER — APPOINTMENT (OUTPATIENT)
Dept: OPHTHALMOLOGY | Facility: CLINIC | Age: 70
End: 2025-04-01
Payer: MEDICARE

## 2025-04-01 DIAGNOSIS — H25.812 COMBINED FORM OF AGE-RELATED CATARACT, LEFT EYE: ICD-10-CM

## 2025-04-01 DIAGNOSIS — H35.033 HYPERTENSIVE RETINOPATHY OF BOTH EYES: ICD-10-CM

## 2025-04-01 DIAGNOSIS — H52.4 PRESBYOPIA: ICD-10-CM

## 2025-04-01 DIAGNOSIS — H52.12 MYOPIA OF LEFT EYE: ICD-10-CM

## 2025-04-01 DIAGNOSIS — H25.811 COMBINED FORM OF AGE-RELATED CATARACT, RIGHT EYE: ICD-10-CM

## 2025-04-01 DIAGNOSIS — D31.41 NEVUS OF IRIS OF RIGHT EYE: ICD-10-CM

## 2025-04-01 DIAGNOSIS — H04.123 DRY EYES, BILATERAL: ICD-10-CM

## 2025-04-01 DIAGNOSIS — D31.32 CHOROIDAL NEVUS OF LEFT EYE: ICD-10-CM

## 2025-04-01 DIAGNOSIS — H35.3131 EARLY DRY STAGE NONEXUDATIVE AGE-RELATED MACULAR DEGENERATION OF BOTH EYES: Primary | ICD-10-CM

## 2025-04-01 PROCEDURE — 92134 CPTRZ OPH DX IMG PST SGM RTA: CPT | Performed by: OPHTHALMOLOGY

## 2025-04-01 PROCEDURE — 92014 COMPRE OPH EXAM EST PT 1/>: CPT | Performed by: OPHTHALMOLOGY

## 2025-04-01 ASSESSMENT — TONOMETRY
OS_IOP_MMHG: 14
OD_IOP_MMHG: 14
IOP_METHOD: GOLDMANN APPLANATION

## 2025-04-01 ASSESSMENT — REFRACTION_MANIFEST
OD_ADD: +2.50
OS_ADD: +2.50
OD_CYLINDER: SPHERE
OS_CYLINDER: -0.50
OS_AXIS: 180
OS_SPHERE: -0.75
OD_SPHERE: -0.25

## 2025-04-01 ASSESSMENT — VISUAL ACUITY
OS_SC: 20/70+
OD_SC: 20/25
OD_BAT_MED: 20/30
OS_BAT_MED: 20/25
METHOD: SNELLEN - LINEAR

## 2025-04-01 ASSESSMENT — ENCOUNTER SYMPTOMS
ALLERGIC/IMMUNOLOGIC NEGATIVE: 0
MUSCULOSKELETAL NEGATIVE: 0
NEUROLOGICAL NEGATIVE: 0
RESPIRATORY NEGATIVE: 0
GASTROINTESTINAL NEGATIVE: 0
CARDIOVASCULAR NEGATIVE: 0
PSYCHIATRIC NEGATIVE: 0
ENDOCRINE NEGATIVE: 0
EYES NEGATIVE: 1
CONSTITUTIONAL NEGATIVE: 0
HEMATOLOGIC/LYMPHATIC NEGATIVE: 0

## 2025-04-01 ASSESSMENT — REFRACTION_WEARINGRX: SPECS_TYPE: OTC

## 2025-04-03 ENCOUNTER — APPOINTMENT (OUTPATIENT)
Dept: PRIMARY CARE | Facility: CLINIC | Age: 70
End: 2025-04-03
Payer: MEDICARE

## 2025-04-03 VITALS
WEIGHT: 192 LBS | BODY MASS INDEX: 37.69 KG/M2 | HEIGHT: 60 IN | OXYGEN SATURATION: 98 % | HEART RATE: 80 BPM | DIASTOLIC BLOOD PRESSURE: 82 MMHG | RESPIRATION RATE: 16 BRPM | SYSTOLIC BLOOD PRESSURE: 144 MMHG

## 2025-04-03 DIAGNOSIS — R68.89 ABNORMAL ENDOCRINE LABORATORY TEST FINDING: ICD-10-CM

## 2025-04-03 DIAGNOSIS — N18.32 STAGE 3B CHRONIC KIDNEY DISEASE (MULTI): ICD-10-CM

## 2025-04-03 DIAGNOSIS — K31.84 GASTROPARESIS: ICD-10-CM

## 2025-04-03 DIAGNOSIS — E16.2 HYPOGLYCEMIA: ICD-10-CM

## 2025-04-03 DIAGNOSIS — J20.8 VIRAL BRONCHITIS: ICD-10-CM

## 2025-04-03 DIAGNOSIS — Z00.00 ENCOUNTER FOR ANNUAL WELLNESS EXAM IN MEDICARE PATIENT: Primary | ICD-10-CM

## 2025-04-03 DIAGNOSIS — Z12.31 ENCOUNTER FOR SCREENING MAMMOGRAM FOR BREAST CANCER: ICD-10-CM

## 2025-04-03 PROCEDURE — 1158F ADVNC CARE PLAN TLK DOCD: CPT | Performed by: FAMILY MEDICINE

## 2025-04-03 PROCEDURE — 3079F DIAST BP 80-89 MM HG: CPT | Performed by: FAMILY MEDICINE

## 2025-04-03 PROCEDURE — 1170F FXNL STATUS ASSESSED: CPT | Performed by: FAMILY MEDICINE

## 2025-04-03 PROCEDURE — 1123F ACP DISCUSS/DSCN MKR DOCD: CPT | Performed by: FAMILY MEDICINE

## 2025-04-03 PROCEDURE — G0439 PPPS, SUBSEQ VISIT: HCPCS | Performed by: FAMILY MEDICINE

## 2025-04-03 PROCEDURE — 1036F TOBACCO NON-USER: CPT | Performed by: FAMILY MEDICINE

## 2025-04-03 PROCEDURE — 1157F ADVNC CARE PLAN IN RCRD: CPT | Performed by: FAMILY MEDICINE

## 2025-04-03 PROCEDURE — 3008F BODY MASS INDEX DOCD: CPT | Performed by: FAMILY MEDICINE

## 2025-04-03 PROCEDURE — 1159F MED LIST DOCD IN RCRD: CPT | Performed by: FAMILY MEDICINE

## 2025-04-03 PROCEDURE — 3077F SYST BP >= 140 MM HG: CPT | Performed by: FAMILY MEDICINE

## 2025-04-03 ASSESSMENT — PATIENT HEALTH QUESTIONNAIRE - PHQ9
SUM OF ALL RESPONSES TO PHQ9 QUESTIONS 1 AND 2: 2
4. FEELING TIRED OR HAVING LITTLE ENERGY: SEVERAL DAYS
9. THOUGHTS THAT YOU WOULD BE BETTER OFF DEAD, OR OF HURTING YOURSELF: NOT AT ALL
10. IF YOU CHECKED OFF ANY PROBLEMS, HOW DIFFICULT HAVE THESE PROBLEMS MADE IT FOR YOU TO DO YOUR WORK, TAKE CARE OF THINGS AT HOME, OR GET ALONG WITH OTHER PEOPLE: SOMEWHAT DIFFICULT
SUM OF ALL RESPONSES TO PHQ QUESTIONS 1-9: 7
2. FEELING DOWN, DEPRESSED OR HOPELESS: SEVERAL DAYS
8. MOVING OR SPEAKING SO SLOWLY THAT OTHER PEOPLE COULD HAVE NOTICED. OR THE OPPOSITE, BEING SO FIGETY OR RESTLESS THAT YOU HAVE BEEN MOVING AROUND A LOT MORE THAN USUAL: NOT AT ALL
3. TROUBLE FALLING OR STAYING ASLEEP OR SLEEPING TOO MUCH: SEVERAL DAYS
5. POOR APPETITE OR OVEREATING: NEARLY EVERY DAY
6. FEELING BAD ABOUT YOURSELF - OR THAT YOU ARE A FAILURE OR HAVE LET YOURSELF OR YOUR FAMILY DOWN: NOT AT ALL
7. TROUBLE CONCENTRATING ON THINGS, SUCH AS READING THE NEWSPAPER OR WATCHING TELEVISION: NOT AT ALL
1. LITTLE INTEREST OR PLEASURE IN DOING THINGS: SEVERAL DAYS

## 2025-04-03 ASSESSMENT — ACTIVITIES OF DAILY LIVING (ADL)
GROCERY_SHOPPING: INDEPENDENT
MANAGING_FINANCES: INDEPENDENT
DOING_HOUSEWORK: INDEPENDENT
DRESSING: INDEPENDENT
BATHING: INDEPENDENT
TAKING_MEDICATION: INDEPENDENT

## 2025-04-03 ASSESSMENT — ENCOUNTER SYMPTOMS
OCCASIONAL FEELINGS OF UNSTEADINESS: 1
LOSS OF SENSATION IN FEET: 0
DEPRESSION: 0

## 2025-04-03 NOTE — PROGRESS NOTES
"Subjective   Marissa Mosquera \"Harman or Ms Mosquera\" is a 70 y.o. female who presents for Medicare Annual Wellness Visit Subsequent, Hyperglycemia (discussion), and Request For Order(s) (Mammogram request ).  HPI  \"BUNNY\"  PMH:  2003 GI resection Sx  2016 reversal Sx   gastric bypass   spontaneous Fx C3-4, L1 despite nml xray DEXA-few courses prolia   SIBO SEVERE, hosp   b/l shoulder replacement  parathyroidiectomy   shingles   HTN   HLD  hypothyroidism   ltd diet d/t MAJOR teeth issues  gastritis EGD 6/2022  echo pEF 9/2022  CKD stage 3, nephro   Ur Albumin q 6 mo   Gastroparesis, GES 3/2023   Nml c-scope 6/2015 rpt 10 yr   DEXA nml 7/2022  Ca score 0. 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 Dr Garcia endoscopist then GI in [facility]   NE onc Dr Sofia  Hypogly-CGM     Here for AWV     Major issue is hypo/hypergly. Not wearing CGM bc too many alerts  Thought possible cause was NE tumor but this has been removed.   Was RF to endo but appt not until Aug   Believes this due to gastroparesis   GES + 2023.   [facility] reglan     Saw Dr Shirley for L hip pain. PT helped    GI, Marlee Angela, moved. Does not have GI    Metformin caused GI SE.       Current Outpatient Medications on File Prior to Visit   Medication Sig Dispense Refill    amLODIPine (Norvasc) 10 mg tablet Take 1 tablet (10 mg) by mouth once daily. 90 tablet 3    atorvastatin (Lipitor) 10 mg tablet TAKE 1 TABLET BY MOUTH EVERY DAY 90 tablet 3    blood-glucose sensor (FreeStyle Sophy 3 Plus Sensor) device Change sensor every 15 days 1 each 11    diclofenac sodium (Voltaren Arthritis Pain) 1 % gel Apply 4.5 inches (4 g) topically 4 times a day as needed (pain). 200 g 2    esomeprazole (NexIUM) 40 mg DR capsule Take 1 capsule (40 mg) by mouth once daily in the morning. Take before meals. Do not open capsule. 30 capsule 2    levothyroxine (Synthroid, Levoxyl) 75 mcg tablet TAKE 1 TABLET BY MOUTH EVERY DAY 90 tablet 3    ondansetron " "(Zofran) 4 mg tablet Take 2 tablets (8 mg) by mouth 2 times a day as needed for nausea or vomiting. 20 tablet 3    calcium carbonate (Tums) 200 mg calcium chewable tablet Chew 1 tablet (500 mg) 4 times a day as needed for indigestion or heartburn. (Patient not taking: Reported on 4/3/2025)      [DISCONTINUED] FreeStyle Sophy 3 Sensor device Change sensor every 2 weeks 1 each 5    [DISCONTINUED] metFORMIN  mg 24 hr tablet Take 1 tablet (500 mg) by mouth once daily. Do not crush, chew, or split. 120 tablet 5     Current Facility-Administered Medications on File Prior to Visit   Medication Dose Route Frequency Provider Last Rate Last Admin    perflutren lipid microspheres (Definity) injection 0.5-10 mL of dilution  0.5-10 mL of dilution intravenous Once in imaging Jose BRIGGS MD            Patient Health Questionnaire-9 Score: 7           Objective   /82   Pulse 80   Resp 16   Ht 1.53 m (5' 0.25\")   Wt 87.1 kg (192 lb)   SpO2 98%   BMI 37.19 kg/m²    Physical Exam  General: NAD  HEENT:NCAT, PERRLA, nml OP, b/l TM clear   Neck: no cervical KALPANA  Heart: RRR no murmur, no edema   Lungs: CTA b/l, no wheeze or rhonchi, coarse breath sounds   GI: abd soft, nontender, nondistended.   MSK: no c/c/e. nml gait   Skin: warm and dry  Psych: cooperative, appropriate affect  Neuro: speech clear. A&Ox3  Declines memory test  Assessment/Plan   Problem List Items Addressed This Visit       Stage 3b chronic kidney disease (Multi)  Stable   Important to keep glu/BP under control       Gastroparesis  RF to [facility] gastroparesis clinic   Complex GI case       Relevant Orders    Referral to Gastroenterology     Other Visit Diagnoses       Encounter for annual wellness exam in Medicare patient    -  Primary  overall doing well. Cont balanced diet/ex as charisse   BMI: 37  VACC: reviewed shingrix/RSV/PNA UTD   COLON CA SCRN: due soon, fiona w GI   LABS: UTD   PAP: n/a  MAMMO: ordered  DEXA: UTD       Encounter for screening " mammogram for breast cancer        Relevant Orders    BI mammo bilateral screening tomosynthesis                Viral bronchitis      Will give more time, if no improvement or worsens call for abx       Elevated BP: rec to check at home and f/up w cards  D/t CKD goal <130/80s     Hypogly: complex cause w recent NE tumor but still w hypogly. Ltd diet as she has a hard time w protein   F/up w endo as fiona     F/up 3 mo or prn

## 2025-04-11 ENCOUNTER — APPOINTMENT (OUTPATIENT)
Dept: CARDIOLOGY | Facility: CLINIC | Age: 70
End: 2025-04-11
Payer: MEDICARE

## 2025-04-11 VITALS
BODY MASS INDEX: 38.48 KG/M2 | DIASTOLIC BLOOD PRESSURE: 70 MMHG | HEIGHT: 60 IN | WEIGHT: 196 LBS | SYSTOLIC BLOOD PRESSURE: 124 MMHG | HEART RATE: 72 BPM | OXYGEN SATURATION: 98 %

## 2025-04-11 DIAGNOSIS — E78.5 DYSLIPIDEMIA: ICD-10-CM

## 2025-04-11 DIAGNOSIS — I10 PRIMARY HYPERTENSION: ICD-10-CM

## 2025-04-11 DIAGNOSIS — K31.84 GASTROPARESIS: ICD-10-CM

## 2025-04-11 DIAGNOSIS — E78.00 PURE HYPERCHOLESTEROLEMIA: ICD-10-CM

## 2025-04-11 DIAGNOSIS — R40.0 DAYTIME SOMNOLENCE: ICD-10-CM

## 2025-04-11 DIAGNOSIS — R06.09 DOE (DYSPNEA ON EXERTION): ICD-10-CM

## 2025-04-11 DIAGNOSIS — I10 BENIGN ESSENTIAL HYPERTENSION: ICD-10-CM

## 2025-04-11 DIAGNOSIS — I50.33 ACUTE ON CHRONIC DIASTOLIC HEART FAILURE: Primary | ICD-10-CM

## 2025-04-11 DIAGNOSIS — R00.2 PALPITATIONS: ICD-10-CM

## 2025-04-11 PROCEDURE — 1157F ADVNC CARE PLAN IN RCRD: CPT | Performed by: STUDENT IN AN ORGANIZED HEALTH CARE EDUCATION/TRAINING PROGRAM

## 2025-04-11 PROCEDURE — G2211 COMPLEX E/M VISIT ADD ON: HCPCS | Performed by: STUDENT IN AN ORGANIZED HEALTH CARE EDUCATION/TRAINING PROGRAM

## 2025-04-11 PROCEDURE — 1036F TOBACCO NON-USER: CPT | Performed by: STUDENT IN AN ORGANIZED HEALTH CARE EDUCATION/TRAINING PROGRAM

## 2025-04-11 PROCEDURE — 3074F SYST BP LT 130 MM HG: CPT | Performed by: STUDENT IN AN ORGANIZED HEALTH CARE EDUCATION/TRAINING PROGRAM

## 2025-04-11 PROCEDURE — 1159F MED LIST DOCD IN RCRD: CPT | Performed by: STUDENT IN AN ORGANIZED HEALTH CARE EDUCATION/TRAINING PROGRAM

## 2025-04-11 PROCEDURE — 3008F BODY MASS INDEX DOCD: CPT | Performed by: STUDENT IN AN ORGANIZED HEALTH CARE EDUCATION/TRAINING PROGRAM

## 2025-04-11 PROCEDURE — 3078F DIAST BP <80 MM HG: CPT | Performed by: STUDENT IN AN ORGANIZED HEALTH CARE EDUCATION/TRAINING PROGRAM

## 2025-04-11 PROCEDURE — 99215 OFFICE O/P EST HI 40 MIN: CPT | Performed by: STUDENT IN AN ORGANIZED HEALTH CARE EDUCATION/TRAINING PROGRAM

## 2025-04-11 NOTE — PATIENT INSTRUCTIONS
As we discussed, I placed a referral for a dietitian.    Please continue current cardiac medication including amlodipine 10 mg daily, atorvastatin 10 mg daily.    We discussed getting a home blood pressure cuff and checking a home blood pressure log.  Please make sure the blood pressure cuff goes on your upper arm and is the appropriate size for your upper arm.   I usually recommend checking your blood pressure for one month before coming to see me or your primary care physician about 4-5 times a week for one month.  Please take some of these readings in the morning and some in the afternoon or evening.  Please create a log of date, time, and blood pressure on a piece of paper or notebook that we can review at your next visit.     Please followup with me in Cardiology clinic within the next 1 year.  Please return to clinic sooner or seek emergent care if your symptoms reoccur or worsen.

## 2025-04-11 NOTE — PROGRESS NOTES
"Follow-up visit    HPI:    Marissa Mosquera \"Harman or Ms Mosquera\" is a 70 y.o. 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    She is doing relatively well from a cardiovascular standpoint.  Lower extremity edema has largely resolved.  No significant chest discomfort.   Dyspnea exertion is stable.  We reviewed and discussed her recent echocardiogram.  Of note her BNP level has come down to the normal range.  She was recently diagnosed with a neuroendocrine tumor and continues to have significant gastroenterology related issues.  No exacerbating or relieving factors.  Patient denies chest pain and angina.  Pt denies orthopnea, and paroxysmal nocturnal dyspnea.  Pt denies worsening lower extremity edema.  Pt denies syncope.  No recent falls.  No fever or chills.  No cough.  No change in bowel or bladder habits.  No sick contacts.  No recent travel.    12 point review of systems including (Constitutional, Eyes, ENMT, Respiratory, Cardiac, Gastrointestinal, Neurological, Psychiatric, and Hematologic) was performed and is " otherwise negative.    Past medical history reviewed:   has a past medical history of Anesthesia of skin (04/20/2015), Arthritis (2008), Cataract (2018), Chronic diarrhea (06/2014), Chronic kidney disease (2023), Conjunctival cysts, left eye (08/14/2018), Disease of thyroid gland (1985), GERD (gastroesophageal reflux disease) (1976), Hyperparathyroidism (Multi) (2013), Hypertension (2015), Hypertensive retinopathy, bilateral (08/20/2019), Hypothyroidism (1990), Other conditions influencing health status (02/15/2017), Peptic ulceration (2023), Personal history of diseases of the skin and subcutaneous tissue (11/01/2018), Personal history of other diseases of the circulatory system (11/01/2018), Personal history of other specified conditions (10/31/2018), Personal history of other specified conditions (07/27/2015), PONV (postoperative nausea and vomiting), Retinal hemorrhage, left eye (02/13/2018), 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    Past surgical history reviewed:   has a past surgical history that includes Other surgical history (07/17/2017); Other surgical history (07/17/2017); Other surgical history (07/17/2017); Other surgical history (07/17/2017); Other surgical history (12/18/2017); Other surgical history (03/05/2019); Gastric fundoplication (06/29/2017); Colonoscopy (06/08/2016); Other surgical history (04/22/2015); Other surgical history (04/22/2015); Total knee arthroplasty (04/22/2015); Other surgical history (04/22/2015); Other surgical history (04/22/2015); CT angio head w and wo IV contrast (06/28/2018); CT angio neck (06/28/2018); MR angio head wo IV contrast (09/29/2021); CT angio head w and wo IV contrast (08/29/2022); CT angio neck (08/29/2022); Breast surgery (2007); Fracture surgery (1980); Joint replacement (2011, 2021,2022); Spine surgery (2019); Small intestine surgery (11/2016); Breast biopsy; Total shoulder arthroplasty (Bilateral);  Gastric restriction surgery (2003); Sleeve Gastroplasty (2002); Parathyroid gland surgery (2014); Gastric bypass (2002); Colon surgery (2002); Back surgery (2019); and Bariatric Surgery (2003).    Social history reviewed:   reports that she has never smoked. She has never used smokeless tobacco. She reports that she does not currently use alcohol after a past usage of about 2.0 standard drinks of alcohol per week. She reports that she does not use drugs.     Family history reviewed:    Family History   Problem Relation Name Age of Onset    Heart failure Mother Elisabet     Hypertension Mother Elisabet     Arthritis Mother Elisabet     Depression Mother Elisabet     Early natural death Mother Elisabet     Stroke Mother Elisabet     Heart failure Father Vernon     Heart disease Father Vernon     Other (Sinus problem) Brother      Alcohol abuse Brother Mario     Diabetes Brother Mario     Hypertension Brother Mario     Coronary artery disease Brother Mario     Heart failure Brother Mario     Alcohol abuse Brother Junior     Drug abuse Brother Junior     Stroke Mother's Sister Katharine     Cancer Maternal Grandfather Loveaskjorge     Early natural death Maternal Grandfather Jose     Stroke Maternal Grandfather Jose     Lung cancer Paternal Grandfather Mario Crump     Cancer Other Grandfather     Macular degeneration Neg Hx     Multiple family members with premature CVA.  She also notes that multiple family members have had hemorrhagic strokes on antiplatelets or anticoagulants.    Allergies reviewed: Patient has no known allergies.     Medications reviewed:   Current Outpatient Medications   Medication Instructions    amLODIPine (NORVASC) 10 mg, oral, Daily    atorvastatin (LIPITOR) 10 mg, oral, Daily    blood-glucose sensor (FreeStyle Sophy 3 Plus Sensor) device Change sensor every 15 days    calcium carbonate (Tums) 200 mg calcium chewable tablet 1 tablet, 4 times daily PRN    diclofenac sodium (VOLTAREN ARTHRITIS PAIN) 4 g, Topical, 4 times  daily PRN    esomeprazole (NEXIUM) 40 mg, oral, Daily before breakfast, Do not open capsule.    levothyroxine (SYNTHROID, LEVOXYL) 75 mcg, oral, Daily    ondansetron (ZOFRAN) 8 mg, oral, 2 times daily PRN        Vitals reviewed: There were no vitals taken for this visit.      Physical Exam:   General:  Patient is awake, alert, and oriented.  Patient is in no acute distress.  HEENT:  Pupils equal and reactive.  Normocephalic.  Moist mucosa.    Neck:  No thyromegaly.  Normal Jugular Venous Pressure.  Cardiovascular:  Regular rate and rhythm.  Normal S1 and S2.  1/6 DORIS.  Pulmonary:  Clear to auscultation bilaterally.  Abdomen:  Soft. Non-tender.   Non-distended.  Positive bowel sounds.  Lower Extremities:  2+ pedal pulses.  Trace to 1+ LE edema.  Neurologic:  Cranial nerves intact.  No focal deficit.   Skin: Skin warm and dry, normal skin turgor.   Psychiatric: Normal affect.    Last Labs:  CBC -      Lab Results   Component Value Date    WBC 6.0 03/03/2025    HGB 13.7 03/03/2025    HCT 42.2 03/03/2025     03/03/2025        CMP-  Lab Results   Component Value Date    GLUCOSE 89 03/03/2025     03/03/2025    K 4.7 03/03/2025     03/03/2025    CO2 27 03/03/2025    ANIONGAP 15 03/03/2025    BUN 18 03/03/2025    CREATININE 1.29 (H) 03/03/2025    EGFR 44 (L) 03/03/2025    EGFR 45 (L) 03/03/2025    CALCIUM 9.3 03/03/2025    PHOS 4.2 09/03/2024    PROT 6.5 03/03/2025    ALBUMIN 4.3 03/03/2025    AST 15 03/03/2025    ALT 11 03/03/2025    ALKPHOS 97 03/03/2025    BILITOT 0.5 03/03/2025        LIPIDS-  Lab Results   Component Value Date    CHOL 187 11/20/2024    TRIG 79 11/20/2024    HDL 86.8 11/20/2024    CHHDL 2.2 11/20/2024    VLDL 16 11/20/2024        OTHERS-  Lab Results   Component Value Date    HGBA1C 5.6 11/20/2024    BNP 61 09/03/2024        I personally reviewed the patient's recent vitals, labs, medications, orders, EKGs, pertinent cardiac imaging/ echocardiography and ischemic evaluations including  "stress testing/ cardiac catheterization.    Assessment and Plan:    Marissa Mosquera \"Harman or Ms Mosquera\" is a 70 y.o. 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  She is doing relatively well from a cardiovascular standpoint.  Lower extremity edema has largely resolved.  No significant chest discomfort.   Dyspnea exertion is stable.  We reviewed and discussed her recent echocardiogram.  Of note her BNP level has come down to the normal range.  She was recently diagnosed with a neuroendocrine tumor and continues to have significant gastroenterology related issues.      As we discussed, I placed a referral for a dietitian.    Please continue current cardiac medication including amlodipine 10 mg daily, atorvastatin 10 mg daily.    We discussed getting a home blood pressure cuff and checking a home blood pressure log.  Please make sure the blood pressure cuff goes on your upper arm and is the appropriate size for your upper arm.   I usually recommend checking your blood pressure for one month before coming to see me or " your primary care physician about 4-5 times a week for one month.  Please take some of these readings in the morning and some in the afternoon or evening.  Please create a log of date, time, and blood pressure on a piece of paper or notebook that we can review at your next visit.     Please followup with me in Cardiology clinic within the next 1 year.  Please return to clinic sooner or seek emergent care if your symptoms reoccur or worsen.    Thank you for allowing me to participate in their care.  Please feel free to call me with any further questions or concerns.        Jose Randhawa MD, FAC, ALEC ALCOCER  Division of Cardiovascular Medicine  System Director, Nuclear Cardiology   Medical Director, [facility] [place], [facility]   Clinical , [employer]  [email or web address]   Office:  [phone]

## 2025-04-21 ENCOUNTER — HOSPITAL ENCOUNTER (OUTPATIENT)
Dept: RADIOLOGY | Facility: CLINIC | Age: 70
Discharge: HOME | End: 2025-04-21
Payer: MEDICARE

## 2025-04-21 VITALS — HEIGHT: 60 IN | BODY MASS INDEX: 38.48 KG/M2 | WEIGHT: 195.99 LBS

## 2025-04-21 DIAGNOSIS — Z12.31 ENCOUNTER FOR SCREENING MAMMOGRAM FOR BREAST CANCER: ICD-10-CM

## 2025-04-21 PROCEDURE — 77067 SCR MAMMO BI INCL CAD: CPT | Performed by: RADIOLOGY

## 2025-04-21 PROCEDURE — 77063 BREAST TOMOSYNTHESIS BI: CPT | Performed by: RADIOLOGY

## 2025-04-21 PROCEDURE — 77063 BREAST TOMOSYNTHESIS BI: CPT

## 2025-04-22 DIAGNOSIS — K31.89 DUODENAL NODULE: ICD-10-CM

## 2025-04-22 RX ORDER — ESOMEPRAZOLE MAGNESIUM 40 MG/1
40 CAPSULE, DELAYED RELEASE ORAL
Qty: 30 CAPSULE | Refills: 2 | Status: SHIPPED | OUTPATIENT
Start: 2025-04-22 | End: 2025-07-21

## 2025-04-28 ENCOUNTER — HOSPITAL ENCOUNTER (OUTPATIENT)
Dept: RADIOLOGY | Facility: HOSPITAL | Age: 70
Discharge: HOME | End: 2025-04-28
Payer: MEDICARE

## 2025-04-28 ENCOUNTER — TELEPHONE (OUTPATIENT)
Dept: ENDOCRINOLOGY | Facility: HOSPITAL | Age: 70
End: 2025-04-28
Payer: MEDICARE

## 2025-04-28 DIAGNOSIS — R73.09 OTHER ABNORMAL GLUCOSE: ICD-10-CM

## 2025-04-28 DIAGNOSIS — D3A.8 OTHER BENIGN NEUROENDOCRINE TUMORS: ICD-10-CM

## 2025-04-28 DIAGNOSIS — E16.2 HYPOGLYCEMIA, UNSPECIFIED: ICD-10-CM

## 2025-04-28 PROCEDURE — 78264 GASTRIC EMPTYING IMG STUDY: CPT | Performed by: RADIOLOGY

## 2025-04-28 PROCEDURE — A9541 TC99M SULFUR COLLOID: HCPCS | Performed by: FAMILY MEDICINE

## 2025-04-28 PROCEDURE — 78264 GASTRIC EMPTYING IMG STUDY: CPT

## 2025-04-28 PROCEDURE — 3430000001 HC RX 343 DIAGNOSTIC RADIOPHARMACEUTICALS: Performed by: FAMILY MEDICINE

## 2025-04-28 RX ORDER — TECHNETIUM TC 99M SULFUR COLLOID 2 MG
1 KIT MISCELLANEOUS
Status: COMPLETED | OUTPATIENT
Start: 2025-04-28 | End: 2025-04-28

## 2025-04-28 RX ADMIN — TECHNETIUM TC 99M SULFUR COLLOID KIT 1 MILLICURIE: KIT at 09:15

## 2025-04-28 NOTE — TELEPHONE ENCOUNTER
"Called patient regarding the stat referral placed by \"[facility]\". Patient reports hyperglycemic episodes after food intake followed by hypoglycemic episodes.  She reports that these episodes come and go.  She underwent gastric emptying study today and it is suggestive of mildly delayed gastric emptying for solids.  Patient is extremely frustrated about variable blood glucose levels.  She has lexie 3 CGM.  Patient was connected to [facility] for data sharing during phone call visit.    Patient was advised to take frequent small meals.  She was also encouraged to keep using CGM.  Patient has an appointment scheduled in endocrinology clinic on 5/9/25.  Patient was advised to keep the appointment.  Patient is aware of symptoms of hypoglycemia and is well versed in management of the symptoms.  She reports that she usually drinks soda pop or eat M&M to treat hypoglycemia.      "

## 2025-05-01 ENCOUNTER — APPOINTMENT (OUTPATIENT)
Dept: RADIOLOGY | Facility: HOSPITAL | Age: 70
End: 2025-05-01
Payer: MEDICARE

## 2025-05-06 ENCOUNTER — APPOINTMENT (OUTPATIENT)
Dept: PRIMARY CARE | Facility: CLINIC | Age: 70
End: 2025-05-06
Payer: MEDICARE

## 2025-05-09 ENCOUNTER — APPOINTMENT (OUTPATIENT)
Dept: ENDOCRINOLOGY | Facility: CLINIC | Age: 70
End: 2025-05-09
Payer: MEDICARE

## 2025-05-09 VITALS
HEIGHT: 60 IN | HEART RATE: 65 BPM | WEIGHT: 196 LBS | SYSTOLIC BLOOD PRESSURE: 128 MMHG | BODY MASS INDEX: 38.48 KG/M2 | DIASTOLIC BLOOD PRESSURE: 78 MMHG

## 2025-05-09 DIAGNOSIS — K91.2 HYPOGLYCEMIA AFTER GI (GASTROINTESTINAL) SURGERY: ICD-10-CM

## 2025-05-09 DIAGNOSIS — E03.9 HYPOTHYROIDISM, UNSPECIFIED TYPE: Primary | ICD-10-CM

## 2025-05-09 PROCEDURE — 3078F DIAST BP <80 MM HG: CPT | Performed by: INTERNAL MEDICINE

## 2025-05-09 PROCEDURE — 1036F TOBACCO NON-USER: CPT | Performed by: INTERNAL MEDICINE

## 2025-05-09 PROCEDURE — 3074F SYST BP LT 130 MM HG: CPT | Performed by: INTERNAL MEDICINE

## 2025-05-09 PROCEDURE — 3008F BODY MASS INDEX DOCD: CPT | Performed by: INTERNAL MEDICINE

## 2025-05-09 PROCEDURE — 1159F MED LIST DOCD IN RCRD: CPT | Performed by: INTERNAL MEDICINE

## 2025-05-09 PROCEDURE — 99214 OFFICE O/P EST MOD 30 MIN: CPT | Performed by: INTERNAL MEDICINE

## 2025-05-09 RX ORDER — OMEPRAZOLE 40 MG/1
40 CAPSULE, DELAYED RELEASE ORAL
COMMUNITY

## 2025-05-09 NOTE — PROGRESS NOTES
Chief complaint    Evaluation of fluctuating blood glucose level    HPI    70-year-old woman whom we saw a few weeks ago for evaluation of fluctuating blood sugars. Her past medical history is a bit complicated but it started out with gastroplasty in 2000 that resulted in gastric dysmotility/decreased motility; later on she was she underwent surgery in November 2017 for lysis of adhesions and had recent endoscopy in October 2024 at which time she was found to have a neuroendocrine tumor that was completely removed through the endoscopy Endoscopy was repeated since then in 2/2025 and was completely negative; however gastric emptying study done recently showed gastroparesis.    She is here today because of fluctuating blood glucose levels that range from hyperglycemia that is followed up by profound decrease in glucose with symptomatic hypoglycemia she was evaluated by 2 other  endocrinologists and had not accepted med and had not been on any treatment since then as she is managing treatment by taking smaller meals and avoiding carbohydrates.    In reviewing her glucose levels it seems like there is significant amount of fluctuations with hyperglycemia up to 250 noted primarily after a meal and low blood glucose levels around 50-60 noted postprandially with then as early as 40 minutes and as late as 2 hours.  Recent laboratory studies showed that her gastrin and chromogranin levels are slightly higher which is not unusual for somebody who is on PPI.    We discussed at length potential ways to deal with this issue specifically to minimize the fluctuation in her blood glucose level notably her hemoglobin A1c has been only 6 and suggested that she should continue to take small meals and record requested that we sent her to a dietitian to help her with meal preparation.  We offered her 2 options to consider:  one of them is to use acarbose to decrease glucose absorption with meals however she was not interested in  pursuing that at this point.  The other option we raised was to use Reglan to improve gastrointestinal motility and she would consider that and discuss that with her primary physician.    With that in mind we did not feel that she needs to return for a follow-up visit but can continue to follow with the primary physician as well as the dietitian      Jm Gant MD

## 2025-05-20 DIAGNOSIS — E16.2 HYPOGLYCEMIA: ICD-10-CM

## 2025-05-21 RX ORDER — BLOOD-GLUCOSE SENSOR
EACH MISCELLANEOUS
Qty: 1 EACH | Refills: 11 | Status: SHIPPED | OUTPATIENT
Start: 2025-05-21

## 2025-07-10 ENCOUNTER — APPOINTMENT (OUTPATIENT)
Dept: PRIMARY CARE | Facility: CLINIC | Age: 70
End: 2025-07-10
Payer: MEDICARE

## 2025-07-10 VITALS
HEART RATE: 64 BPM | DIASTOLIC BLOOD PRESSURE: 78 MMHG | SYSTOLIC BLOOD PRESSURE: 144 MMHG | RESPIRATION RATE: 16 BRPM | HEIGHT: 60 IN | WEIGHT: 196.8 LBS | OXYGEN SATURATION: 95 % | BODY MASS INDEX: 38.64 KG/M2 | TEMPERATURE: 98 F

## 2025-07-10 DIAGNOSIS — R11.0 NAUSEA: ICD-10-CM

## 2025-07-10 DIAGNOSIS — K31.89 DUODENAL NODULE: ICD-10-CM

## 2025-07-10 DIAGNOSIS — E16.2 HYPOGLYCEMIA: Primary | ICD-10-CM

## 2025-07-10 DIAGNOSIS — I10 PRIMARY HYPERTENSION: ICD-10-CM

## 2025-07-10 DIAGNOSIS — T75.3XXS MOTION SICKNESS, SEQUELA: ICD-10-CM

## 2025-07-10 DIAGNOSIS — K31.84 GASTROPARESIS: ICD-10-CM

## 2025-07-10 PROCEDURE — 3008F BODY MASS INDEX DOCD: CPT | Performed by: FAMILY MEDICINE

## 2025-07-10 PROCEDURE — 99214 OFFICE O/P EST MOD 30 MIN: CPT | Performed by: FAMILY MEDICINE

## 2025-07-10 PROCEDURE — 3077F SYST BP >= 140 MM HG: CPT | Performed by: FAMILY MEDICINE

## 2025-07-10 PROCEDURE — 1036F TOBACCO NON-USER: CPT | Performed by: FAMILY MEDICINE

## 2025-07-10 PROCEDURE — G2211 COMPLEX E/M VISIT ADD ON: HCPCS | Performed by: FAMILY MEDICINE

## 2025-07-10 PROCEDURE — 1159F MED LIST DOCD IN RCRD: CPT | Performed by: FAMILY MEDICINE

## 2025-07-10 PROCEDURE — 3078F DIAST BP <80 MM HG: CPT | Performed by: FAMILY MEDICINE

## 2025-07-10 RX ORDER — ONDANSETRON 4 MG/1
8 TABLET, FILM COATED ORAL 2 TIMES DAILY PRN
Qty: 20 TABLET | Refills: 11 | Status: SHIPPED | OUTPATIENT
Start: 2025-07-10

## 2025-07-10 RX ORDER — ESOMEPRAZOLE MAGNESIUM 40 MG/1
40 CAPSULE, DELAYED RELEASE ORAL
Qty: 90 CAPSULE | Refills: 3 | Status: SHIPPED | OUTPATIENT
Start: 2025-07-10 | End: 2026-07-10

## 2025-07-10 RX ORDER — SCOPOLAMINE 1 MG/3D
1 PATCH, EXTENDED RELEASE TRANSDERMAL
Qty: 10 PATCH | Refills: 0 | Status: SHIPPED | OUTPATIENT
Start: 2025-07-10 | End: 2025-09-08

## 2025-07-10 ASSESSMENT — PATIENT HEALTH QUESTIONNAIRE - PHQ9
2. FEELING DOWN, DEPRESSED OR HOPELESS: NOT AT ALL
1. LITTLE INTEREST OR PLEASURE IN DOING THINGS: NOT AT ALL
SUM OF ALL RESPONSES TO PHQ9 QUESTIONS 1 AND 2: 0

## 2025-07-10 NOTE — PROGRESS NOTES
"Subjective   Marissa Mosquera \"Harman or Ms Mosquera\" is a 70 y.o. female who presents for Follow-up.  HPI    Saw star, Dr Gant, but he was retiring and no further follow up. Mentioned using acarbose vs reglan.   Seeing star at [place] this mo for ?insulinoma.   Still having hypogly episodes.  Collapsed bc   Will regurgitate undigested food days later  Episodes of hot flashes.     LBP worse. Taking tylenol/heat/cold.   Medications Ordered Prior to Encounter[1]               Objective   /78   Pulse 64   Temp 36.7 °C (98 °F)   Resp 16   Ht (!) 1.524 m (5')   Wt 89.3 kg (196 lb 12.8 oz)   SpO2 95%   BMI 38.43 kg/m²    Physical Exam  General: NAD  HEENT:NCAT, PERRLA, nml OP  Neck: no cervical KALPANA  Heart: RRR no murmur, no edema   Lungs: CTA b/l, no wheeze or rhonchi   GI: abd soft, nontender, nondistended.   MSK: no c/c/e. nml gait   Skin: warm and dry  Psych: cooperative, appropriate affect  Neuro: speech clear. A&Ox3  Assessment/Plan   Problem List Items Addressed This Visit       Gastroparesis  Recent GES in April  Upcoming appt at [facility]   Declines local GI RF for now       HTN (hypertension)  Borderline today  Will cont amlodipine 10       Hypoglycemia - Primary  Complex case, upcoming appt [facility]  [place] f/up after        Other Visit Diagnoses         Nausea      RF zofran       Relevant Medications    ondansetron (Zofran) 4 mg tablet      Duodenal nodule        Relevant Medications    esomeprazole (NexIUM) 40 mg DR capsule      Motion sickness, sequela      Scopolamine patches for upcoming cruise       Relevant Medications    scopolamine (Transderm-Scop) 1 mg over 3 days patch 3 day    GERD: stable on nexium, refilled                    [1]   Current Outpatient Medications on File Prior to Visit   Medication Sig Dispense Refill    amLODIPine (Norvasc) 10 mg tablet Take 1 tablet (10 mg) by mouth once daily. 90 tablet 3    atorvastatin (Lipitor) 10 mg tablet TAKE 1 TABLET BY MOUTH EVERY DAY 90 " tablet 3    FreeStyle Sophy 3 Plus Sensor device Change sensor every 15 days 1 each 11    levothyroxine (Synthroid, Levoxyl) 75 mcg tablet TAKE 1 TABLET BY MOUTH EVERY DAY 90 tablet 3    [DISCONTINUED] esomeprazole (NexIUM) 40 mg DR capsule Take 1 capsule (40 mg) by mouth once daily in the morning. Take before meals. Do not open capsule. 30 capsule 2    [DISCONTINUED] ondansetron (Zofran) 4 mg tablet Take 2 tablets (8 mg) by mouth 2 times a day as needed for nausea or vomiting. 20 tablet 3    calcium carbonate (Tums) 200 mg calcium chewable tablet Chew 1 tablet (500 mg) 4 times a day as needed for indigestion or heartburn. (Patient not taking: Reported on 7/10/2025)      [DISCONTINUED] omeprazole (PriLOSEC) 40 mg DR capsule Take 1 capsule (40 mg) by mouth. Do not crush or chew.       Current Facility-Administered Medications on File Prior to Visit   Medication Dose Route Frequency Provider Last Rate Last Admin    perflutren lipid microspheres (Definity) injection 0.5-10 mL of dilution  0.5-10 mL of dilution intravenous Once in imaging Jose BRIGGS MD

## 2025-07-11 ENCOUNTER — PATIENT MESSAGE (OUTPATIENT)
Dept: PRIMARY CARE | Facility: CLINIC | Age: 70
End: 2025-07-11
Payer: MEDICARE

## 2025-07-11 DIAGNOSIS — I10 BENIGN ESSENTIAL HYPERTENSION: ICD-10-CM

## 2025-07-14 RX ORDER — AMLODIPINE BESYLATE 10 MG/1
10 TABLET ORAL DAILY
Qty: 90 TABLET | Refills: 3 | Status: SHIPPED | OUTPATIENT
Start: 2025-07-14

## 2025-08-01 ENCOUNTER — APPOINTMENT (OUTPATIENT)
Dept: PRIMARY CARE | Facility: CLINIC | Age: 70
End: 2025-08-01
Payer: MEDICARE

## 2025-08-01 VITALS
OXYGEN SATURATION: 97 % | HEIGHT: 60 IN | HEART RATE: 66 BPM | BODY MASS INDEX: 38.09 KG/M2 | SYSTOLIC BLOOD PRESSURE: 138 MMHG | WEIGHT: 194 LBS | RESPIRATION RATE: 15 BRPM | TEMPERATURE: 98 F | DIASTOLIC BLOOD PRESSURE: 70 MMHG

## 2025-08-01 DIAGNOSIS — K31.84 GASTROPARESIS: ICD-10-CM

## 2025-08-01 DIAGNOSIS — E53.8 B12 DEFICIENCY: ICD-10-CM

## 2025-08-01 DIAGNOSIS — M81.0 OSTEOPOROSIS, UNSPECIFIED OSTEOPOROSIS TYPE, UNSPECIFIED PATHOLOGICAL FRACTURE PRESENCE: ICD-10-CM

## 2025-08-01 DIAGNOSIS — N18.32 STAGE 3B CHRONIC KIDNEY DISEASE (MULTI): Primary | ICD-10-CM

## 2025-08-01 DIAGNOSIS — Z78.0 POST-MENOPAUSAL: ICD-10-CM

## 2025-08-01 DIAGNOSIS — E61.1 IRON DEFICIENCY: ICD-10-CM

## 2025-08-01 DIAGNOSIS — E55.9 VITAMIN D INSUFFICIENCY: ICD-10-CM

## 2025-08-01 PROCEDURE — 1036F TOBACCO NON-USER: CPT | Performed by: FAMILY MEDICINE

## 2025-08-01 PROCEDURE — 3078F DIAST BP <80 MM HG: CPT | Performed by: FAMILY MEDICINE

## 2025-08-01 PROCEDURE — G2211 COMPLEX E/M VISIT ADD ON: HCPCS | Performed by: FAMILY MEDICINE

## 2025-08-01 PROCEDURE — 99214 OFFICE O/P EST MOD 30 MIN: CPT | Performed by: FAMILY MEDICINE

## 2025-08-01 PROCEDURE — 3008F BODY MASS INDEX DOCD: CPT | Performed by: FAMILY MEDICINE

## 2025-08-01 PROCEDURE — 1159F MED LIST DOCD IN RCRD: CPT | Performed by: FAMILY MEDICINE

## 2025-08-01 PROCEDURE — 3075F SYST BP GE 130 - 139MM HG: CPT | Performed by: FAMILY MEDICINE

## 2025-08-01 RX ORDER — METOCLOPRAMIDE 5 MG/1
5 TABLET ORAL 3 TIMES DAILY
Qty: 90 TABLET | Refills: 5 | Status: SHIPPED | OUTPATIENT
Start: 2025-08-01 | End: 2026-01-28

## 2025-08-01 NOTE — PROGRESS NOTES
"Subjective   Marissa Mosquera \"Harman or Ms Mosquera\" is a 70 y.o. female who presents for Treatment Planning and left arm  (Skin rxn to draw lock blood draw).  HPI      Went to [facility] for hypogly. Did multiple fasting glu levels but did not drop beow 50   Was rec to see integrative med.   Met w dietician     Interested in trying reglan for her GI dysmotility issues     Stopped CGM bc no longer beneficial   Medications Ordered Prior to Encounter[1]               Objective   /70   Pulse 66   Temp 36.7 °C (98 °F)   Resp 15   Ht (!) 1.524 m (5')   Wt 88 kg (194 lb)   SpO2 97%   BMI 37.89 kg/m²    Physical Exam  General: NAD  HEENT:NCAT, PERRLA  Heart: RRR no murmur, no edema   Lungs: CTA b/l, no wheeze or rhonchi   GI: abd soft, nontender, nondistended.   MSK: no c/c/e. nml gait   Skin: warm and dry  Psych: cooperative, appropriate affect  Neuro: speech clear. A&Ox3  Assessment/Plan   Problem List Items Addressed This Visit       Stage 3b chronic kidney disease (Multi) - Primary  Slight dec in GFR on recent labs  Will rpt BMP       Relevant Orders    Basic Metabolic Panel    Gastroparesis  Mild on GES but has significant GI dysmotility that is disruptive to her quality of life.   Trial of reglan 5 mg BID-TID   F/up 1 mo      Relevant Medications    metoclopramide (Reglan) 5 mg tablet    Osteoporosis  Previously on prolia  Update DEXA   RF to rheum as would likely benefit from restarting prolia       Relevant Orders    Referral to Rheumatology    Vitamin D insufficiency  Check vit D      Other Visit Diagnoses         B12 deficiency      Check B12   Hx GI surg         Post-menopausal        Relevant Orders    XR DEXA bone density      Iron deficiency      Check Fe levels     Relevant Orders    Ferritin    Iron and TIBC                 [1]   Current Outpatient Medications on File Prior to Visit   Medication Sig Dispense Refill    amLODIPine (Norvasc) 10 mg tablet Take 1 tablet (10 mg) by mouth once daily. 90 " tablet 3    atorvastatin (Lipitor) 10 mg tablet TAKE 1 TABLET BY MOUTH EVERY DAY 90 tablet 3    calcium carbonate (Tums) 200 mg calcium chewable tablet Chew and swallow 1 tablet 4 times a day as needed for indigestion or heartburn.      esomeprazole (NexIUM) 40 mg DR capsule Take 1 capsule (40 mg) by mouth once daily in the morning. Take before meals. Do not open capsule. 90 capsule 3    levothyroxine (Synthroid, Levoxyl) 75 mcg tablet TAKE 1 TABLET BY MOUTH EVERY DAY 90 tablet 3    ondansetron (Zofran) 4 mg tablet Take 2 tablets (8 mg) by mouth 2 times a day as needed for nausea or vomiting. 20 tablet 11    scopolamine (Transderm-Scop) 1 mg over 3 days patch 3 day Place 1 patch over 72 hours on the skin every 3rd day if needed (nausea). 10 patch 0    FreeStyle Sophy 3 Plus Sensor device Change sensor every 15 days 1 each 11     Current Facility-Administered Medications on File Prior to Visit   Medication Dose Route Frequency Provider Last Rate Last Admin    perflutren lipid microspheres (Definity) injection 0.5-10 mL of dilution  0.5-10 mL of dilution intravenous Once in imaging Jose BRIGGS MD

## 2025-08-12 ENCOUNTER — APPOINTMENT (OUTPATIENT)
Dept: ENDOCRINOLOGY | Facility: CLINIC | Age: 70
End: 2025-08-12
Payer: MEDICARE

## 2025-08-12 ENCOUNTER — TELEPHONE (OUTPATIENT)
Dept: PRIMARY CARE | Facility: CLINIC | Age: 70
End: 2025-08-12

## 2025-08-12 DIAGNOSIS — K31.84 GASTROPARESIS: Primary | ICD-10-CM

## 2025-08-13 ENCOUNTER — APPOINTMENT (OUTPATIENT)
Dept: RADIOLOGY | Facility: CLINIC | Age: 70
End: 2025-08-13
Payer: MEDICARE

## 2025-08-13 ENCOUNTER — HOSPITAL ENCOUNTER (OUTPATIENT)
Dept: RADIOLOGY | Facility: HOSPITAL | Age: 70
Discharge: HOME | End: 2025-08-13
Payer: MEDICARE

## 2025-08-13 ENCOUNTER — PATIENT MESSAGE (OUTPATIENT)
Dept: PRIMARY CARE | Facility: CLINIC | Age: 70
End: 2025-08-13

## 2025-08-13 ENCOUNTER — APPOINTMENT (OUTPATIENT)
Dept: PRIMARY CARE | Facility: CLINIC | Age: 70
End: 2025-08-13
Payer: MEDICARE

## 2025-08-13 DIAGNOSIS — Z78.0 POST-MENOPAUSAL: ICD-10-CM

## 2025-08-13 LAB
25(OH)D3+25(OH)D2 SERPL-MCNC: 24 NG/ML (ref 30–100)
VIT B12 SERPL-MCNC: 225 PG/ML (ref 200–1100)

## 2025-08-13 PROCEDURE — 77080 DXA BONE DENSITY AXIAL: CPT

## 2025-08-13 PROCEDURE — 77080 DXA BONE DENSITY AXIAL: CPT | Performed by: STUDENT IN AN ORGANIZED HEALTH CARE EDUCATION/TRAINING PROGRAM

## 2025-08-14 ENCOUNTER — TELEPHONE (OUTPATIENT)
Dept: PRIMARY CARE | Facility: CLINIC | Age: 70
End: 2025-08-14
Payer: MEDICARE

## 2025-08-14 DIAGNOSIS — K58.0 IRRITABLE BOWEL SYNDROME WITH DIARRHEA: Primary | ICD-10-CM

## 2025-08-14 LAB
ANION GAP SERPL CALCULATED.4IONS-SCNC: 14 MMOL/L (CALC) (ref 7–17)
BUN SERPL-MCNC: 23 MG/DL (ref 7–25)
BUN/CREAT SERPL: 17 (CALC) (ref 6–22)
CALCIUM SERPL-MCNC: 8.8 MG/DL (ref 8.6–10.4)
CHLORIDE SERPL-SCNC: 106 MMOL/L (ref 98–110)
CO2 SERPL-SCNC: 20 MMOL/L (ref 20–32)
CREAT SERPL-MCNC: 1.38 MG/DL (ref 0.6–1)
EGFRCR SERPLBLD CKD-EPI 2021: 41 ML/MIN/1.73M2
FERRITIN SERPL-MCNC: 10 NG/ML (ref 16–288)
GLUCOSE SERPL-MCNC: 85 MG/DL (ref 65–139)
IRON SATN MFR SERPL: 17 % (CALC) (ref 16–45)
IRON SERPL-MCNC: 71 MCG/DL (ref 45–160)
POTASSIUM SERPL-SCNC: 4.4 MMOL/L (ref 3.5–5.3)
SODIUM SERPL-SCNC: 140 MMOL/L (ref 135–146)
TIBC SERPL-MCNC: 407 MCG/DL (CALC) (ref 250–450)

## 2025-09-08 ENCOUNTER — APPOINTMENT (OUTPATIENT)
Dept: PRIMARY CARE | Facility: CLINIC | Age: 70
End: 2025-09-08
Payer: MEDICARE

## 2025-09-22 ENCOUNTER — APPOINTMENT (OUTPATIENT)
Dept: GASTROENTEROLOGY | Facility: CLINIC | Age: 70
End: 2025-09-22
Payer: MEDICARE

## 2025-10-01 ENCOUNTER — APPOINTMENT (OUTPATIENT)
Dept: GASTROENTEROLOGY | Facility: CLINIC | Age: 70
End: 2025-10-01
Payer: MEDICARE

## 2025-10-21 ENCOUNTER — APPOINTMENT (OUTPATIENT)
Dept: RHEUMATOLOGY | Facility: CLINIC | Age: 70
End: 2025-10-21
Payer: MEDICARE

## 2025-11-04 ENCOUNTER — APPOINTMENT (OUTPATIENT)
Dept: OPHTHALMOLOGY | Facility: CLINIC | Age: 70
End: 2025-11-04
Payer: MEDICARE

## 2025-11-11 ENCOUNTER — APPOINTMENT (OUTPATIENT)
Dept: OPHTHALMOLOGY | Facility: CLINIC | Age: 70
End: 2025-11-11
Payer: MEDICARE

## 2025-11-13 ENCOUNTER — APPOINTMENT (OUTPATIENT)
Dept: RADIOLOGY | Facility: HOSPITAL | Age: 70
End: 2025-11-13
Payer: MEDICARE

## 2026-04-13 ENCOUNTER — APPOINTMENT (OUTPATIENT)
Dept: CARDIOLOGY | Facility: CLINIC | Age: 71
End: 2026-04-13
Payer: MEDICARE